# Patient Record
Sex: MALE | Race: BLACK OR AFRICAN AMERICAN | NOT HISPANIC OR LATINO | Employment: OTHER | URBAN - METROPOLITAN AREA
[De-identification: names, ages, dates, MRNs, and addresses within clinical notes are randomized per-mention and may not be internally consistent; named-entity substitution may affect disease eponyms.]

---

## 2023-02-11 PROBLEM — N40.1 BPH WITH OBSTRUCTION/LOWER URINARY TRACT SYMPTOMS: Status: ACTIVE | Noted: 2023-02-11

## 2023-02-11 PROBLEM — I10 BENIGN ESSENTIAL HYPERTENSION: Status: ACTIVE | Noted: 2023-02-11

## 2023-02-11 PROBLEM — R31.9 HEMATURIA: Status: ACTIVE | Noted: 2023-02-11

## 2023-02-11 PROBLEM — N40.0 ENLARGED PROSTATE WITHOUT LOWER URINARY TRACT SYMPTOMS (LUTS): Status: ACTIVE | Noted: 2023-02-11

## 2023-02-11 PROBLEM — M79.89 CALF SWELLING: Status: ACTIVE | Noted: 2023-02-11

## 2023-02-11 PROBLEM — N13.8 BPH WITH OBSTRUCTION/LOWER URINARY TRACT SYMPTOMS: Status: ACTIVE | Noted: 2023-02-11

## 2023-02-11 PROBLEM — I87.2 VENOUS INSUFFICIENCY: Status: ACTIVE | Noted: 2023-02-11

## 2023-02-11 PROBLEM — M54.9 BACK PAIN: Status: ACTIVE | Noted: 2023-02-11

## 2023-02-11 PROBLEM — R60.0 PEDAL EDEMA: Status: ACTIVE | Noted: 2023-02-11

## 2023-02-11 PROBLEM — R35.0 FREQUENT URINATION: Status: ACTIVE | Noted: 2023-02-11

## 2023-02-11 PROBLEM — N47.1 PHIMOSIS: Status: ACTIVE | Noted: 2023-02-11

## 2023-02-11 PROBLEM — M25.511 RIGHT SHOULDER PAIN: Status: ACTIVE | Noted: 2023-02-11

## 2023-02-11 PROBLEM — M12.819 ROTATOR CUFF ARTHROPATHY: Status: ACTIVE | Noted: 2023-02-11

## 2023-02-11 PROBLEM — M88.9 PAGET'S BONE DISEASE: Status: ACTIVE | Noted: 2023-02-11

## 2023-02-11 PROBLEM — M43.10 ACQUIRED SPONDYLOLISTHESIS: Status: ACTIVE | Noted: 2023-02-11

## 2023-02-11 PROBLEM — R26.81 GAIT INSTABILITY: Status: ACTIVE | Noted: 2023-02-11

## 2023-02-11 PROBLEM — R33.9 URINARY RETENTION: Status: ACTIVE | Noted: 2023-02-11

## 2023-02-11 PROBLEM — E78.5 HYPERLIPEMIA: Status: ACTIVE | Noted: 2023-02-11

## 2023-02-11 PROBLEM — G20.A1 PARKINSON'S DISEASE (MULTI): Status: ACTIVE | Noted: 2023-02-11

## 2023-02-11 PROBLEM — N39.0 URINARY TRACT INFECTION: Status: ACTIVE | Noted: 2023-02-11

## 2023-02-11 RX ORDER — BRIMONIDINE TARTRATE AND TIMOLOL MALEATE 2; 5 MG/ML; MG/ML
1 SOLUTION OPHTHALMIC 2 TIMES DAILY
COMMUNITY
Start: 2020-01-07 | End: 2023-09-01 | Stop reason: ALTCHOICE

## 2023-02-11 RX ORDER — BETAMETHASONE DIPROPIONATE 0.5 MG/G
1 CREAM TOPICAL 2 TIMES DAILY
COMMUNITY
Start: 2021-04-12 | End: 2023-09-01 | Stop reason: ALTCHOICE

## 2023-02-11 RX ORDER — SULFAMETHOXAZOLE AND TRIMETHOPRIM 800; 160 MG/1; MG/1
1 TABLET ORAL 2 TIMES DAILY
COMMUNITY
Start: 2022-05-05 | End: 2023-09-01 | Stop reason: ALTCHOICE

## 2023-02-11 RX ORDER — DORZOLAMIDE HYDROCHLORIDE AND TIMOLOL MALEATE 20; 5 MG/ML; MG/ML
SOLUTION/ DROPS OPHTHALMIC
COMMUNITY
Start: 2021-03-02

## 2023-02-11 RX ORDER — LATANOPROST 50 UG/ML
1 SOLUTION/ DROPS OPHTHALMIC NIGHTLY
COMMUNITY
Start: 2019-10-24

## 2023-02-11 RX ORDER — CLOBETASOL PROPIONATE 0.5 MG/G
1 CREAM TOPICAL 2 TIMES DAILY
COMMUNITY
Start: 2022-09-20 | End: 2023-09-01 | Stop reason: ALTCHOICE

## 2023-02-11 RX ORDER — SIMVASTATIN 40 MG/1
40 TABLET, FILM COATED ORAL NIGHTLY
COMMUNITY
Start: 2014-06-11 | End: 2023-06-27 | Stop reason: SDUPTHER

## 2023-02-11 RX ORDER — CARBIDOPA AND LEVODOPA 25; 100 MG/1; MG/1
TABLET ORAL
COMMUNITY
Start: 2022-09-20 | End: 2024-02-22 | Stop reason: SDUPTHER

## 2023-02-11 RX ORDER — LOSARTAN POTASSIUM 50 MG/1
50 TABLET ORAL DAILY
COMMUNITY
Start: 2022-02-18 | End: 2023-09-06 | Stop reason: SDUPTHER

## 2023-02-11 RX ORDER — TAMSULOSIN HYDROCHLORIDE 0.4 MG/1
1 CAPSULE ORAL NIGHTLY
COMMUNITY
Start: 2014-06-11 | End: 2023-09-01 | Stop reason: ALTCHOICE

## 2023-02-24 LAB
ALANINE AMINOTRANSFERASE (SGPT) (U/L) IN SER/PLAS: 8 U/L (ref 10–52)
ALBUMIN (G/DL) IN SER/PLAS: 4.2 G/DL (ref 3.4–5)
ALKALINE PHOSPHATASE (U/L) IN SER/PLAS: 61 U/L (ref 33–136)
ANION GAP IN SER/PLAS: 9 MMOL/L (ref 10–20)
ASPARTATE AMINOTRANSFERASE (SGOT) (U/L) IN SER/PLAS: 14 U/L (ref 9–39)
BILIRUBIN TOTAL (MG/DL) IN SER/PLAS: 1 MG/DL (ref 0–1.2)
CALCIUM (MG/DL) IN SER/PLAS: 9.4 MG/DL (ref 8.6–10.3)
CARBON DIOXIDE, TOTAL (MMOL/L) IN SER/PLAS: 32 MMOL/L (ref 21–32)
CHLORIDE (MMOL/L) IN SER/PLAS: 102 MMOL/L (ref 98–107)
CHOLESTEROL (MG/DL) IN SER/PLAS: 133 MG/DL (ref 0–199)
CHOLESTEROL IN HDL (MG/DL) IN SER/PLAS: 53.3 MG/DL
CHOLESTEROL/HDL RATIO: 2.5
CREATININE (MG/DL) IN SER/PLAS: 0.93 MG/DL (ref 0.5–1.3)
ERYTHROCYTE DISTRIBUTION WIDTH (RATIO) BY AUTOMATED COUNT: 12.9 % (ref 11.5–14.5)
ERYTHROCYTE MEAN CORPUSCULAR HEMOGLOBIN CONCENTRATION (G/DL) BY AUTOMATED: 33.3 G/DL (ref 32–36)
ERYTHROCYTE MEAN CORPUSCULAR VOLUME (FL) BY AUTOMATED COUNT: 94 FL (ref 80–100)
ERYTHROCYTES (10*6/UL) IN BLOOD BY AUTOMATED COUNT: 4.49 X10E12/L (ref 4.5–5.9)
GFR MALE: 83 ML/MIN/1.73M2
GLUCOSE (MG/DL) IN SER/PLAS: 89 MG/DL (ref 74–99)
HEMATOCRIT (%) IN BLOOD BY AUTOMATED COUNT: 42.1 % (ref 41–52)
HEMOGLOBIN (G/DL) IN BLOOD: 14 G/DL (ref 13.5–17.5)
LDL: 63 MG/DL (ref 0–99)
LEUKOCYTES (10*3/UL) IN BLOOD BY AUTOMATED COUNT: 6.2 X10E9/L (ref 4.4–11.3)
PLATELETS (10*3/UL) IN BLOOD AUTOMATED COUNT: 179 X10E9/L (ref 150–450)
POTASSIUM (MMOL/L) IN SER/PLAS: 3.8 MMOL/L (ref 3.5–5.3)
PROSTATE SPECIFIC AG (NG/ML) IN SER/PLAS: 0.69 NG/ML (ref 0–4)
PROTEIN TOTAL: 7.1 G/DL (ref 6.4–8.2)
SODIUM (MMOL/L) IN SER/PLAS: 139 MMOL/L (ref 136–145)
THYROTROPIN (MIU/L) IN SER/PLAS BY DETECTION LIMIT <= 0.05 MIU/L: 3.25 MIU/L (ref 0.44–3.98)
TRIGLYCERIDE (MG/DL) IN SER/PLAS: 83 MG/DL (ref 0–149)
UREA NITROGEN (MG/DL) IN SER/PLAS: 14 MG/DL (ref 6–23)
VLDL: 17 MG/DL (ref 0–40)

## 2023-03-06 ENCOUNTER — OFFICE VISIT (OUTPATIENT)
Dept: PRIMARY CARE | Facility: CLINIC | Age: 78
End: 2023-03-06
Payer: MEDICARE

## 2023-03-06 VITALS
WEIGHT: 172.4 LBS | RESPIRATION RATE: 16 BRPM | BODY MASS INDEX: 27 KG/M2 | TEMPERATURE: 98.1 F | HEART RATE: 72 BPM | SYSTOLIC BLOOD PRESSURE: 130 MMHG | DIASTOLIC BLOOD PRESSURE: 80 MMHG

## 2023-03-06 DIAGNOSIS — R60.0 PEDAL EDEMA: ICD-10-CM

## 2023-03-06 DIAGNOSIS — M79.672 PAIN IN BOTH FEET: ICD-10-CM

## 2023-03-06 DIAGNOSIS — M88.9 PAGET'S BONE DISEASE: ICD-10-CM

## 2023-03-06 DIAGNOSIS — I10 BENIGN ESSENTIAL HYPERTENSION: ICD-10-CM

## 2023-03-06 DIAGNOSIS — N13.8 BPH WITH OBSTRUCTION/LOWER URINARY TRACT SYMPTOMS: ICD-10-CM

## 2023-03-06 DIAGNOSIS — G20.A1 PARKINSON'S DISEASE (MULTI): Primary | ICD-10-CM

## 2023-03-06 DIAGNOSIS — G89.29 CHRONIC MIDLINE LOW BACK PAIN, UNSPECIFIED WHETHER SCIATICA PRESENT: ICD-10-CM

## 2023-03-06 DIAGNOSIS — E78.49 OTHER HYPERLIPIDEMIA: ICD-10-CM

## 2023-03-06 DIAGNOSIS — M79.671 PAIN IN BOTH FEET: ICD-10-CM

## 2023-03-06 DIAGNOSIS — M54.50 CHRONIC MIDLINE LOW BACK PAIN, UNSPECIFIED WHETHER SCIATICA PRESENT: ICD-10-CM

## 2023-03-06 DIAGNOSIS — N40.1 BPH WITH OBSTRUCTION/LOWER URINARY TRACT SYMPTOMS: ICD-10-CM

## 2023-03-06 PROCEDURE — 1160F RVW MEDS BY RX/DR IN RCRD: CPT | Performed by: INTERNAL MEDICINE

## 2023-03-06 PROCEDURE — 3075F SYST BP GE 130 - 139MM HG: CPT | Performed by: INTERNAL MEDICINE

## 2023-03-06 PROCEDURE — 3079F DIAST BP 80-89 MM HG: CPT | Performed by: INTERNAL MEDICINE

## 2023-03-06 PROCEDURE — 99214 OFFICE O/P EST MOD 30 MIN: CPT | Performed by: INTERNAL MEDICINE

## 2023-03-06 PROCEDURE — 1159F MED LIST DOCD IN RCRD: CPT | Performed by: INTERNAL MEDICINE

## 2023-03-06 RX ORDER — FUROSEMIDE 40 MG/1
40 TABLET ORAL DAILY
Qty: 30 TABLET | Refills: 11 | Status: SHIPPED | OUTPATIENT
Start: 2023-03-06 | End: 2023-12-04

## 2023-03-06 ASSESSMENT — ENCOUNTER SYMPTOMS
ALLERGIC/IMMUNOLOGIC NEGATIVE: 1
WEAKNESS: 1
PSYCHIATRIC NEGATIVE: 1
BACK PAIN: 1
HEMATOLOGIC/LYMPHATIC NEGATIVE: 1
GASTROINTESTINAL NEGATIVE: 1

## 2023-03-06 NOTE — PROGRESS NOTES
Subjective   Patient ID: Yaniv Bearden is a 77 y.o. male who presents for No chief complaint on file..    Patient with a with history of hypertension, hyperlipidemia, Lumbar Spondylosis, Parkinson's disease BPH with LUTS s/p HoLEP, Pedal edema    Reviewed blood work and echo  Swelling is the same , lasix has not helped  Echo was OK    C/o back pain         Review of Systems   Cardiovascular:  Positive for leg swelling.   Gastrointestinal: Negative.    Genitourinary: Negative.    Musculoskeletal:  Positive for back pain and gait problem.   Allergic/Immunologic: Negative.    Neurological:  Positive for weakness.   Hematological: Negative.    Psychiatric/Behavioral: Negative.     All other systems reviewed and are negative.      Objective   Temp 36.7 °C (98.1 °F) (Oral)   Wt 78.2 kg (172 lb 6.4 oz)   BMI 27.00 kg/m²     Physical Exam  Constitutional:       Appearance: Normal appearance.   HENT:      Head: Normocephalic and atraumatic.      Mouth/Throat:      Mouth: Mucous membranes are moist.   Eyes:      Pupils: Pupils are equal, round, and reactive to light.   Cardiovascular:      Rate and Rhythm: Normal rate and regular rhythm.      Pulses: Normal pulses.   Pulmonary:      Effort: Pulmonary effort is normal.      Breath sounds: Normal breath sounds.   Abdominal:      General: Abdomen is flat. Bowel sounds are normal.      Palpations: Abdomen is soft.   Musculoskeletal:         General: Tenderness present. Normal range of motion.      Cervical back: Tenderness present.   Skin:     General: Skin is warm and dry.   Neurological:      Mental Status: He is alert and oriented to person, place, and time.      Motor: Weakness present.      Gait: Gait abnormal.         Assessment/Plan     Patient with a with history of hypertension, hyperlipidemia, Lumbar Spondylosis, Parkinson's disease BPH with LUTS s/p HoLEP, Pedal edema    # HTN  Stable    # Pedal edema  Increase lasix    # Back pain  Xray L spine

## 2023-06-27 DIAGNOSIS — E78.5 HYPERLIPIDEMIA, UNSPECIFIED HYPERLIPIDEMIA TYPE: ICD-10-CM

## 2023-06-27 RX ORDER — SIMVASTATIN 40 MG/1
40 TABLET, FILM COATED ORAL NIGHTLY
Qty: 60 TABLET | Refills: 1 | Status: SHIPPED | OUTPATIENT
Start: 2023-06-27 | End: 2023-12-21 | Stop reason: SDUPTHER

## 2023-06-27 NOTE — TELEPHONE ENCOUNTER
Rx Refill Request Telephone Encounter    Name:  Yaniv Bearden  :  [id number]  Medication Name:  Simvastatin 40mg  Specific Pharmacy location:  [facility]

## 2023-09-01 ENCOUNTER — OFFICE VISIT (OUTPATIENT)
Dept: PRIMARY CARE | Facility: CLINIC | Age: 78
End: 2023-09-01
Payer: MEDICARE

## 2023-09-01 VITALS
BODY MASS INDEX: 28.1 KG/M2 | DIASTOLIC BLOOD PRESSURE: 70 MMHG | HEART RATE: 66 BPM | TEMPERATURE: 98.1 F | RESPIRATION RATE: 16 BRPM | SYSTOLIC BLOOD PRESSURE: 130 MMHG | WEIGHT: 179.4 LBS

## 2023-09-01 DIAGNOSIS — E78.49 OTHER HYPERLIPIDEMIA: ICD-10-CM

## 2023-09-01 DIAGNOSIS — R60.0 PEDAL EDEMA: ICD-10-CM

## 2023-09-01 DIAGNOSIS — I10 BENIGN ESSENTIAL HYPERTENSION: Primary | ICD-10-CM

## 2023-09-01 DIAGNOSIS — N52.9 ERECTILE DYSFUNCTION, UNSPECIFIED ERECTILE DYSFUNCTION TYPE: ICD-10-CM

## 2023-09-01 DIAGNOSIS — Z23 NEED FOR SHINGLES VACCINE: ICD-10-CM

## 2023-09-01 DIAGNOSIS — G20.A1 PARKINSON'S DISEASE (MULTI): ICD-10-CM

## 2023-09-01 DIAGNOSIS — N13.8 BPH WITH OBSTRUCTION/LOWER URINARY TRACT SYMPTOMS: ICD-10-CM

## 2023-09-01 DIAGNOSIS — N40.1 BPH WITH OBSTRUCTION/LOWER URINARY TRACT SYMPTOMS: ICD-10-CM

## 2023-09-01 PROCEDURE — 1036F TOBACCO NON-USER: CPT | Performed by: INTERNAL MEDICINE

## 2023-09-01 PROCEDURE — 3075F SYST BP GE 130 - 139MM HG: CPT | Performed by: INTERNAL MEDICINE

## 2023-09-01 PROCEDURE — 1160F RVW MEDS BY RX/DR IN RCRD: CPT | Performed by: INTERNAL MEDICINE

## 2023-09-01 PROCEDURE — 1126F AMNT PAIN NOTED NONE PRSNT: CPT | Performed by: INTERNAL MEDICINE

## 2023-09-01 PROCEDURE — 3078F DIAST BP <80 MM HG: CPT | Performed by: INTERNAL MEDICINE

## 2023-09-01 PROCEDURE — 1159F MED LIST DOCD IN RCRD: CPT | Performed by: INTERNAL MEDICINE

## 2023-09-01 PROCEDURE — 99497 ADVNCD CARE PLAN 30 MIN: CPT | Performed by: INTERNAL MEDICINE

## 2023-09-01 PROCEDURE — G0439 PPPS, SUBSEQ VISIT: HCPCS | Performed by: INTERNAL MEDICINE

## 2023-09-01 PROCEDURE — 99214 OFFICE O/P EST MOD 30 MIN: CPT | Performed by: INTERNAL MEDICINE

## 2023-09-01 RX ORDER — ZOSTER VACCINE RECOMBINANT, ADJUVANTED 50 MCG/0.5
0.5 KIT INTRAMUSCULAR ONCE
Qty: 0.5 ML | Refills: 0 | Status: SHIPPED | OUTPATIENT
Start: 2023-09-01 | End: 2023-09-01

## 2023-09-01 RX ORDER — SILDENAFIL 100 MG/1
100 TABLET, FILM COATED ORAL DAILY PRN
Qty: 12 TABLET | Refills: 3 | Status: SHIPPED | OUTPATIENT
Start: 2023-09-01 | End: 2023-11-10 | Stop reason: WASHOUT

## 2023-09-01 ASSESSMENT — ENCOUNTER SYMPTOMS
OCCASIONAL FEELINGS OF UNSTEADINESS: 0
DEPRESSION: 0
LOSS OF SENSATION IN FEET: 0

## 2023-09-01 NOTE — PROGRESS NOTES
"Yaniv Bearden is a 77 y.o. male here for a Medicare Wellness Exam.    No chief complaint on file.       Patient with a with history of hypertension, hyperlipidemia, Lumbar Spondylosis, Parkinson's disease, BPH with LUTS s/p HoLEP, Pedal edema, Padget's disease    Feels fine  No chest pain/  SOB/ dizziness  BM OK  Energy level ok  Appetite OK    Nocturia x 1         Medicare Wellness Exam    The patent is being seen for a follow up annual wellness visit  Past Medical, Surgical and family History: Reviewed and updated in chart  Interval History: Patient has not been hospitalized previously  Medications and Supplements: Review of all medications by a prescribing practitioner or clinical pharmacist (such as prescriptions, OTC, Herbal therapies and supplements) documented in the medical record.    Patient Self-Assessment of health: Fair  Tobacco Use: No  Alcohol Use: No  Illicit drug use: No  Patient using opioids: No    Current Diet: in general, a \"healthy\" diet    Adequate fluid intake: Yes  Caffeine intake: Yes  Exercise frequency: moderately active    Depression/Suicide screening: PHQ2/ PHQ9 (see screenings tab)    Hearing impairment: No  Uses hearing aids N/A  Cognitive impairment Observation: No   Patient or family reported cognitive impairment: No    Bathing: independent  Dressing: independent  Walking: independent  Taking Medications: independent  Feeding: independent  Personal Hygiene: independent  Managing Finances: independent  Shopping: with partial assistance  Housework/Basic Home Maintenance: dependant  Handling transportation: independent  Preparing meals: with partial assistance    Bowels: continent  Bladder: continent    Falls Risk: has notfallen in last 6 months.   Their fall has not resulted in an injury.   Fall risk Factors: Fall Risk Factors: Mobility Impairment and    severe   Care Plan Risk: Care Plan: High Risk: Regular physical activity such as walking, water aerobics or cristal chi to improve " strength, balance, coordination and flexibility. Wear appropriate, sensible shoe wear. Remove fall hazards at home such as loose rugs, obstacles, use non-slip surface in bath or shower. Keep living space well lit. Use assistive devices such as cane or walker if recommended and at home use handrails on stairs, grab bars for shower or tub, raised toilet seat and seat in the shower or tub.       Home Safety Risk Factors: Home Safety Risk Factors: None  Advanced Directives:  Living will: No POA: Yes    Patient's End of Life Decisions: Provider agree to follow.      No past medical history on file.     Review of Systems     Constitutional: no fever, no chills, not feeling poorly, not feeling tired and no recent weight gain, no recent weight loss.   ENT: no earache, no hearing loss, no nosebleeds, no nasal discharge, no sore throat and no hoarseness.   Cardiovascular: the heart rate was not slow, the heart rate was not fast, no chest pain, no palpitations, no intermittent leg claudication and no lower extremity edema.   Respiratory: no cough, wheezing or shortness of breath at rest or exertion  Gastrointestinal: no abdominal pain, no constipation, no melena, no nausea, no diarrhea, no vomiting and no blood in stools.   Musculoskeletal: no arthralgias, no myalgias, Positive back pain, no joint swelling,  joint stiffness, no limb pain and no limb swelling.   Integumentary: no skin rashes, no skin lesions, no itching, no skin wound and no dry skin.   Neurological: no headache, no confusion, no numbness, no dizziness, no tingling and no fainting., Tremors/ stiffness   All other systems have been reviewed and are negative for complaint.        9/20/2022    11:15 AM 9/20/2022     2:59 PM 11/15/2022    11:40 AM 2/3/2023    12:09 PM 2/3/2023    12:42 PM 3/6/2023    12:40 PM 9/1/2023     1:07 PM   Vitals   Systolic 135 155 144  130 130 130   Diastolic 82 76 81  80 80 70   Heart Rate 60 68 64  64 72 66   Temp 35.9 °C (96.6 °F)   "36.4 °C (97.6 °F)   36.7 °C (98.1 °F) 36.7 °C (98.1 °F)   Resp  18   16 16 16   Height (in) 1.702 m (5' 7\")  1.702 m (5' 7\") 1.702 m (5' 7\")      Weight (lb) 166  166.13 165  172.4 179.4   BMI 26 kg/m2  26.02 kg/m2 25.84 kg/m2  27 kg/m2 28.1 kg/m2   BSA (m2) 1.89 m2  1.89 m2 1.88 m2  1.92 m2 1.96 m2   Visit Report      Report Report       Physical Exam     Constitutional   General appearance: Alert and in no acute distress.     Pulmonary   Respiratory assessment: No respiratory distress, normal respiratory rhythm and effort.    Auscultation of Lungs: Clear bilateral breath sounds.   Cardiovascular   Auscultation of heart: Apical pulse normal, heart rate and rhythm normal, normal S1 and S2, no murmurs and no pericardial rub.    Exam for edema: + peripheral edema.   Abdomen   Abdominal Exam: No bruits, normal bowel sounds, soft, non-tender, no abdominal mass palpated.    Liver and Spleen exam: No hepato-splenomegaly.   Musculoskeletal   Examination of gait: abnormal gait  Inspection of digits and nails: No clubbing or cyanosis of the fingernails.    Inspection/palpation of joints, bones and muscles: No joint swelling. Normal movement of all extremities.   Skin   Skin inspection: Normal skin color and pigmentation, normal skin turgor and no visible rash.   Neurologic   Cranial nerves: Nerves 2-12 were intact, pin rolling movement, shuffling gait        No results found for requested labs within last 365 days.       Assessment/Plan            Patient with a with history of hypertension, hyperlipidemia, Lumbar Spondylosis, Parkinson's disease BPH with LUTS s/p HoLEP, Pedal edema , Padget's disease    # HTN/ Pedal edema  Stable  Continue current medications    # Parkinson's Disease  Getting more symptomatic  Recommend neurology appointment    # HLD  Stable  Continue current medications  Watch salt, avoid excessive caffeine  Avoid processed meats/ sugars/juices Instead eat fresh fruit  Add walnuts and almonds to your " diet  exercise 6 days a week for 30 minutes    # ED  Rx Viagra        Discussed getting Health Care Power of  and Living Will Documents and their importance. These are legal documents obtained through a . Total Time spent in this discussion was less than 30 minutes    Recommend  Covid  Flu  Shingrix

## 2023-09-06 DIAGNOSIS — I10 BENIGN ESSENTIAL HYPERTENSION: ICD-10-CM

## 2023-09-06 RX ORDER — LOSARTAN POTASSIUM 50 MG/1
50 TABLET ORAL DAILY
Qty: 90 TABLET | Refills: 0 | Status: SHIPPED | OUTPATIENT
Start: 2023-09-06 | End: 2023-12-21 | Stop reason: SDUPTHER

## 2023-10-07 ENCOUNTER — HOSPITAL ENCOUNTER (EMERGENCY)
Facility: HOSPITAL | Age: 78
Discharge: HOME | End: 2023-10-07
Attending: EMERGENCY MEDICINE
Payer: MEDICARE

## 2023-10-07 VITALS
OXYGEN SATURATION: 100 % | BODY MASS INDEX: 26.64 KG/M2 | WEIGHT: 169.75 LBS | HEIGHT: 67 IN | HEART RATE: 63 BPM | RESPIRATION RATE: 16 BRPM | TEMPERATURE: 98.1 F | SYSTOLIC BLOOD PRESSURE: 170 MMHG | DIASTOLIC BLOOD PRESSURE: 78 MMHG

## 2023-10-07 DIAGNOSIS — H10.503 BLEPHAROCONJUNCTIVITIS OF BOTH EYES, UNSPECIFIED BLEPHAROCONJUNCTIVITIS TYPE: Primary | ICD-10-CM

## 2023-10-07 PROCEDURE — 99283 EMERGENCY DEPT VISIT LOW MDM: CPT | Performed by: EMERGENCY MEDICINE

## 2023-10-07 PROCEDURE — 2500000001 HC RX 250 WO HCPCS SELF ADMINISTERED DRUGS (ALT 637 FOR MEDICARE OP): Performed by: PHYSICIAN ASSISTANT

## 2023-10-07 RX ORDER — ERYTHROMYCIN 5 MG/G
OINTMENT OPHTHALMIC EVERY 6 HOURS
Qty: 3.5 G | Refills: 0 | Status: SHIPPED | OUTPATIENT
Start: 2023-10-07 | End: 2023-10-09

## 2023-10-07 RX ORDER — TETRACAINE HYDROCHLORIDE 5 MG/ML
1 SOLUTION OPHTHALMIC ONCE
Status: COMPLETED | OUTPATIENT
Start: 2023-10-07 | End: 2023-10-07

## 2023-10-07 RX ORDER — TETRACAINE HYDROCHLORIDE 5 MG/ML
1 SOLUTION OPHTHALMIC ONCE
Status: DISCONTINUED | OUTPATIENT
Start: 2023-10-07 | End: 2023-10-07

## 2023-10-07 RX ADMIN — FLUORESCEIN SODIUM 1 STRIP: 1 STRIP OPHTHALMIC at 13:30

## 2023-10-07 RX ADMIN — TETRACAINE HYDROCHLORIDE 1 DROP: 5 SOLUTION OPHTHALMIC at 13:30

## 2023-10-07 ASSESSMENT — COLUMBIA-SUICIDE SEVERITY RATING SCALE - C-SSRS
6. HAVE YOU EVER DONE ANYTHING, STARTED TO DO ANYTHING, OR PREPARED TO DO ANYTHING TO END YOUR LIFE?: NO
2. HAVE YOU ACTUALLY HAD ANY THOUGHTS OF KILLING YOURSELF?: NO
1. IN THE PAST MONTH, HAVE YOU WISHED YOU WERE DEAD OR WISHED YOU COULD GO TO SLEEP AND NOT WAKE UP?: NO

## 2023-10-07 ASSESSMENT — PAIN DESCRIPTION - ORIENTATION: ORIENTATION: LEFT;RIGHT

## 2023-10-07 ASSESSMENT — VISUAL ACUITY
OU: 20/30
OD: 20/70
OS: 20/100

## 2023-10-07 ASSESSMENT — PAIN SCALES - GENERAL: PAINLEVEL_OUTOF10: 5 - MODERATE PAIN

## 2023-10-07 ASSESSMENT — PAIN DESCRIPTION - LOCATION: LOCATION: EYE

## 2023-10-07 ASSESSMENT — PAIN - FUNCTIONAL ASSESSMENT: PAIN_FUNCTIONAL_ASSESSMENT: 0-10

## 2023-10-07 NOTE — ED PROVIDER NOTES
"78-year-old male with glaucoma presents with bilateral eye drainage \"tearing\" with itching and burning.  He saw his ophthalmologist 2 days ago.  No new medications.  Reports his IOP was 16/17.  He started taking Opcon-A on his own which does give him some partial temporary relief of symptoms.  No change in vision.  No purulent drainage.  No foreign body or injury.  No headache.  No nausea vomiting.  No fever.  No URI symptoms.  He does have seasonal allergies.    PMHx: EMR/HPI reviewed for medical hx , medications, allergies    REVIEW OF SYSTEMS:  Constitutional: No recent illness, No fever, chills, sweats.   Eyes: Refer to history of present illness  ENT: No sore throat, sinus pain, rhinorrhea, nosebleeds, gross hearing loss, ear pain, tinnitus, vertigo  Neurological: No headache. No acute visual changes. No difficulty swallowing or speaking. No hemiparesis or paresthesias.  Integumentary: No rashes.   Review of systems is otherwise negative unless stated above or in history of present illness.    FOCUSED EXAM:  Eye: Bilateral  Visual acuity: 20/70 right eye 20/100 left eye 20/30 both eyes see nurses notes.  Periorbital: No rash erythema or increased warmth.  No tenderness.  Eyelids: Mild lid inflammation/edema left greater than right.  Sclera: Anicteric intact.  Conjunctiva: Bilateral injection mild.  Cornea: Clear intact.  Fluorescein stain negative no dendritic lesions abrasions foreign body  Pupils: Round reactive no photophobia.  Intact without pain or diplopia.  Ocular movement: Intact without pain or diplopia.  Fluorescein: No dye uptake.  Anatoly-Pen: IOP 15 right eye 19 left eye    General: Vitals noted. No distress. Afebrile  Constitutional: Well appearing, well nourished, awake, alert, oriented to person, place, time/situation and in no apparent distress.  Skin:  Intact, warm and dry skin.   HEENT: Normocephalic, atraumatic, No facial asymmetry. Hearing grossly intact. Normal phonation. Swallows and speaks " without difficulty,  Neck: Supple, full range of motion, no asymmetry or obvious swelling, no stridor.  Cardiovascular:  Blood pressure noted. Normal heart rate  Respiratory: No respiratory distress. Easy unlabored respirations. Speaks easily and clearly in full sentences  Musculoskeletal: Full range of motion of all extremities symmetrical strength in all major muscle groups. No obvious deformities.  Neurological: Normal mental status, alert and oriented x3, coherent and appropriate, no motor deficits. Ambulatory  Psychiatric: Appropriate mood and affect. Judgement and insight appropriate    MDM:  Evaluation consistent with eyelid inflammation conjunctivitis.  Has history of glaucoma with stable IOP.  Recently evaluated by his ophthalmologist 2 days ago.  The patient has also been seen and evaluated by the ER physician.  Recommends erythromycin ophthalmic ointment.  Continue Opcon-A allergic eyedrops.  Discharge outpatient follow-up with ophthalmology.  Stable for discharge outpatient management.    Disclaimer: This note was dictated by speech recognition. An attempt at proof reading was made to minimize errors. Errors in transcription may be present.  Please call if questions.        Patient seen with advanced practitioner.  Concur with history physical exam assessment and plan.  Present for subsidence of portions of the encounter.  Patient with bilateral conjunctival injection and drainage and eyelid irritation.  Has already followed up with ophthalmology.  Presumptive allergic phenomenon, but he does complain of some crustiness at times.  Exam does show evidence of blepharitis and potentially mild conjunctivitis.  Patient acuity stable.  Intraocular pressure is noted as above.  No immediate vision threatening issues occurring.  Recommend addition of ophthalmologic ointment in terms of erythromycin, continue allergic medications, and follow-up with ophthalmology.  Discharged in stable condition.     Juan R ROLDAN  MD Quentin MPH  10/07/23 163       Juan R Saavedra MD MPH  10/07/23 [id number]

## 2023-11-09 NOTE — PROGRESS NOTES
HPI  78-year-old patient here for follow-up after holmium laser enucleation of the prostate     Patient was in urinary retention with a prostate volume of 138 cmÂ³. He underwent an uneventful holep on May 28, 2022. He developed significant hematuria in the postoperative area and was taken back for clot evacuation and fulguration.  He passed a voiding trial on May 23, 2022. Pathology showed 47.6 g of benign prostatic tissue     Last seen by Dr. brower 6/10/22. good stream, no leakage, PVR 0ml     9/20/22 - comes in for follow up. Voiding well. Has some baseline urgency but no incontinence. Having issues retracting his foreskin.     11/15/22- Foreskin is manageable, has been using the cream. Voiding well. Urgency is minimal. Would not like anything done at this point.         11/10/23 - seen today for 1yr fuv with PVR. No urinary issues. PVR 1cc. Foreskin problem resolved. Notes longstanding ED, naive to meds. Has PD. No nitrates    Lab Results   Component Value Date    PSA 0.69 02/24/2023    PSA 6.02 (H) 02/17/2022       Current Medications:  Current Outpatient Medications   Medication Sig Dispense Refill    carbidopa-levodopa (Sinemet)  mg tablet Take by mouth. Take half a tablet 3 times daily at 10 AM, 2 PM, 6 PM for 1 week then go up to one full tablet TID after that.      dorzolamide-timoloL (Cosopt) 22.3-6.8 mg/mL ophthalmic solution Dorzolamide HCl-Timolol Mal 22.3-6.8 MG/ML Ophthalmic Solution   Quantity: 10  Refills: 0        Start : 2-Mar-2021   Active      furosemide (Lasix) 40 mg tablet Take 1 tablet (40 mg) by mouth once daily. 30 tablet 11    latanoprost (Xalatan) 0.005 % ophthalmic solution Administer 1 drop into both eyes at bedtime.      losartan (Cozaar) 50 mg tablet Take 1 tablet (50 mg) by mouth once daily. 90 tablet 0    sildenafil (Viagra) 100 mg tablet Take 1 tablet (100 mg) by mouth once daily as needed for erectile dysfunction. 12 tablet 3    simvastatin (Zocor) 40 mg tablet Take 1 tablet  (40 mg) by mouth once daily at bedtime. 60 tablet 1     No current facility-administered medications for this visit.        Active Problems:  Yaniv Bearden is a 78 y.o. male with the following Problems and Medications.  Patient Active Problem List   Diagnosis    Acquired spondylolisthesis    Back pain    Benign essential hypertension    BPH with obstruction/lower urinary tract symptoms    Calf swelling    Enlarged prostate without lower urinary tract symptoms (luts)    Frequent urination    Gait instability    Hematuria    Paget's bone disease    Venous insufficiency    Urinary tract infection    Urinary retention    Rotator cuff arthropathy    Right shoulder pain    Phimosis    Parkinson's disease    Hyperlipemia    Pedal edema     Current Outpatient Medications   Medication Sig Dispense Refill    carbidopa-levodopa (Sinemet)  mg tablet Take by mouth. Take half a tablet 3 times daily at 10 AM, 2 PM, 6 PM for 1 week then go up to one full tablet TID after that.      dorzolamide-timoloL (Cosopt) 22.3-6.8 mg/mL ophthalmic solution Dorzolamide HCl-Timolol Mal 22.3-6.8 MG/ML Ophthalmic Solution   Quantity: 10  Refills: 0        Start : 2-Mar-2021   Active      furosemide (Lasix) 40 mg tablet Take 1 tablet (40 mg) by mouth once daily. 30 tablet 11    latanoprost (Xalatan) 0.005 % ophthalmic solution Administer 1 drop into both eyes at bedtime.      losartan (Cozaar) 50 mg tablet Take 1 tablet (50 mg) by mouth once daily. 90 tablet 0    sildenafil (Viagra) 100 mg tablet Take 1 tablet (100 mg) by mouth once daily as needed for erectile dysfunction. 12 tablet 3    simvastatin (Zocor) 40 mg tablet Take 1 tablet (40 mg) by mouth once daily at bedtime. 60 tablet 1     No current facility-administered medications for this visit.       PMH:  No past medical history on file.    PSH:  Past Surgical History:   Procedure Laterality Date    PROSTATE SURGERY         FMH:  Family History   Problem Relation Name Age of Onset     No Known Problems Mother      No Known Problems Father         SHx:  Social History     Tobacco Use    Smoking status: Never    Smokeless tobacco: Never   Vaping Use    Vaping Use: Never used   Substance Use Topics    Alcohol use: Never    Drug use: Never       Allergies:  No Known Allergies      Assesment/Plan  Doing well from urinary perspective, no complaints.  Foreskin issue has resolved.  Likely multifactorial erectile dysfunction in the setting of advanced age, Parkinson's disease.  He has no contraindications to trying PDE 5 inhibitors.  We will start on sildenafil 100 mg daily as needed.  Discussed side effects of contraindication nitrates.  Continue good Rx card.  Follow-up in 3 months over telehealth for symptom check.  If no better, we discussed injections versus implant versus observation.      Scribe Attestation  By signing my name below, I, Evita Gonzalez , Lb   attest that this documentation has been prepared under the direction and in the presence of Ramy Castle MD.

## 2023-11-10 ENCOUNTER — OFFICE VISIT (OUTPATIENT)
Dept: UROLOGY | Facility: HOSPITAL | Age: 78
End: 2023-11-10
Payer: MEDICARE

## 2023-11-10 DIAGNOSIS — N40.1 BENIGN LOCALIZED PROSTATIC HYPERPLASIA WITH LOWER URINARY TRACT SYMPTOMS (LUTS): Primary | ICD-10-CM

## 2023-11-10 DIAGNOSIS — N52.9 ERECTILE DYSFUNCTION, UNSPECIFIED ERECTILE DYSFUNCTION TYPE: ICD-10-CM

## 2023-11-10 PROCEDURE — 99214 OFFICE O/P EST MOD 30 MIN: CPT | Performed by: UROLOGY

## 2023-11-10 PROCEDURE — 1159F MED LIST DOCD IN RCRD: CPT | Performed by: UROLOGY

## 2023-11-10 PROCEDURE — 1036F TOBACCO NON-USER: CPT | Performed by: UROLOGY

## 2023-11-10 PROCEDURE — 3078F DIAST BP <80 MM HG: CPT | Performed by: UROLOGY

## 2023-11-10 PROCEDURE — 1160F RVW MEDS BY RX/DR IN RCRD: CPT | Performed by: UROLOGY

## 2023-11-10 PROCEDURE — 1126F AMNT PAIN NOTED NONE PRSNT: CPT | Performed by: UROLOGY

## 2023-11-10 PROCEDURE — 3075F SYST BP GE 130 - 139MM HG: CPT | Performed by: UROLOGY

## 2023-11-10 RX ORDER — SILDENAFIL 100 MG/1
100 TABLET, FILM COATED ORAL DAILY PRN
Qty: 30 TABLET | Refills: 5 | Status: SHIPPED | OUTPATIENT
Start: 2023-11-10 | End: 2024-03-08 | Stop reason: ALTCHOICE

## 2023-11-30 ENCOUNTER — OFFICE VISIT (OUTPATIENT)
Dept: OPHTHALMOLOGY | Facility: CLINIC | Age: 78
End: 2023-11-30
Payer: MEDICARE

## 2023-11-30 ENCOUNTER — APPOINTMENT (OUTPATIENT)
Dept: OPHTHALMOLOGY | Facility: CLINIC | Age: 78
End: 2023-11-30
Payer: MEDICARE

## 2023-11-30 DIAGNOSIS — H40.1133 PRIMARY OPEN ANGLE GLAUCOMA (POAG) OF BOTH EYES, SEVERE STAGE: ICD-10-CM

## 2023-11-30 DIAGNOSIS — H02.112 CICATRICIAL ECTROPION OF LOWER EYELIDS OF BOTH EYES: ICD-10-CM

## 2023-11-30 DIAGNOSIS — H40.1130 PRIMARY OPEN ANGLE GLAUCOMA OF BOTH EYES, UNSPECIFIED GLAUCOMA STAGE: Primary | ICD-10-CM

## 2023-11-30 DIAGNOSIS — Z96.1 PSEUDOPHAKIA: ICD-10-CM

## 2023-11-30 DIAGNOSIS — H02.115 CICATRICIAL ECTROPION OF LOWER EYELIDS OF BOTH EYES: ICD-10-CM

## 2023-11-30 LAB
AVERAGE RNFL TODAY (OD): 59
AVERAGE RNFL TODAY (OS): 53
C/D RATIO TODAY (OD): 0.75
C/D RATIO TODAY (OS): 0.69

## 2023-11-30 PROCEDURE — 92133 CPTRZD OPH DX IMG PST SGM ON: CPT | Performed by: OPHTHALMOLOGY

## 2023-11-30 PROCEDURE — 76514 ECHO EXAM OF EYE THICKNESS: CPT | Performed by: OPHTHALMOLOGY

## 2023-11-30 PROCEDURE — 92004 COMPRE OPH EXAM NEW PT 1/>: CPT | Performed by: OPHTHALMOLOGY

## 2023-11-30 ASSESSMENT — EXTERNAL EXAM - LEFT EYE: OS_EXAM: NORMAL

## 2023-11-30 ASSESSMENT — REFRACTION_WEARINGRX
OS_SPHERE: -0.75
OD_AXIS: 004
OD_SPHERE: PLANO
OD_ADD: +2.75
OS_ADD: +2.75
OS_CYLINDER: -1.00
OD_CYLINDER: -0.50
OS_AXIS: 140

## 2023-11-30 ASSESSMENT — PACHYMETRY
OD_CT(UM): 572
OS_CT(UM): 587

## 2023-11-30 ASSESSMENT — CUP TO DISC RATIO
OD_RATIO: 0.85
OS_RATIO: 0.85

## 2023-11-30 ASSESSMENT — TONOMETRY
IOP_METHOD: GOLDMANN APPLANATION
OD_IOP_MMHG: 16

## 2023-11-30 ASSESSMENT — VISUAL ACUITY
OD_CC: 20/40-
OS_CC: 20/20
METHOD: SNELLEN - LINEAR

## 2023-11-30 ASSESSMENT — EXTERNAL EXAM - RIGHT EYE: OD_EXAM: NORMAL

## 2023-11-30 NOTE — PROGRESS NOTES
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

## 2023-12-04 ENCOUNTER — OFFICE VISIT (OUTPATIENT)
Dept: PRIMARY CARE | Facility: CLINIC | Age: 78
End: 2023-12-04
Payer: MEDICARE

## 2023-12-04 VITALS
DIASTOLIC BLOOD PRESSURE: 80 MMHG | HEART RATE: 68 BPM | WEIGHT: 177.8 LBS | TEMPERATURE: 98 F | SYSTOLIC BLOOD PRESSURE: 140 MMHG | RESPIRATION RATE: 16 BRPM | BODY MASS INDEX: 27.85 KG/M2

## 2023-12-04 DIAGNOSIS — R60.0 PEDAL EDEMA: ICD-10-CM

## 2023-12-04 DIAGNOSIS — I10 BENIGN ESSENTIAL HYPERTENSION: ICD-10-CM

## 2023-12-04 DIAGNOSIS — R26.81 GAIT INSTABILITY: ICD-10-CM

## 2023-12-04 DIAGNOSIS — G20.B1 PARKINSON'S DISEASE WITH DYSKINESIA, UNSPECIFIED WHETHER MANIFESTATIONS FLUCTUATE (MULTI): Primary | ICD-10-CM

## 2023-12-04 DIAGNOSIS — E78.49 OTHER HYPERLIPIDEMIA: ICD-10-CM

## 2023-12-04 PROBLEM — I73.9 PVD (PERIPHERAL VASCULAR DISEASE) (CMS-HCC): Status: ACTIVE | Noted: 2023-12-04

## 2023-12-04 PROBLEM — M19.079 ANKLE ARTHRITIS: Status: ACTIVE | Noted: 2023-12-04

## 2023-12-04 PROBLEM — K59.09 CHRONIC CONSTIPATION: Status: ACTIVE | Noted: 2023-12-04

## 2023-12-04 PROBLEM — M20.40 HT (HAMMER TOE): Status: ACTIVE | Noted: 2023-12-04

## 2023-12-04 PROBLEM — M19.079 ARTHRITIS OF FOOT: Status: ACTIVE | Noted: 2023-12-04

## 2023-12-04 PROCEDURE — 3079F DIAST BP 80-89 MM HG: CPT | Performed by: INTERNAL MEDICINE

## 2023-12-04 PROCEDURE — 99214 OFFICE O/P EST MOD 30 MIN: CPT | Performed by: INTERNAL MEDICINE

## 2023-12-04 PROCEDURE — 1159F MED LIST DOCD IN RCRD: CPT | Performed by: INTERNAL MEDICINE

## 2023-12-04 PROCEDURE — 1126F AMNT PAIN NOTED NONE PRSNT: CPT | Performed by: INTERNAL MEDICINE

## 2023-12-04 PROCEDURE — 1036F TOBACCO NON-USER: CPT | Performed by: INTERNAL MEDICINE

## 2023-12-04 PROCEDURE — 3077F SYST BP >= 140 MM HG: CPT | Performed by: INTERNAL MEDICINE

## 2023-12-04 PROCEDURE — 1160F RVW MEDS BY RX/DR IN RCRD: CPT | Performed by: INTERNAL MEDICINE

## 2023-12-04 RX ORDER — TORSEMIDE 20 MG/1
20 TABLET ORAL DAILY
Qty: 30 TABLET | Refills: 11 | Status: SHIPPED | OUTPATIENT
Start: 2023-12-04 | End: 2024-05-17

## 2023-12-04 NOTE — PROGRESS NOTES
Yaniv Bearden is a 78 y.o. male   Patient with a with history of hypertension, hyperlipidemia, Lumbar Spondylosis, Parkinson's disease, BPH with LUTS s/p HoLEP, Pedal edema, Padget's disease         Review of Systems     Constitutional: no fever, no chills, not feeling poorly, not feeling tired and no recent weight gain, no recent weight loss.   ENT: no earache, no hearing loss, no nosebleeds, no nasal discharge, no sore throat and no hoarseness.   Cardiovascular: the heart rate was not slow, the heart rate was not fast, no chest pain, no palpitations, no intermittent leg claudication   Respiratory: no cough, wheezing or shortness of breath at rest or exertion  Gastrointestinal: no abdominal pain, no constipation, no melena, no nausea, no diarrhea, no vomiting and no blood in stools.   Musculoskeletal: no arthralgias, no myalgias, no back pain, no joint swelling, no joint stiffness, no limb pain and no limb swelling.   Integumentary: no skin rashes, no skin lesions, no itching, no skin wound and no dry skin.   Neurological: no headache, no confusion, no numbness, no dizziness, no tingling and no fainting.   All other systems have been reviewed and are negative for complaint.       Vitals:    12/04/23 1148   BP: 140/80   Pulse: 68   Resp: 16   Temp: 36.7 °C (98 °F)        Physical Exam     Constitutional   General appearance: Alert and in no acute distress.     Pulmonary   Respiratory assessment: No respiratory distress, normal respiratory rhythm and effort.    Auscultation of Lungs: Clear bilateral breath sounds.   Cardiovascular   Auscultation of heart: Apical pulse normal, heart rate and rhythm normal, normal S1 and S2, no murmurs and no pericardial rub.    Exam for edema: ++ edema  Abdomen   Abdominal Exam: No bruits, normal bowel sounds, soft, non-tender, no abdominal mass palpated.    Liver and Spleen exam: No hepato-splenomegaly.   Musculoskeletal   Examination of gait: Normal.    Inspection of digits and  nails: No clubbing or cyanosis of the fingernails.    Inspection/palpation of joints, bones and muscles: No joint swelling. Normal movement of all extremities.   Skin   Skin inspection: Normal skin color and pigmentation, normal skin turgor and no visible rash.   Neurologic   Cranial nerves: Nerves 2-12 were intact, no focal neuro defects.     Assessment/Plan          Patient with a with history of hypertension, hyperlipidemia, Lumbar Spondylosis, Parkinson's disease, BPH with LUTS s/p HoLEP, Pedal edema, Padget's disease     # HTN/ Pedal edema  Not responding to lasix  Switch to Demadex  Compression stockings  Continue current medications     # Parkinson's Disease  Getting more symptomatic  Refer to Dr Ramos     # HLD  Stable  Continue current medications  Watch salt, avoid excessive caffeine  Avoid processed meats/ sugars/juices Instead eat fresh fruit  Add walnuts and almonds to your diet  exercise 6 days a week for 30 minutes

## 2023-12-21 DIAGNOSIS — I10 BENIGN ESSENTIAL HYPERTENSION: ICD-10-CM

## 2023-12-21 DIAGNOSIS — E78.5 HYPERLIPIDEMIA, UNSPECIFIED HYPERLIPIDEMIA TYPE: ICD-10-CM

## 2023-12-21 RX ORDER — SIMVASTATIN 40 MG/1
40 TABLET, FILM COATED ORAL NIGHTLY
Qty: 60 TABLET | Refills: 1 | Status: SHIPPED | OUTPATIENT
Start: 2023-12-21 | End: 2024-02-14 | Stop reason: SDUPTHER

## 2023-12-21 RX ORDER — LOSARTAN POTASSIUM 50 MG/1
50 TABLET ORAL DAILY
Qty: 90 TABLET | Refills: 0 | Status: SHIPPED | OUTPATIENT
Start: 2023-12-21 | End: 2024-02-14 | Stop reason: SDUPTHER

## 2023-12-21 NOTE — TELEPHONE ENCOUNTER
Rx Refill Request Telephone Encounter    Name:  Yaniv Bearden  :  [id number]  Specific Pharmacy location:  [facility]

## 2024-02-14 DIAGNOSIS — R60.0 PEDAL EDEMA: ICD-10-CM

## 2024-02-14 DIAGNOSIS — E78.5 HYPERLIPIDEMIA, UNSPECIFIED HYPERLIPIDEMIA TYPE: ICD-10-CM

## 2024-02-14 DIAGNOSIS — I10 BENIGN ESSENTIAL HYPERTENSION: ICD-10-CM

## 2024-02-14 RX ORDER — FUROSEMIDE 20 MG/1
20 TABLET ORAL DAILY
Qty: 90 TABLET | Refills: 0 | Status: SHIPPED | OUTPATIENT
Start: 2024-02-14 | End: 2024-03-08 | Stop reason: SDUPTHER

## 2024-02-14 RX ORDER — SIMVASTATIN 40 MG/1
40 TABLET, FILM COATED ORAL NIGHTLY
Qty: 60 TABLET | Refills: 1 | Status: SHIPPED | OUTPATIENT
Start: 2024-02-14 | End: 2024-06-13

## 2024-02-14 RX ORDER — FUROSEMIDE 20 MG/1
20 TABLET ORAL DAILY
COMMUNITY
End: 2024-02-14 | Stop reason: SDUPTHER

## 2024-02-14 RX ORDER — LOSARTAN POTASSIUM 50 MG/1
50 TABLET ORAL DAILY
Qty: 90 TABLET | Refills: 0 | Status: SHIPPED | OUTPATIENT
Start: 2024-02-14

## 2024-02-22 ENCOUNTER — OFFICE VISIT (OUTPATIENT)
Dept: NEUROLOGY | Facility: CLINIC | Age: 79
End: 2024-02-22
Payer: MEDICARE

## 2024-02-22 VITALS
HEART RATE: 59 BPM | BODY MASS INDEX: 26.78 KG/M2 | RESPIRATION RATE: 18 BRPM | WEIGHT: 171 LBS | SYSTOLIC BLOOD PRESSURE: 157 MMHG | DIASTOLIC BLOOD PRESSURE: 85 MMHG

## 2024-02-22 DIAGNOSIS — G20.B1 PARKINSON'S DISEASE WITH DYSKINESIA, UNSPECIFIED WHETHER MANIFESTATIONS FLUCTUATE (MULTI): ICD-10-CM

## 2024-02-22 DIAGNOSIS — G20.A1 PARKINSON'S DISEASE WITHOUT DYSKINESIA OR FLUCTUATING MANIFESTATIONS (MULTI): Primary | ICD-10-CM

## 2024-02-22 PROCEDURE — 99214 OFFICE O/P EST MOD 30 MIN: CPT | Performed by: PSYCHIATRY & NEUROLOGY

## 2024-02-22 PROCEDURE — 3079F DIAST BP 80-89 MM HG: CPT | Performed by: PSYCHIATRY & NEUROLOGY

## 2024-02-22 PROCEDURE — 1036F TOBACCO NON-USER: CPT | Performed by: PSYCHIATRY & NEUROLOGY

## 2024-02-22 PROCEDURE — 3077F SYST BP >= 140 MM HG: CPT | Performed by: PSYCHIATRY & NEUROLOGY

## 2024-02-22 PROCEDURE — 1126F AMNT PAIN NOTED NONE PRSNT: CPT | Performed by: PSYCHIATRY & NEUROLOGY

## 2024-02-22 PROCEDURE — 1159F MED LIST DOCD IN RCRD: CPT | Performed by: PSYCHIATRY & NEUROLOGY

## 2024-02-22 RX ORDER — BRIMONIDINE TARTRATE 2 MG/ML
1 SOLUTION/ DROPS OPHTHALMIC 2 TIMES DAILY
COMMUNITY
Start: 2018-02-08 | End: 2024-05-17 | Stop reason: ALTCHOICE

## 2024-02-22 RX ORDER — CARBIDOPA AND LEVODOPA 25; 100 MG/1; MG/1
1 TABLET ORAL 3 TIMES DAILY
Qty: 270 TABLET | Refills: 3 | Status: SHIPPED | OUTPATIENT
Start: 2024-02-22 | End: 2025-02-21

## 2024-02-22 ASSESSMENT — ENCOUNTER SYMPTOMS
LOSS OF SENSATION IN FEET: 0
OCCASIONAL FEELINGS OF UNSTEADINESS: 0
DEPRESSION: 0

## 2024-02-22 ASSESSMENT — PATIENT HEALTH QUESTIONNAIRE - PHQ9
1. LITTLE INTEREST OR PLEASURE IN DOING THINGS: NOT AT ALL
SUM OF ALL RESPONSES TO PHQ9 QUESTIONS 1 AND 2: 0
2. FEELING DOWN, DEPRESSED OR HOPELESS: NOT AT ALL

## 2024-02-22 NOTE — PATIENT INSTRUCTIONS
- I recommend developing exercise routine with fast paced exercise preferably cycling up to 80 cycles per minute. The exercise routine should be at least 30 minutes at a time and at least 3 days a week.      - I'm glad you experienced some improvement with carbidopa/levodopa 25/100 mg. please take one tablet three times daily at 12 PM, 4 PM, and 8 PM. We may increase the dose in the future.      - Follow up in 3 to 6 months with me.    Thank you for visiting the clinic today          Iain Ramos MD, PhD   of Neurology  [employer]  [facility]

## 2024-02-22 NOTE — PROGRESS NOTES
Chief Complaint  gait, tremor.      History of Present Illness  This is a 77 yo right handed AAM with hx of HTN, chronic back pain, and Paget's disease who is here for PD follow up.      Interval hx:  Thinks sinemet helped some especially with slowness. His partner is not sure it helps. He ran out of medication a week ago and feels a bit slower. No side effects.      PD symptom review:      KEYON DOWNING is here for an initial evaluation. He is a right-handed 78 year-old man   He is currently experiencing the following symptoms: loss of postural reflexes, postural instability and bradykinesia, but no tremor at rest, no pill-rolling hand tremor, no rigidity of movement, no frequent falls, no depression and no anxiety.    Previous medications included: .   This problem has not been previously treated with medications.   He reports balance problems, but no falls.   He reports speech problems, but no dysphagia . voice is weak for the past six months.   He reports no urinary incontinence, no urinary retention, constipation and no orthostatic symptoms.   He reports he has no symptoms of depression and no anxiety.   He reports difficulty falling asleep, but no difficulty staying asleep, no acting out of dreams and no restless legs.   He reports no concern about memory and no hallucinations.      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)   · Parkinson's disease (332.0) (G20)   · Phimosis (605) (N47.1)   · Right shoulder  pain (719.41) (M25.511)   · Rotator cuff arthropathy (716.81) (M12.819)   · Screening for colon cancer (V76.51) (Z12.11)   · Urinary retention (788.20) (R33.9)   · Urinary tract infection (599.0) (N39.0)   · Venous insufficiency (459.81) (I87.2)     Family History  Mother    · No pertinent family history     Social History  Problems    · Minimum alcohol consumption   · Never a smoker     Allergies  Medication    · No Known Drug Allergies   Recorded By: Luna Crawley; 6/11/2014 2:00:08 PM       Physical Exam  Constitutional: General appearance: no acute distress   Peripheral Vascular Exam: Pulses +2 and equal in all extremities. No swelling, varicosities, edema or tenderness to palpations   Mental status: The patient was in no distress, alert, interactive and cooperative. Affect is appropriate.   Orientation: oriented to person, oriented to place and oriented to time.   Memory: recent memory intact and remote memory intact.   Attention: normal attention span and normal concentrating ability.   Language: normal comprehension and no difficulty naming common objects.   Fund of knowledge: Patient displays adequate knowledge of current events, adequate fund of knowledge regarding past history and adequate fund of knowledge regarding vocabulary. marked hypophonia   Cranial nerve II: Visual fields full to confrontation.   Cranial nerves III, IV, and VI: Pupils round, equally reactive to light; no ptosis. EOMs intact. No nystagmus. vertical gaze intact.   Cranial Nerve V: Facial sensation intact bilaterally.   Cranial nerve VII: Normal and symmetric facial strength. marked hypomimia.   Cranial nerve VIII: Hearing is intact bilaterally to finger rub / whisper.   Cranial nerves IX and X: Palate elevates symmetrically.   Cranial nerve XI: Shoulder shrug and neck rotation strength are intact.   Cranial nerve XII: Tongue midline with normal strength.   Motor: Muscle bulk was normal in both upper and lower extremities. mild to  moderate lead pipe rigidity more on the left. Muscle strength was 5/5 throughout. moderate postural/action > rest tremor in both hands more on the left.   Deep Tendon Reflexes: left biceps 2+ , right biceps 2+, left triceps 2+, right triceps 2+, left brachioradialis 2+, right brachioradialis 2+, left patella 2+, right patella 2+, left ankle jerk 2+, right ankle jerk 2+   Sensory Exam:. no hemihyposthesia.   Coordination: There is no limb dystaxia and rapid alternating movements are intact. severe bradykinesia bilaterally more on the left with finger tapping, hand movement, pronation/supintation. Mild bradykinesia with leg agility and toe tapping more on the left.   Gait:. short steppage shuffling with en block turning but no freezing orfestination. Has moderate stooping. Normal balance to the pull test.      UPDRS Examination   Speech: 2 Facial Expression: 2   Rest Tremor: Head: 0 RUE: 1 LUE: 2 RLE: 0 LLE: 0   Action Tremor: RUE: 2 LUE: 3   Rigidity: Neck: 3 RUE: 1 LUE: 2 RLE: 0 LLE: 0   Finger Taps: RUE: 3 LUE: 3   Hand Movements: RUE: 2 LUE: 3   Rapid Alternating Movements: RUE: 2 LUE: 3   Leg Agility: RLE: 1 LLE: 2   Arising from chair: 1 Posture: 2 Gait: 1 Postural Stability: 0 Body Bradykinsia: 3   Total Scores   Total UPDRS III Off Medication: 44.   Etta and Yahr Stage: Etta and Yahr Stage 3.       Provider Impressions  This is a 77 yo right handed AAM with hx of HTN, chronic back pain, and Paget's disease who presents with longstanding hand action tremor and more recent slowness, shuffling, and unsteadiness. He denied PD prodromal symptoms. Exam positive for asymmetric L>R parkinsonism. I personally reviewed his brain CT from 2/2022, which showed age-related atrophy. 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 will start sinemet.        2/22/2024: Thinks sinemet helped some especially with slowness. His partner is not sure it helps. He ran out of medication a week ago and feels a bit slower. No side  effects. Will restart sinemet and plan to gradually increase dose over time.     PLAN:  - I recommend developing exercise routine with fast paced exercise preferably cycling up to 80 cycles per minute. The exercise routine should be at least 30 minutes at a time and at least 3 days a week.      - I'm glad you experienced some improvement with carbidopa/levodopa 25/100 mg. please take one tablet three times daily at 12 PM, 4 PM, and 8 PM. We may increase the dose in the future.      - Follow up in 3 to 6 months with me.    Thank you for visiting the clinic today          Iain Ramos MD, PhD   of Neurology  [facility]  [facility]

## 2024-02-23 ENCOUNTER — APPOINTMENT (OUTPATIENT)
Dept: UROLOGY | Facility: HOSPITAL | Age: 79
End: 2024-02-23
Payer: COMMERCIAL

## 2024-03-02 ENCOUNTER — APPOINTMENT (OUTPATIENT)
Dept: OPHTHALMOLOGY | Facility: CLINIC | Age: 79
End: 2024-03-02

## 2024-03-08 ENCOUNTER — LAB (OUTPATIENT)
Dept: LAB | Facility: LAB | Age: 79
End: 2024-03-08
Payer: MEDICARE

## 2024-03-08 ENCOUNTER — OFFICE VISIT (OUTPATIENT)
Dept: PRIMARY CARE | Facility: CLINIC | Age: 79
End: 2024-03-08
Payer: MEDICARE

## 2024-03-08 VITALS
DIASTOLIC BLOOD PRESSURE: 80 MMHG | TEMPERATURE: 98.5 F | HEART RATE: 66 BPM | RESPIRATION RATE: 16 BRPM | WEIGHT: 173 LBS | BODY MASS INDEX: 27.1 KG/M2 | SYSTOLIC BLOOD PRESSURE: 140 MMHG

## 2024-03-08 DIAGNOSIS — R35.1 NOCTURIA ASSOCIATED WITH BENIGN PROSTATIC HYPERPLASIA: ICD-10-CM

## 2024-03-08 DIAGNOSIS — I10 BENIGN ESSENTIAL HYPERTENSION: ICD-10-CM

## 2024-03-08 DIAGNOSIS — I73.9 PVD (PERIPHERAL VASCULAR DISEASE) (CMS-HCC): ICD-10-CM

## 2024-03-08 DIAGNOSIS — I87.2 VENOUS INSUFFICIENCY: ICD-10-CM

## 2024-03-08 DIAGNOSIS — N40.1 NOCTURIA ASSOCIATED WITH BENIGN PROSTATIC HYPERPLASIA: ICD-10-CM

## 2024-03-08 DIAGNOSIS — G20.B1 PARKINSON'S DISEASE WITH DYSKINESIA, UNSPECIFIED WHETHER MANIFESTATIONS FLUCTUATE (MULTI): ICD-10-CM

## 2024-03-08 DIAGNOSIS — R60.0 PEDAL EDEMA: ICD-10-CM

## 2024-03-08 DIAGNOSIS — I10 BENIGN ESSENTIAL HYPERTENSION: Primary | ICD-10-CM

## 2024-03-08 DIAGNOSIS — E78.49 OTHER HYPERLIPIDEMIA: ICD-10-CM

## 2024-03-08 LAB
ALBUMIN SERPL BCP-MCNC: 4.5 G/DL (ref 3.4–5)
ALP SERPL-CCNC: 66 U/L (ref 33–136)
ALT SERPL W P-5'-P-CCNC: 8 U/L (ref 10–52)
ANION GAP SERPL CALC-SCNC: 13 MMOL/L (ref 10–20)
AST SERPL W P-5'-P-CCNC: 16 U/L (ref 9–39)
BILIRUB SERPL-MCNC: 0.9 MG/DL (ref 0–1.2)
BUN SERPL-MCNC: 14 MG/DL (ref 6–23)
CALCIUM SERPL-MCNC: 9.6 MG/DL (ref 8.6–10.3)
CHLORIDE SERPL-SCNC: 102 MMOL/L (ref 98–107)
CHOLEST SERPL-MCNC: 144 MG/DL (ref 0–199)
CHOLESTEROL/HDL RATIO: 2.7
CO2 SERPL-SCNC: 29 MMOL/L (ref 21–32)
CREAT SERPL-MCNC: 1.08 MG/DL (ref 0.5–1.3)
EGFRCR SERPLBLD CKD-EPI 2021: 69 ML/MIN/1.73M*2
ERYTHROCYTE [DISTWIDTH] IN BLOOD BY AUTOMATED COUNT: 12.9 % (ref 11.5–14.5)
GLUCOSE SERPL-MCNC: 101 MG/DL (ref 74–99)
HCT VFR BLD AUTO: 41.4 % (ref 41–52)
HDLC SERPL-MCNC: 52.9 MG/DL
HGB BLD-MCNC: 14 G/DL (ref 13.5–17.5)
LDLC SERPL CALC-MCNC: 75 MG/DL
MCH RBC QN AUTO: 30.8 PG (ref 26–34)
MCHC RBC AUTO-ENTMCNC: 33.8 G/DL (ref 32–36)
MCV RBC AUTO: 91 FL (ref 80–100)
NON HDL CHOLESTEROL: 91 MG/DL (ref 0–149)
NRBC BLD-RTO: 0 /100 WBCS (ref 0–0)
PLATELET # BLD AUTO: 180 X10*3/UL (ref 150–450)
POTASSIUM SERPL-SCNC: 3.9 MMOL/L (ref 3.5–5.3)
PROT SERPL-MCNC: 7.4 G/DL (ref 6.4–8.2)
PSA SERPL-MCNC: 0.78 NG/ML
RBC # BLD AUTO: 4.55 X10*6/UL (ref 4.5–5.9)
SODIUM SERPL-SCNC: 140 MMOL/L (ref 136–145)
TRIGL SERPL-MCNC: 82 MG/DL (ref 0–149)
TSH SERPL-ACNC: 2.24 MIU/L (ref 0.44–3.98)
VLDL: 16 MG/DL (ref 0–40)
WBC # BLD AUTO: 6.4 X10*3/UL (ref 4.4–11.3)

## 2024-03-08 PROCEDURE — 1126F AMNT PAIN NOTED NONE PRSNT: CPT | Performed by: INTERNAL MEDICINE

## 2024-03-08 PROCEDURE — 85027 COMPLETE CBC AUTOMATED: CPT

## 2024-03-08 PROCEDURE — 3077F SYST BP >= 140 MM HG: CPT | Performed by: INTERNAL MEDICINE

## 2024-03-08 PROCEDURE — 80053 COMPREHEN METABOLIC PANEL: CPT

## 2024-03-08 PROCEDURE — 80061 LIPID PANEL: CPT

## 2024-03-08 PROCEDURE — 84153 ASSAY OF PSA TOTAL: CPT

## 2024-03-08 PROCEDURE — 84443 ASSAY THYROID STIM HORMONE: CPT

## 2024-03-08 PROCEDURE — 36415 COLL VENOUS BLD VENIPUNCTURE: CPT

## 2024-03-08 PROCEDURE — 1036F TOBACCO NON-USER: CPT | Performed by: INTERNAL MEDICINE

## 2024-03-08 PROCEDURE — 1159F MED LIST DOCD IN RCRD: CPT | Performed by: INTERNAL MEDICINE

## 2024-03-08 PROCEDURE — 99214 OFFICE O/P EST MOD 30 MIN: CPT | Performed by: INTERNAL MEDICINE

## 2024-03-08 PROCEDURE — 3079F DIAST BP 80-89 MM HG: CPT | Performed by: INTERNAL MEDICINE

## 2024-03-08 RX ORDER — FUROSEMIDE 40 MG/1
40 TABLET ORAL DAILY
Qty: 30 TABLET | Refills: 2 | Status: SHIPPED | OUTPATIENT
Start: 2024-03-08 | End: 2024-06-07

## 2024-03-08 NOTE — PROGRESS NOTES
Yaniv Bearden is a 78 y.o. male   Patient with a with history of hypertension, hyperlipidemia, Lumbar Spondylosis, Parkinson's disease, BPH with LUTS s/p HoLEP, Pedal edema, Padget's disease, Glaucoma    Generalized aches and pains  C/o swelling in feet  No SOB though    No chest pain/  SOB/ dizziness  BM OK  Energy level ok  Appetite OK           Review of Systems     Constitutional: no fever, no chills, not feeling poorly, not feeling tired and no recent weight gain, no recent weight loss.   ENT: no earache, no hearing loss, no nosebleeds, no nasal discharge, no sore throat and no hoarseness.   Cardiovascular: the heart rate was not slow, the heart rate was not fast, no chest pain, no palpitations, no intermittent leg claudication   Respiratory: no cough, wheezing or shortness of breath at rest or exertion  Gastrointestinal: no abdominal pain, no constipation, no melena, no nausea, no diarrhea, no vomiting and no blood in stools.   Musculoskeletal: no arthralgias, no myalgias, no back pain, no joint swelling, no joint stiffness, no limb pain and no limb swelling.   Integumentary: no skin rashes, no skin lesions, no itching, no skin wound and no dry skin.   Neurological: no headache, no confusion, no numbness, no dizziness, no tingling and no fainting.   All other systems have been reviewed and are negative for complaint.       There were no vitals filed for this visit.       Physical Exam     Constitutional   General appearance: Alert and in no acute distress.     Pulmonary   Respiratory assessment: No respiratory distress, normal respiratory rhythm and effort.    Auscultation of Lungs: Clear bilateral breath sounds.   Cardiovascular   Auscultation of heart: Apical pulse normal, heart rate and rhythm normal, normal S1 and S2, no murmurs and no pericardial rub.    Exam for edema: ++ edema  Abdomen   Abdominal Exam: No bruits, normal bowel sounds, soft, non-tender, no abdominal mass palpated.    Liver and Spleen  exam: No hepato-splenomegaly.   Musculoskeletal   Examination of gait: Normal.    Inspection of digits and nails: No clubbing or cyanosis of the fingernails.    Inspection/palpation of joints, bones and muscles: No joint swelling. Normal movement of all extremities.   Skin   Skin inspection: Normal skin color and pigmentation, normal skin turgor and no visible rash.   Neurologic   Cranial nerves: Nerves 2-12 were intact, no focal neuro defects.     Assessment/Plan          Patient with a with history of hypertension, hyperlipidemia, Lumbar Spondylosis, Parkinson's disease, BPH with LUTS s/p HoLEP, Pedal edema, Padget's disease, Glaucoma    # HTN/ Pedal edema  Did not tolerate Bumex (made him go to the bathroom a lot)  So was back on lasix  Increase lasix to 40 mg     # Parkinson's Disease  Getting more symptomatic  Management as per  Dr Ramos     # HLD  Stable  Continue current medications  Watch salt, avoid excessive caffeine  Avoid processed meats/ sugars/juices Instead eat fresh fruit  Add walnuts and almonds to your diet  exercise 6 days a week for 30 minutes    Blood work

## 2024-03-24 ENCOUNTER — HOSPITAL ENCOUNTER (EMERGENCY)
Facility: HOSPITAL | Age: 79
Discharge: HOME | End: 2024-03-24
Attending: EMERGENCY MEDICINE
Payer: MEDICARE

## 2024-03-24 VITALS
WEIGHT: 173 LBS | RESPIRATION RATE: 18 BRPM | HEIGHT: 67 IN | DIASTOLIC BLOOD PRESSURE: 7 MMHG | OXYGEN SATURATION: 100 % | SYSTOLIC BLOOD PRESSURE: 128 MMHG | TEMPERATURE: 98.9 F | HEART RATE: 57 BPM | BODY MASS INDEX: 27.15 KG/M2

## 2024-03-24 DIAGNOSIS — J01.00 ACUTE MAXILLARY SINUSITIS, RECURRENCE NOT SPECIFIED: Primary | ICD-10-CM

## 2024-03-24 LAB
FLUAV RNA RESP QL NAA+PROBE: NOT DETECTED
FLUBV RNA RESP QL NAA+PROBE: NOT DETECTED
SARS-COV-2 RNA RESP QL NAA+PROBE: NOT DETECTED

## 2024-03-24 PROCEDURE — 99283 EMERGENCY DEPT VISIT LOW MDM: CPT | Performed by: EMERGENCY MEDICINE

## 2024-03-24 PROCEDURE — 2500000001 HC RX 250 WO HCPCS SELF ADMINISTERED DRUGS (ALT 637 FOR MEDICARE OP): Performed by: EMERGENCY MEDICINE

## 2024-03-24 PROCEDURE — 87636 SARSCOV2 & INF A&B AMP PRB: CPT | Performed by: EMERGENCY MEDICINE

## 2024-03-24 RX ORDER — AMOXICILLIN 500 MG/1
500 CAPSULE ORAL ONCE
Status: COMPLETED | OUTPATIENT
Start: 2024-03-24 | End: 2024-03-24

## 2024-03-24 RX ORDER — AMOXICILLIN 875 MG/1
875 TABLET, FILM COATED ORAL 2 TIMES DAILY
Qty: 20 TABLET | Refills: 0 | Status: SHIPPED | OUTPATIENT
Start: 2024-03-24 | End: 2024-04-03

## 2024-03-24 RX ADMIN — AMOXICILLIN 500 MG: 500 CAPSULE ORAL at 19:40

## 2024-03-24 ASSESSMENT — PAIN SCALES - GENERAL: PAINLEVEL_OUTOF10: 0 - NO PAIN

## 2024-03-24 ASSESSMENT — COLUMBIA-SUICIDE SEVERITY RATING SCALE - C-SSRS
1. IN THE PAST MONTH, HAVE YOU WISHED YOU WERE DEAD OR WISHED YOU COULD GO TO SLEEP AND NOT WAKE UP?: NO
6. HAVE YOU EVER DONE ANYTHING, STARTED TO DO ANYTHING, OR PREPARED TO DO ANYTHING TO END YOUR LIFE?: NO
2. HAVE YOU ACTUALLY HAD ANY THOUGHTS OF KILLING YOURSELF?: NO

## 2024-03-24 ASSESSMENT — PAIN - FUNCTIONAL ASSESSMENT: PAIN_FUNCTIONAL_ASSESSMENT: 0-10

## 2024-03-24 NOTE — ED PROVIDER NOTES
HPI   Chief Complaint   Patient presents with    Cough       This is an 78-year-old male who presents to the emergency department complaining of sinus congestion and drainage for the past 1-1/2 weeks.  The patient reports that he was seen at urgent care center and had normal swabs.  His symptoms have persisted.  He reports postnasal drip.  He reports cough which is nonproductive.  He reports facial pain.  He denies fevers and chills.                          [place] Coma Scale Score: 15                     Patient History   Past Medical History:   Diagnosis Date    Arthritis     Hypertension     Parkinson disease      Past Surgical History:   Procedure Laterality Date    PROSTATE SURGERY       Family History   Problem Relation Name Age of Onset    No Known Problems Mother      No Known Problems Father       Social History     Tobacco Use    Smoking status: Never    Smokeless tobacco: Never   Vaping Use    Vaping Use: Never used   Substance Use Topics    Alcohol use: Never    Drug use: Never       Physical Exam   ED Triage Vitals [03/24/24 1804]   Temperature Heart Rate Respirations BP   37.2 °C (98.9 °F) 60 18 154/77      Pulse Ox Temp Source Heart Rate Source Patient Position   98 % Temporal Monitor Sitting      BP Location FiO2 (%)     Left arm --       Physical Exam  Vitals and nursing note reviewed.   HENT:      Head: Normocephalic and atraumatic.      Right Ear: Tympanic membrane normal.      Left Ear: Tympanic membrane normal.      Nose: Mucosal edema and congestion present.      Right Sinus: Maxillary sinus tenderness present.      Left Sinus: Maxillary sinus tenderness present.   Eyes:      Conjunctiva/sclera: Conjunctivae normal.   Cardiovascular:      Rate and Rhythm: Normal rate and regular rhythm.      Pulses: Normal pulses.      Heart sounds: Normal heart sounds.   Pulmonary:      Effort: Pulmonary effort is normal.      Breath sounds: Normal breath sounds.   Abdominal:      General: Bowel sounds are  normal.      Palpations: Abdomen is soft.   Musculoskeletal:         General: Normal range of motion.      Cervical back: Normal range of motion and neck supple.   Skin:     Findings: No rash.   Neurological:      General: No focal deficit present.      Mental Status: He is alert and oriented to person, place, and time.   Psychiatric:         Mood and Affect: Mood normal.         ED Course & MDM   Diagnoses as of 03/24/24 1918   Acute maxillary sinusitis, recurrence not specified       Medical Decision Making  Differential diagnosis considered: Sinusitis, bronchitis, pneumonia, COVID, influenza    This is an 78-year-old male who presents the emergency department with 1-1/2 weeks of sinus pain and congestion.  He was negative for flu and COVID when 8 weeks ago.  He was negative again in the emergency department.  Concern for bacterial infection.  He will be treated with amoxicillin.  He will follow-up as an outpatient.        Procedure  Procedures     Lobo Garcia MD  03/24/24 1919

## 2024-04-26 ENCOUNTER — PREP FOR PROCEDURE (OUTPATIENT)
Dept: UROLOGY | Facility: HOSPITAL | Age: 79
End: 2024-04-26

## 2024-04-26 ENCOUNTER — OFFICE VISIT (OUTPATIENT)
Dept: UROLOGY | Facility: HOSPITAL | Age: 79
End: 2024-04-26
Payer: MEDICARE

## 2024-04-26 DIAGNOSIS — Z87.438: ICD-10-CM

## 2024-04-26 DIAGNOSIS — K40.90 RIGHT INGUINAL HERNIA: ICD-10-CM

## 2024-04-26 DIAGNOSIS — N47.1 PHIMOSIS: Primary | ICD-10-CM

## 2024-04-26 DIAGNOSIS — N47.8 FORESKIN PROBLEM: ICD-10-CM

## 2024-04-26 PROCEDURE — 99214 OFFICE O/P EST MOD 30 MIN: CPT | Performed by: UROLOGY

## 2024-04-26 PROCEDURE — 1159F MED LIST DOCD IN RCRD: CPT | Performed by: UROLOGY

## 2024-04-26 RX ORDER — CEFAZOLIN SODIUM 2 G/100ML
2 INJECTION, SOLUTION INTRAVENOUS ONCE
Status: CANCELLED | OUTPATIENT
Start: 2024-04-26 | End: 2024-04-26

## 2024-04-26 NOTE — PROGRESS NOTES
HPI    78 y.o. male being seen with the following problem list:    Problem list:  1.BPH/retention s/p HoLEP 5/2022 with Dr. Johnson  2. Phimosis    Patient was in urinary retention with a prostate volume of 138 cmÂ³. He underwent an uneventful holep on May 28, 2022. He developed significant hematuria in the postoperative area and was taken back for clot evacuation and fulguration.  He passed a voiding trial on May 23, 2022. Pathology showed 47.6 g of benign prostatic tissue     Last seen by Dr. johnson 6/10/22. good stream, no leakage, PVR 0ml     9/20/22 - comes in for follow up. Voiding well. Has some baseline urgency but no incontinence. Having issues retracting his foreskin.     11/15/22- Foreskin is manageable, has been using the cream. Voiding well. Urgency is minimal. Would not like anything done at this point.          11/10/23 - seen today for 1yr fuv with PVR. No urinary issues. PVR 1cc. Foreskin problem resolved. Notes longstanding ED, naive to meds. Has PD. No nitrates         3/20/24 - seen via thv for 3mo medication response to Sildenafil 100mg po prn. He currently has the flu. He reports that the sildenafil is helping with his erections. He is still having trouble with his foreskin, would like to come in to discuss this further in June/July 04/26/24 - notes more issues with his foreskin. Can retract, but very redundant and hard to pull back down, gets in the way, would like a circumcision. Also notes a bothersome bulge in his R groin.        Lab Results   Component Value Date    PSA 0.69 02/24/2023    PSA 6.02 (H) 02/17/2022         Current Medications:  Current Outpatient Medications   Medication Sig Dispense Refill    brimonidine (AlphaGAN) 0.2 % ophthalmic solution Administer 1 drop into affected eye(s) twice a day.      carbidopa-levodopa (Sinemet)  mg tablet Take 1 tablet by mouth 3 times a day. Take one tablet 3 times daily at 12 PM, 4 PM, 8 PM. 270 tablet 3    dorzolamide-timoloL (Cosopt)  22.3-6.8 mg/mL ophthalmic solution Dorzolamide HCl-Timolol Mal 22.3-6.8 MG/ML Ophthalmic Solution   Quantity: 10  Refills: 0        Start : 2-Mar-2021   Active      furosemide (Lasix) 40 mg tablet Take 1 tablet (40 mg) by mouth once daily. 30 tablet 2    latanoprost (Xalatan) 0.005 % ophthalmic solution Administer 1 drop into both eyes at bedtime.      losartan (Cozaar) 50 mg tablet Take 1 tablet (50 mg) by mouth once daily. 90 tablet 0    simvastatin (Zocor) 40 mg tablet Take 1 tablet (40 mg) by mouth once daily at bedtime. 60 tablet 1    torsemide (Demadex) 20 mg tablet Take 1 tablet (20 mg) by mouth once daily. 30 tablet 11     No current facility-administered medications for this visit.        Active Problems:  Yaniv Bearden is a 78 y.o. male with the following Problems and Medications.  Patient Active Problem List   Diagnosis    Acquired spondylolisthesis    Back pain    Benign essential hypertension    BPH with obstruction/lower urinary tract symptoms    Calf swelling    Enlarged prostate without lower urinary tract symptoms (luts)    Frequent urination    Gait instability    Hematuria    Paget's bone disease    Venous insufficiency    Urinary tract infection    Urinary retention    Rotator cuff arthropathy    Right shoulder pain    Phimosis    Parkinson's disease with dyskinesia, unspecified whether manifestations fluctuate (Multi)    Hyperlipemia    Pedal edema    PVD (peripheral vascular disease) ([facility])    Chronic constipation    HT (hammer toe)    Arthritis of foot    Ankle arthritis     Current Outpatient Medications   Medication Sig Dispense Refill    brimonidine (AlphaGAN) 0.2 % ophthalmic solution Administer 1 drop into affected eye(s) twice a day.      carbidopa-levodopa (Sinemet)  mg tablet Take 1 tablet by mouth 3 times a day. Take one tablet 3 times daily at 12 PM, 4 PM, 8 PM. 270 tablet 3    dorzolamide-timoloL (Cosopt) 22.3-6.8 mg/mL ophthalmic solution Dorzolamide HCl-Timolol Mal  22.3-6.8 MG/ML Ophthalmic Solution   Quantity: 10  Refills: 0        Start : 2-Mar-2021   Active      furosemide (Lasix) 40 mg tablet Take 1 tablet (40 mg) by mouth once daily. 30 tablet 2    latanoprost (Xalatan) 0.005 % ophthalmic solution Administer 1 drop into both eyes at bedtime.      losartan (Cozaar) 50 mg tablet Take 1 tablet (50 mg) by mouth once daily. 90 tablet 0    simvastatin (Zocor) 40 mg tablet Take 1 tablet (40 mg) by mouth once daily at bedtime. 60 tablet 1    torsemide (Demadex) 20 mg tablet Take 1 tablet (20 mg) by mouth once daily. 30 tablet 11     No current facility-administered medications for this visit.       PMH:  Past Medical History:   Diagnosis Date    Arthritis     Hypertension     Parkinson disease (Multi)        PSH:  Past Surgical History:   Procedure Laterality Date    PROSTATE SURGERY         FMH:  Family History   Problem Relation Name Age of Onset    No Known Problems Mother      No Known Problems Father         SHx:  Social History     Tobacco Use    Smoking status: Never    Smokeless tobacco: Never   Vaping Use    Vaping status: Never Used   Substance Use Topics    Alcohol use: Never    Drug use: Never       Allergies:  No Known Allergies    Physical Exam:  R inguinal bulge, reducible, c/w direct inguinal hernia  Tight foreskin, can retract, but somewhat difficult to pull back down.    Assessment/Plan    R direct inguinal hernia, bothersome. Will refer to one of my general surgery colleagues to discuss options.    Bothersome phimosis, foreskin issues. He would like a circumcision. Discussed procedure, expectations. Discussed importance of postop care, risk of acquired buried penis. He will need to retract penile skin off incision fully 2x a day and apply vaseline to prevent the skin from healing on the incision.    If he is a candidate for hernia surgery, can try to coordinate to do the circumcision at the same time.       Scribe Attestation  By signing my name below, Evita PALMA  Lb Gonzalez, attest that this documentation  has been prepared under the direction and in the presence of Ramy Castle MD.

## 2024-05-17 ENCOUNTER — CLINICAL SUPPORT (OUTPATIENT)
Dept: PREADMISSION TESTING | Facility: HOSPITAL | Age: 79
End: 2024-05-17
Payer: MEDICARE

## 2024-05-17 DIAGNOSIS — N47.1 PHIMOSIS: ICD-10-CM

## 2024-05-17 RX ORDER — IBUPROFEN 200 MG
200 TABLET ORAL EVERY 6 HOURS PRN
COMMUNITY
End: 2024-06-07 | Stop reason: HOSPADM

## 2024-05-17 RX ORDER — BISMUTH SUBSALICYLATE 262 MG
1 TABLET,CHEWABLE ORAL DAILY
COMMUNITY

## 2024-05-24 ENCOUNTER — LAB (OUTPATIENT)
Dept: LAB | Facility: LAB | Age: 79
End: 2024-05-24
Payer: MEDICARE

## 2024-05-24 ENCOUNTER — PRE-ADMISSION TESTING (OUTPATIENT)
Dept: PREADMISSION TESTING | Facility: HOSPITAL | Age: 79
End: 2024-05-24
Payer: MEDICARE

## 2024-05-24 VITALS
WEIGHT: 172.18 LBS | SYSTOLIC BLOOD PRESSURE: 135 MMHG | DIASTOLIC BLOOD PRESSURE: 69 MMHG | RESPIRATION RATE: 18 BRPM | OXYGEN SATURATION: 100 % | TEMPERATURE: 97.3 F | HEIGHT: 66 IN | HEART RATE: 56 BPM | BODY MASS INDEX: 27.67 KG/M2

## 2024-05-24 DIAGNOSIS — N47.1 PHIMOSIS: ICD-10-CM

## 2024-05-24 DIAGNOSIS — Z01.818 PRE-OP TESTING: Primary | ICD-10-CM

## 2024-05-24 DIAGNOSIS — Z87.438: ICD-10-CM

## 2024-05-24 LAB
ANION GAP SERPL CALC-SCNC: 11 MMOL/L (ref 10–20)
APPEARANCE UR: CLEAR
BILIRUB UR STRIP.AUTO-MCNC: NEGATIVE MG/DL
BUN SERPL-MCNC: 14 MG/DL (ref 6–23)
CALCIUM SERPL-MCNC: 9.6 MG/DL (ref 8.6–10.3)
CHLORIDE SERPL-SCNC: 103 MMOL/L (ref 98–107)
CO2 SERPL-SCNC: 31 MMOL/L (ref 21–32)
COLOR UR: YELLOW
CREAT SERPL-MCNC: 1.07 MG/DL (ref 0.5–1.3)
EGFRCR SERPLBLD CKD-EPI 2021: 70 ML/MIN/1.73M*2
ERYTHROCYTE [DISTWIDTH] IN BLOOD BY AUTOMATED COUNT: 13.1 % (ref 11.5–14.5)
GLUCOSE SERPL-MCNC: 82 MG/DL (ref 74–99)
GLUCOSE UR STRIP.AUTO-MCNC: NORMAL MG/DL
HCT VFR BLD AUTO: 40.4 % (ref 41–52)
HGB BLD-MCNC: 13.4 G/DL (ref 13.5–17.5)
KETONES UR STRIP.AUTO-MCNC: NEGATIVE MG/DL
LEUKOCYTE ESTERASE UR QL STRIP.AUTO: NEGATIVE
MCH RBC QN AUTO: 30.3 PG (ref 26–34)
MCHC RBC AUTO-ENTMCNC: 33.2 G/DL (ref 32–36)
MCV RBC AUTO: 91 FL (ref 80–100)
NITRITE UR QL STRIP.AUTO: NEGATIVE
NRBC BLD-RTO: 0 /100 WBCS (ref 0–0)
PH UR STRIP.AUTO: 6.5 [PH]
PLATELET # BLD AUTO: 159 X10*3/UL (ref 150–450)
POTASSIUM SERPL-SCNC: 4.1 MMOL/L (ref 3.5–5.3)
PROT UR STRIP.AUTO-MCNC: NEGATIVE MG/DL
RBC # BLD AUTO: 4.42 X10*6/UL (ref 4.5–5.9)
RBC # UR STRIP.AUTO: NEGATIVE /UL
SODIUM SERPL-SCNC: 141 MMOL/L (ref 136–145)
SP GR UR STRIP.AUTO: 1.01
UROBILINOGEN UR STRIP.AUTO-MCNC: ABNORMAL MG/DL
WBC # BLD AUTO: 6 X10*3/UL (ref 4.4–11.3)

## 2024-05-24 PROCEDURE — 93005 ELECTROCARDIOGRAM TRACING: CPT | Performed by: NURSE PRACTITIONER

## 2024-05-24 PROCEDURE — 36415 COLL VENOUS BLD VENIPUNCTURE: CPT

## 2024-05-24 PROCEDURE — 85027 COMPLETE CBC AUTOMATED: CPT

## 2024-05-24 PROCEDURE — 80048 BASIC METABOLIC PNL TOTAL CA: CPT

## 2024-05-24 PROCEDURE — 81003 URINALYSIS AUTO W/O SCOPE: CPT

## 2024-05-24 ASSESSMENT — ENCOUNTER SYMPTOMS
PALPITATIONS: 0
ENDOCRINE NEGATIVE: 1
MYALGIAS: 1
RESPIRATORY NEGATIVE: 1
NECK NEGATIVE: 1
DYSPNEA AT REST: 0
ROS GI COMMENTS: RIGHT INGUINAL HERNIA
NEUROLOGICAL NEGATIVE: 1
CONSTITUTIONAL NEGATIVE: 1
DYSPNEA WITH EXERTION: 0
BRUISES/BLEEDS EASILY: 0

## 2024-05-24 NOTE — H&P (VIEW-ONLY)
[facility] Evaluation       Name: Yaniv Bearden (Yaniv Bearden)  /Age: 1945/78 y.o.         Date of Consult: 24    Referring Provider: Dr. Castle    Surgery, Date, and Length:     Circumcision, 24, 60 min       Yaniv Bearden is a 78 year-old male who presents to the [facility] for perioperative risk assessment prior to surgery.    Patient presents with a primary diagnosis of phimosis.  He is having issues retracting his foreskin. He has tried some cream in the past for this, but now it is not working.      This note was created in part upon personal review of patient's medical records.      Patient is scheduled to have a circumcision.      Pt denies any past history of anesthetic complications such as PONV, awareness, prolonged sedation, dental damage, aspiration, cardiac arrest, difficult intubation, difficult I.V. access or unexpected hospital admissions.  NO malignant hyperthermia and or pseudocholinesterase deficiency.  No history of blood transfusions     The patient is not a [employer] and will accept blood and blood products if medically indicated.       Past Medical History:   Diagnosis Date    Acquired spondylolisthesis     Arthritis     BPH with obstruction/lower urinary tract symptoms     Chronic constipation     Foreskin problem     Gait instability     H/O echocardiogram 2023    CONCLUSIONS: 1. Left ventricular systolic function is normal with a 55-60% estimated ejection fraction. 2. No left ventricular thrombus visualized. 3. RVSP within normal limits. 4. There is plaque visualized in the ascending aorta.    Hyperlipidemia     Hypertension     Overweight     Paget's bone disease     Parkinson disease (Multi)     Phimosis     PVD (peripheral vascular disease) (CMS-[facility])     Tremor     Venous insufficiency        Past Surgical History:   Procedure Laterality Date    PROSTATE SURGERY  2022       Family History   Problem Relation Name Age of Onset    No Known Problems  Mother      No Known Problems Father      Heart disease Sister       Social History     Socioeconomic History    Marital status:      Spouse name: Not on file    Number of children: Not on file    Years of education: Not on file    Highest education level: Not on file   Occupational History    Not on file   Tobacco Use    Smoking status: Never    Smokeless tobacco: Never   Vaping Use    Vaping status: Never Used   Substance and Sexual Activity    Alcohol use: Not Currently     Alcohol/week: 1.0 standard drink of alcohol     Types: 1 Standard drinks or equivalent per week    Drug use: Never    Sexual activity: Defer   Other Topics Concern    Not on file   Social History Narrative    Not on file     Social Determinants of Health     Financial Resource Strain: Not on file   Food Insecurity: Not on file   Transportation Needs: Not on file   Physical Activity: Not on file   Stress: Not on file   Social Connections: Not on file   Intimate Partner Violence: Not on file   Housing Stability: Not on file        No Known Allergies      Current Outpatient Medications:     carbidopa-levodopa (Sinemet)  mg tablet, Take 1 tablet by mouth 3 times a day. Take one tablet 3 times daily at 12 PM, 4 PM, 8 PM., Disp: 270 tablet, Rfl: 3    dorzolamide-timoloL (Cosopt) 22.3-6.8 mg/mL ophthalmic solution, Dorzolamide HCl-Timolol Mal 22.3-6.8 MG/ML Ophthalmic Solution  Quantity: 10  Refills: 0      Start : 2-Mar-2021  Active, Disp: , Rfl:     furosemide (Lasix) 40 mg tablet, Take 1 tablet (40 mg) by mouth once daily., Disp: 30 tablet, Rfl: 2    ibuprofen 200 mg tablet, Take 1 tablet (200 mg) by mouth every 6 hours if needed for mild pain (1 - 3)., Disp: , Rfl:     latanoprost (Xalatan) 0.005 % ophthalmic solution, Administer 1 drop into both eyes once daily at bedtime., Disp: , Rfl:     losartan (Cozaar) 50 mg tablet, Take 1 tablet (50 mg) by mouth once daily., Disp: 90 tablet, Rfl: 0    multivitamin tablet, Take 1 tablet by  mouth once daily., Disp: , Rfl:     simvastatin (Zocor) 40 mg tablet, Take 1 tablet (40 mg) by mouth once daily at bedtime., Disp: 60 tablet, Rfl: 1      PAT ROS:   Constitutional:   neg    Neuro/Psych:   neg    Eyes:    use of corrective lenses  Ears:    no hearing aides  Nose:   Mouth:    Missing teeth  Throat:   neg    Neck:   neg    Cardio:    Stationary bike, weight lifting, however patient has an right inguinal hernia and has not been able to exercise due to this. Functional 4 Mets. Patient denies SOB walking up 2 flights of stairs    no chest pain   no palpitations   no dyspnea   no BLACKBURN  Respiratory:   neg    Endocrine:   neg    GI:    Right inguinal hernia  :   neg    Musculoskeletal:    Generalized back pain     myalgias  Hematologic:    does not bruise/bleed easily   no history of blood transfusion   no blood clots  Skin:  neg      Physical Exam  Constitutional:       Appearance: He is normal weight.   HENT:      Head: Normocephalic and atraumatic.      Mouth/Throat:      Mouth: Mucous membranes are moist.      Pharynx: Oropharynx is clear.   Eyes:      Conjunctiva/sclera: Conjunctivae normal.   Cardiovascular:      Rate and Rhythm: Normal rate and regular rhythm.      Pulses: Normal pulses.      Heart sounds: Normal heart sounds.   Pulmonary:      Effort: Pulmonary effort is normal.      Breath sounds: Normal breath sounds.   Abdominal:      General: Bowel sounds are normal.      Palpations: Abdomen is soft.      Comments: Patient reports right sided inguinal hernia   Musculoskeletal:         General: Normal range of motion.      Cervical back: Normal range of motion.   Skin:     General: Skin is warm and dry.      Capillary Refill: Capillary refill takes less than 2 seconds.   Neurological:      General: No focal deficit present.      Mental Status: He is alert and oriented to person, place, and time.          PAT AIRWAY:   Airway:     Mallampati::  III    Neck ROM::  Full   No broken teeth, no  "dentures and missing teeth         Visit Vitals  /69   Pulse 56   Temp 36.3 °C (97.3 °F)   Resp 18   Ht 1.676 m (5' 6\")   Wt 78.1 kg (172 lb 2.9 oz)   SpO2 100%   BMI 27.79 kg/m²   Smoking Status Never   BSA 1.91 m²      Plan    Neuro:  Parkinson's Disease-Sinemet-to be taken the day of surgery.    Cardiovascular:  Patient denies any chest pain, tightness, heaviness, pressure, radiating pain, palpitations, irregular heartbeats, lightheadedness, cough, congestion, shortness of breath, BLACKBURN, PND, near syncope, weight loss or gain.     HLD:  Zocor- Patient to take dos    HTN:  Cozaar-Patient to stop one day prior to surgery  Lasix-Patient to take dos    EKG in [facility] on 05/26/24     Encounter Date: 05/24/24   ECG 12 Lead   Result Value    Ventricular Rate 57    Atrial Rate 57    AZ Interval 194    QRS Duration 92    QT Interval 392    QTC Calculation(Bazett) 381    P Axis 70    R Axis -13    T Axis 49    QRS Count 10    Q Onset 214    P Onset 117    P Offset 181    T Offset 410    QTC Fredericia 385    Narrative    Sinus bradycardia with occasional Premature ventricular complexes  Low voltage QRS  Borderline ECG  When compared with ECG of 14-MAR-2022 08:39,  Premature ventricular complexes are now Present      Echo: 02/24/23  CONCLUSIONS:  1. Left ventricular systolic function is normal with a 55-60% estimated ejection fraction.  2. No left ventricular thrombus visualized.  3. RVSP within normal limits.  4. There is plaque visualized in the ascending aorta.    RCRI: 0 Risk of Mace:3.9%    Pulm:  Denies any shortness of breath or activity intolerance.    Stop Bang= 3 (age,male, htn) intermediate      Heme:  Patient instructed to ambulate as soon as possible postoperatively to decrease thromboembolic risk.    Initiate mechanical DVT prophylaxis as soon as possible and initiate chemical prophylaxis when deemed safe from a bleeding standpoint post surgery.    Caprini= 6    Risk assessment complete.  Patient is scheduled " for a low-intermediate surgical risk procedure.         Labs/testing obtained in PAT on 05/24/24  CBC, BMP, UA  Lab Results   Component Value Date    WBC 6.0 05/24/2024    HGB 13.4 (L) 05/24/2024    HCT 40.4 (L) 05/24/2024    MCV 91 05/24/2024     05/24/2024      Lab Results   Component Value Date    GLUCOSE 82 05/24/2024    CALCIUM 9.6 05/24/2024     05/24/2024    K 4.1 05/24/2024    CO2 31 05/24/2024     05/24/2024    BUN 14 05/24/2024    CREATININE 1.07 05/24/2024      ntains abnormal data Urinalysis with Reflex Culture and Microscopic  Order: [id number]   Status: Final result       Visible to patient: Yes (not seen)       Dx: Phimosis; H/O of acquired phimosis in...    0 Result Notes        Specimen Collected: 05/24/24 16:51 Last Resulted: 05/25/24 03:01       Order Details        View Encounter        Lab and Collection Details        Routing        Result History     View All Conversations on this Encounter           Result Care Coordination      Patient Communication     Released  Not seen Back to Top         Extra Urine Gray Tube  Order: [id number] - Part of Panel Order [id number]   Status: Final result         Visible to patient: Yes (not seen)         Dx: Phimosis; H/O of acquired phimosis in...      0 Result Notes          Component 2 d ago   Extra Tube Hold for add-ons.   Comment: Auto resulted.   Resulting Agency [facility]              Specimen Collected: 05/24/24 16:51 Last Resulted: 05/25/24 03:01        Lab Flowsheet          Order Details          View Encounter          Lab and Collection Details          Routing          Result History       View All Conversations on this Encounter           Result Care Coordination      Patient Communication     Released  Not seen Back to Top          Contains abnormal data Urinalysis with Reflex Culture and Microscopic  Order: [id number] - Part of Panel Order [id number]   Status: Final result         Visible to patient: Yes (not seen)         Dx:  Phimosis; H/O of acquired phimosis in...      0 Result Notes               Component  Ref Range & Units 2 d ago  (5/24/24) 2 yr ago  (5/14/22) 2 yr ago  (4/29/22) 2 yr ago  (3/14/22) 2 yr ago  (2/13/22) 3 yr ago  (4/12/21)   Color, Urine  Light-Yellow, Yellow, Dark-Yellow Yellow YELLOW R YELLOW R YELLOW R YELLOW R YELLOW R   Appearance, Urine  Clear Clear CLEAR R CLEAR R HAZY R HAZY R CLEAR R   Specific Gravity, Urine  1.005 - 1.035 1.015 1.012 1.012 1.011 1.012 1.010   pH, Urine  5.0, 5.5, 6.0, 6.5, 7.0, 7.5, 8.0 6.5 6.0 R 6.0 R 7.0 R 5.0 R 6.0 R   Protein, Urine  NEGATIVE, 10 (TRACE), 20 (TRACE) mg/dL NEGATIVE 30 (1+) Abnormal  R NEGATIVE R NEGATIVE R 100(2+) Abnormal  R NEGATIVE R   Glucose, Urine  Normal mg/dL Normal NEGATIVE R NEGATIVE R NEGATIVE R NEGATIVE R NEGATIVE R   Blood, Urine  NEGATIVE NEGATIVE LARGE (3+) Abnormal  LARGE (3+) Abnormal  NEGATIVE SMALL(1+) Abnormal  NEGATIVE   Ketones, Urine  NEGATIVE mg/dL NEGATIVE NEGATIVE NEGATIVE 20(1+) Abnormal  20(1+) Abnormal  NEGATIVE   Bilirubin, Urine  NEGATIVE NEGATIVE NEGATIVE NEGATIVE NEGATIVE NEGATIVE NEGATIVE   Urobilinogen, Urine  Normal mg/dL 4 (2+) Abnormal  4.0 High  R, CM <2.0 R 2.0 High  R, CM <2.0 R 2.0 High  R, CM   Comment: Some pigments and medications may cause a false positive urobilinogen.   Nitrite, Urine  NEGATIVE NEGATIVE NEGATIVE NEGATIVE NEGATIVE NEGATIVE NEGATIVE   Leukocyte Esterase, Urine  NEGATIVE NEGATIVE NEGATIVE LARGE (3+) Abnormal  LARGE(3+) Abnormal  TRACE Abnormal  NEGATIVE   Resulting Agency [facility]              Specimen Collected: 05/24/24 16:51 Last Resulted: 05/24/24 17:27           Labs reviewed unremarkable.    Preoperative medication instructions were provided and reviewed with the patient.  Any additional testing or evaluation was explained to the patient.  Nothing by mouth instructions were discussed and patient's questions were answered prior to conclusion to this  encounter.  Patient verbalized understanding of preoperative instructions given in preadmission testing; discharge instructions available in EMR.    This note was dictated with speech recognition.  Minor errors may have been detected during use of speech recognition.

## 2024-05-24 NOTE — PREPROCEDURE INSTRUCTIONS
Medication List            Accurate as of May 24, 2024  2:19 PM. Always use your most recent med list.                carbidopa-levodopa  mg tablet  Commonly known as: Sinemet  Take 1 tablet by mouth 3 times a day. Take one tablet 3 times daily at 12 PM, 4 PM, 8 PM.  Notes to patient: Take as directed     dorzolamide-timoloL 22.3-6.8 mg/mL ophthalmic solution  Commonly known as: Cosopt  Notes to patient: May use day of surgery.     furosemide 40 mg tablet  Commonly known as: Lasix  Take 1 tablet (40 mg) by mouth once daily.  Medication Adjustments for Surgery: Take morning of surgery with sip of water, no other fluids     ibuprofen 200 mg tablet  Medication Adjustments for Surgery: Stop 7 days before surgery     latanoprost 0.005 % ophthalmic solution  Commonly known as: Xalatan  Notes to patient: May use the morning of surgery     losartan 50 mg tablet  Commonly known as: Cozaar  Take 1 tablet (50 mg) by mouth once daily.  Medication Adjustments for Surgery: Stop 1 day before surgery     multivitamin tablet  Medication Adjustments for Surgery: Stop 7 days before surgery     simvastatin 40 mg tablet  Commonly known as: Zocor  Take 1 tablet (40 mg) by mouth once daily at bedtime.  Notes to patient: May take the evening before surgery                        **Concerning above medication instructions, if medication is normally taken at night, continue normal schedule.**  **DO NOT TAKE NIGHT PRIOR AND MORNING OF SURGERY**    CONTACT SURGEON'S OFFICE IF YOU DEVELOP:  * Fever = 100.4 F   * New respiratory symptoms (e.g. cough, shortness of breath, respiratory distress, sore throat)  * Recent loss of taste or smell  *Flu like symptoms such as headache, fatigue or gastrointestinal symptoms  * You develop any open sores, shingles, burning or painful urination   AND/OR:  * You no longer wish to have the surgery.  * Any other personal circumstances change that may lead to the need to cancel or defer this  surgery.  *You were admitted to any hospital within one week of your planned procedure.    SMOKING:  *Quitting smoking can make a huge difference to your health and recovery from surgery.    *If you need help with quitting, call 1-183-QUIT-NOW.    THE DAY BEFORE SURGERY:   Nothing by mouth (no solids or liquids) after midnight.   If diabetic, please check fasting blood sugar upon waking up.    If fasting sugar is <80 mg/dl, please drink 100ml/3oz of apple juice no later than 2 hours prior to arrival time.      SURGICAL TIME  *You will be contacted between 2 p.m. and 6 p.m. the business day before your surgery with your arrival time.  *If you haven't received a call by 6pm, call [phone].  *Scheduled surgery times may change and you will be notified if this occurs-check your personal voicemail for any updates.    ON THE MORNING OF SURGERY:  *Wear comfortable, loose fitting clothing.   *Do not use moisturizers, creams, lotions or perfume.  *All jewelry and valuables should be left at home.  *Prosthetic devices such as contact lenses, hearing aids, dentures, eyelash extensions, hairpins and body piercing must be removed before surgery.    BRING WITH YOU:  *Photo ID and insurance card  *Current list of medicines and allergies  *Pacemaker/Defibrillator/Heart stent cards  *CPAP machine and mask  *Slings/splints/crutches  *Copy of your complete Advanced Directive/DHPOA-if applicable  *Neurostimulator implant remote    PARKING AND ARRIVAL:  *Check in at the Main Entrance desk and let them know you are here for surgery.  *You will be directed to the 2nd floor surgical waiting area.    AFTER OUTPATIENT SURGERY:  *A responsible adult MUST accompany you at the time of discharge and stay with you for 24 hours after your surgery.  *You may NOT drive yourself home after surgery.  *You may use a taxi or ride sharing service ([employer], Uber) to return home ONLY if you are accompanied by a friend or family member.  *Instructions for  resuming your medications will be provided by your surgeon.

## 2024-05-24 NOTE — CPM/PAT H&P
[facility] Evaluation       Name: Yaniv Bearden (Yaniv Bearden)  /Age: 1945/78 y.o.         Date of Consult: 24     Referring Provider: Dr. Castle    Surgery, Date, and Length: Circumcision, 24, 60 min    Yaniv Bearden is an 78 year-old male who presents to the [facility] for perioperative risk assessment prior to surgery.    Patient presents with a primary diagnosis of Phimosis.     This note was created in part upon personal review of patient's medical records.      Patient is scheduled to have a circumcision.       Pt denies any past history of anesthetic complications such as PONV, awareness, prolonged sedation, dental damage, aspiration, cardiac arrest, difficult intubation, difficult I.V. access or unexpected hospital admissions.  NO malignant hyperthermia and or pseudocholinesterase deficiency.  No history of blood transfusions     The patient is not a Christian and will accept blood and blood products if medically indicated.   Type and screen sent.     Past Medical History:   Diagnosis Date    Acquired spondylolisthesis     Arthritis     BPH with obstruction/lower urinary tract symptoms     Chronic constipation     Foreskin problem     Gait instability     H/O echocardiogram 2023    CONCLUSIONS: 1. Left ventricular systolic function is normal with a 55-60% estimated ejection fraction. 2. No left ventricular thrombus visualized. 3. RVSP within normal limits. 4. There is plaque visualized in the ascending aorta.    Hyperlipidemia     Hypertension     Overweight     Paget's bone disease     Parkinson disease (Multi)     Phimosis     PVD (peripheral vascular disease) (CMS-[facility])     Tremor     Venous insufficiency        Past Surgical History:   Procedure Laterality Date    PROSTATE SURGERY  2022         Family History   Problem Relation Name Age of Onset    No Known Problems Mother      No Known Problems Father      Heart disease Sister         No Known Allergies    Prior to  Admission medications    Medication Sig Start Date End Date Taking? Authorizing Provider   carbidopa-levodopa (Sinemet)  mg tablet Take 1 tablet by mouth 3 times a day. Take one tablet 3 times daily at 12 PM, 4 PM, 8 PM. 2/22/24 2/21/25  Iain Ramos MD   dorzolamide-timoloL (Cosopt) 22.3-6.8 mg/mL ophthalmic solution Dorzolamide HCl-Timolol Mal 22.3-6.8 MG/ML Ophthalmic Solution   Quantity: 10  Refills: 0        Start : 2-Mar-2021   Active 3/2/21   Historical Provider, MD   furosemide (Lasix) 40 mg tablet Take 1 tablet (40 mg) by mouth once daily. 3/8/24 6/6/24  Osvaldo Stover MD   ibuprofen 200 mg tablet Take 1 tablet (200 mg) by mouth every 6 hours if needed for mild pain (1 - 3).    Historical Provider, MD   latanoprost (Xalatan) 0.005 % ophthalmic solution Administer 1 drop into both eyes at bedtime. 10/24/19   Historical Provider, MD   losartan (Cozaar) 50 mg tablet Take 1 tablet (50 mg) by mouth once daily. 2/14/24   Osvaldo Stover MD   multivitamin tablet Take 1 tablet by mouth once daily.    Historical Provider, MD   simvastatin (Zocor) 40 mg tablet Take 1 tablet (40 mg) by mouth once daily at bedtime. 2/14/24 6/13/24  Osvaldo Stover MD        PAT ROS     PAT Physical Exam     Airway      Plan    Neuro:    Cardiovascular:    .dcv    functional capacity    EKG in PAT on        RCRI:  Risk of Mace:      Pulm:  .radhames    Stop Bang=     Renal/endo:  .ckd    GI/:    Heme:  Patient instructed to ambulate as soon as possible postoperatively to decrease thromboembolic risk.    Initiate mechanical DVT prophylaxis as soon as possible and initiate chemical prophylaxis when deemed safe from a bleeding standpoint post surgery.    Caprini=        Risk assessment complete.  Patient is scheduled for a low/intermediate/high surgical risk procedure.   is/is not considered medically optimized for the planned procedure.        Labs/testing obtained in PAT on     Follow up:      Communication:    Preoperative  medication instructions were provided and reviewed with the patient.  Any additional testing or evaluation was explained to the patient.  Nothing by mouth instructions were discussed and patient's questions were answered prior to conclusion to this encounter.  Patient verbalized understanding of preoperative instructions given in preadmission testing; discharge instructions available in EMR.    This note was dictated with speech recognition.  Minor errors may have been detected during use of speech recognition.

## 2024-05-24 NOTE — CPM/PAT H&P
[facility] Evaluation       Name: Yaniv Bearden (Yaniv Bearden)  /Age: 1945/78 y.o.         Date of Consult: 24    Referring Provider: Dr. Castle    Surgery, Date, and Length:     Circumcision, 24, 60 min       Yaniv Bearden is a 78 year-old male who presents to the [facility] for perioperative risk assessment prior to surgery.    Patient presents with a primary diagnosis of phimosis.  He is having issues retracting his foreskin. He has tried some cream in the past for this, but now it is not working.      This note was created in part upon personal review of patient's medical records.      Patient is scheduled to have a circumcision.      Pt denies any past history of anesthetic complications such as PONV, awareness, prolonged sedation, dental damage, aspiration, cardiac arrest, difficult intubation, difficult I.V. access or unexpected hospital admissions.  NO malignant hyperthermia and or pseudocholinesterase deficiency.  No history of blood transfusions     The patient is not a [facility] and will accept blood and blood products if medically indicated.       Past Medical History:   Diagnosis Date    Acquired spondylolisthesis     Arthritis     BPH with obstruction/lower urinary tract symptoms     Chronic constipation     Foreskin problem     Gait instability     H/O echocardiogram 2023    CONCLUSIONS: 1. Left ventricular systolic function is normal with a 55-60% estimated ejection fraction. 2. No left ventricular thrombus visualized. 3. RVSP within normal limits. 4. There is plaque visualized in the ascending aorta.    Hyperlipidemia     Hypertension     Overweight     Paget's bone disease     Parkinson disease (Multi)     Phimosis     PVD (peripheral vascular disease) (CMS-[facility])     Tremor     Venous insufficiency        Past Surgical History:   Procedure Laterality Date    PROSTATE SURGERY  2022       Family History   Problem Relation Name Age of Onset    No Known Problems  Mother      No Known Problems Father      Heart disease Sister       Social History     Socioeconomic History    Marital status:      Spouse name: Not on file    Number of children: Not on file    Years of education: Not on file    Highest education level: Not on file   Occupational History    Not on file   Tobacco Use    Smoking status: Never    Smokeless tobacco: Never   Vaping Use    Vaping status: Never Used   Substance and Sexual Activity    Alcohol use: Not Currently     Alcohol/week: 1.0 standard drink of alcohol     Types: 1 Standard drinks or equivalent per week    Drug use: Never    Sexual activity: Defer   Other Topics Concern    Not on file   Social History Narrative    Not on file     Social Determinants of Health     Financial Resource Strain: Not on file   Food Insecurity: Not on file   Transportation Needs: Not on file   Physical Activity: Not on file   Stress: Not on file   Social Connections: Not on file   Intimate Partner Violence: Not on file   Housing Stability: Not on file        No Known Allergies      Current Outpatient Medications:     carbidopa-levodopa (Sinemet)  mg tablet, Take 1 tablet by mouth 3 times a day. Take one tablet 3 times daily at 12 PM, 4 PM, 8 PM., Disp: 270 tablet, Rfl: 3    dorzolamide-timoloL (Cosopt) 22.3-6.8 mg/mL ophthalmic solution, Dorzolamide HCl-Timolol Mal 22.3-6.8 MG/ML Ophthalmic Solution  Quantity: 10  Refills: 0      Start : 2-Mar-2021  Active, Disp: , Rfl:     furosemide (Lasix) 40 mg tablet, Take 1 tablet (40 mg) by mouth once daily., Disp: 30 tablet, Rfl: 2    ibuprofen 200 mg tablet, Take 1 tablet (200 mg) by mouth every 6 hours if needed for mild pain (1 - 3)., Disp: , Rfl:     latanoprost (Xalatan) 0.005 % ophthalmic solution, Administer 1 drop into both eyes once daily at bedtime., Disp: , Rfl:     losartan (Cozaar) 50 mg tablet, Take 1 tablet (50 mg) by mouth once daily., Disp: 90 tablet, Rfl: 0    multivitamin tablet, Take 1 tablet by  mouth once daily., Disp: , Rfl:     simvastatin (Zocor) 40 mg tablet, Take 1 tablet (40 mg) by mouth once daily at bedtime., Disp: 60 tablet, Rfl: 1      PAT ROS:   Constitutional:   neg    Neuro/Psych:   neg    Eyes:    use of corrective lenses  Ears:    no hearing aides  Nose:   Mouth:    Missing teeth  Throat:   neg    Neck:   neg    Cardio:    Stationary bike, weight lifting, however patient has an right inguinal hernia and has not been able to exercise due to this. Functional 4 Mets. Patient denies SOB walking up 2 flights of stairs    no chest pain   no palpitations   no dyspnea   no BLACKBURN  Respiratory:   neg    Endocrine:   neg    GI:    Right inguinal hernia  :   neg    Musculoskeletal:    Generalized back pain     myalgias  Hematologic:    does not bruise/bleed easily   no history of blood transfusion   no blood clots  Skin:  neg      Physical Exam  Constitutional:       Appearance: He is normal weight.   HENT:      Head: Normocephalic and atraumatic.      Mouth/Throat:      Mouth: Mucous membranes are moist.      Pharynx: Oropharynx is clear.   Eyes:      Conjunctiva/sclera: Conjunctivae normal.   Cardiovascular:      Rate and Rhythm: Normal rate and regular rhythm.      Pulses: Normal pulses.      Heart sounds: Normal heart sounds.   Pulmonary:      Effort: Pulmonary effort is normal.      Breath sounds: Normal breath sounds.   Abdominal:      General: Bowel sounds are normal.      Palpations: Abdomen is soft.      Comments: Patient reports right sided inguinal hernia   Musculoskeletal:         General: Normal range of motion.      Cervical back: Normal range of motion.   Skin:     General: Skin is warm and dry.      Capillary Refill: Capillary refill takes less than 2 seconds.   Neurological:      General: No focal deficit present.      Mental Status: He is alert and oriented to person, place, and time.          PAT AIRWAY:   Airway:     Mallampati::  III    Neck ROM::  Full   No broken teeth, no  "dentures and missing teeth         Visit Vitals  /69   Pulse 56   Temp 36.3 °C (97.3 °F)   Resp 18   Ht 1.676 m (5' 6\")   Wt 78.1 kg (172 lb 2.9 oz)   SpO2 100%   BMI 27.79 kg/m²   Smoking Status Never   BSA 1.91 m²      Plan    Neuro:  Parkinson's Disease-Sinemet-to be taken the day of surgery.    Cardiovascular:  Patient denies any chest pain, tightness, heaviness, pressure, radiating pain, palpitations, irregular heartbeats, lightheadedness, cough, congestion, shortness of breath, BLACKBURN, PND, near syncope, weight loss or gain.     HLD:  Zocor- Patient to take dos    HTN:  Cozaar-Patient to stop one day prior to surgery  Lasix-Patient to take dos    E[facility] in [facility] on 05/26/24     Encounter Date: 05/24/24   ECG 12 Lead   Result Value    Ventricular Rate 57    Atrial Rate 57    NV Interval 194    QRS Duration 92    QT Interval 392    QTC Calculation(Bazett) 381    P Axis 70    R Axis -13    T Axis 49    QRS Count 10    Q Onset 214    P Onset 117    P Offset 181    T Offset 410    QTC Fredericia 385    Narrative    Sinus bradycardia with occasional Premature ventricular complexes  Low voltage QRS  Borderline ECG  When compared with ECG of 14-MAR-2022 08:39,  Premature ventricular complexes are now Present      Echo: 02/24/23  CONCLUSIONS:  1. Left ventricular systolic function is normal with a 55-60% estimated ejection fraction.  2. No left ventricular thrombus visualized.  3. RVSP within normal limits.  4. There is plaque visualized in the ascending aorta.    RCRI: 0 Risk of Mace:3.9%    Pulm:  Denies any shortness of breath or activity intolerance.    Stop Bang= 3 (age,male, htn) intermediate      Heme:  Patient instructed to ambulate as soon as possible postoperatively to decrease thromboembolic risk.    Initiate mechanical DVT prophylaxis as soon as possible and initiate chemical prophylaxis when deemed safe from a bleeding standpoint post surgery.    Caprini= 6    Risk assessment complete.  Patient is scheduled " for a low-intermediate surgical risk procedure.         Labs/testing obtained in PAT on 05/24/24  CBC, BMP, UA  Lab Results   Component Value Date    WBC 6.0 05/24/2024    HGB 13.4 (L) 05/24/2024    HCT 40.4 (L) 05/24/2024    MCV 91 05/24/2024     05/24/2024      Lab Results   Component Value Date    GLUCOSE 82 05/24/2024    CALCIUM 9.6 05/24/2024     05/24/2024    K 4.1 05/24/2024    CO2 31 05/24/2024     05/24/2024    BUN 14 05/24/2024    CREATININE 1.07 05/24/2024      ntains abnormal data Urinalysis with Reflex Culture and Microscopic  Order: [id number]   Status: Final result       Visible to patient: Yes (not seen)       Dx: Phimosis; H/O of acquired phimosis in...    0 Result Notes        Specimen Collected: 05/24/24 16:51 Last Resulted: 05/25/24 03:01       Order Details        View Encounter        Lab and Collection Details        Routing        Result History     View All Conversations on this Encounter           Result Care Coordination      Patient Communication     Released  Not seen Back to Top         Extra Urine Gray Tube  Order: [id number] - Part of Panel Order [id number]   Status: Final result         Visible to patient: Yes (not seen)         Dx: Phimosis; H/O of acquired phimosis in...      0 Result Notes          Component 2 d ago   Extra Tube Hold for add-ons.   Comment: Auto resulted.   Resulting Agency [facility]              Specimen Collected: 05/24/24 16:51 Last Resulted: 05/25/24 03:01        Lab Flowsheet          Order Details          View Encounter          Lab and Collection Details          Routing          Result History       View All Conversations on this Encounter           Result Care Coordination      Patient Communication     Released  Not seen Back to Top          Contains abnormal data Urinalysis with Reflex Culture and Microscopic  Order: [id number] - Part of Panel Order [id number]   Status: Final result         Visible to patient: Yes (not seen)         Dx:  Phimosis; H/O of acquired phimosis in...      0 Result Notes               Component  Ref Range & Units 2 d ago  (5/24/24) 2 yr ago  (5/14/22) 2 yr ago  (4/29/22) 2 yr ago  (3/14/22) 2 yr ago  (2/13/22) 3 yr ago  (4/12/21)   Color, Urine  Light-Yellow, Yellow, Dark-Yellow Yellow YELLOW R YELLOW R YELLOW R YELLOW R YELLOW R   Appearance, Urine  Clear Clear CLEAR R CLEAR R HAZY R HAZY R CLEAR R   Specific Gravity, Urine  1.005 - 1.035 1.015 1.012 1.012 1.011 1.012 1.010   pH, Urine  5.0, 5.5, 6.0, 6.5, 7.0, 7.5, 8.0 6.5 6.0 R 6.0 R 7.0 R 5.0 R 6.0 R   Protein, Urine  NEGATIVE, 10 (TRACE), 20 (TRACE) mg/dL NEGATIVE 30 (1+) Abnormal  R NEGATIVE R NEGATIVE R 100(2+) Abnormal  R NEGATIVE R   Glucose, Urine  Normal mg/dL Normal NEGATIVE R NEGATIVE R NEGATIVE R NEGATIVE R NEGATIVE R   Blood, Urine  NEGATIVE NEGATIVE LARGE (3+) Abnormal  LARGE (3+) Abnormal  NEGATIVE SMALL(1+) Abnormal  NEGATIVE   Ketones, Urine  NEGATIVE mg/dL NEGATIVE NEGATIVE NEGATIVE 20(1+) Abnormal  20(1+) Abnormal  NEGATIVE   Bilirubin, Urine  NEGATIVE NEGATIVE NEGATIVE NEGATIVE NEGATIVE NEGATIVE NEGATIVE   Urobilinogen, Urine  Normal mg/dL 4 (2+) Abnormal  4.0 High  R, CM <2.0 R 2.0 High  R, CM <2.0 R 2.0 High  R, CM   Comment: Some pigments and medications may cause a false positive urobilinogen.   Nitrite, Urine  NEGATIVE NEGATIVE NEGATIVE NEGATIVE NEGATIVE NEGATIVE NEGATIVE   Leukocyte Esterase, Urine  NEGATIVE NEGATIVE NEGATIVE LARGE (3+) Abnormal  LARGE(3+) Abnormal  TRACE Abnormal  NEGATIVE   Resulting Agency [facility]              Specimen Collected: 05/24/24 16:51 Last Resulted: 05/24/24 17:27           Labs reviewed unremarkable.    Preoperative medication instructions were provided and reviewed with the patient.  Any additional testing or evaluation was explained to the patient.  Nothing by mouth instructions were discussed and patient's questions were answered prior to conclusion to this  encounter.  Patient verbalized understanding of preoperative instructions given in preadmission testing; discharge instructions available in EMR.    This note was dictated with speech recognition.  Minor errors may have been detected during use of speech recognition.

## 2024-05-25 LAB — HOLD SPECIMEN: NORMAL

## 2024-05-28 LAB
ATRIAL RATE: 57 BPM
P AXIS: 70 DEGREES
P OFFSET: 181 MS
P ONSET: 117 MS
PR INTERVAL: 194 MS
Q ONSET: 214 MS
QRS COUNT: 10 BEATS
QRS DURATION: 92 MS
QT INTERVAL: 392 MS
QTC CALCULATION(BAZETT): 381 MS
QTC FREDERICIA: 385 MS
R AXIS: -13 DEGREES
T AXIS: 49 DEGREES
T OFFSET: 410 MS
VENTRICULAR RATE: 57 BPM

## 2024-05-30 ENCOUNTER — APPOINTMENT (OUTPATIENT)
Dept: OPHTHALMOLOGY | Facility: CLINIC | Age: 79
End: 2024-05-30
Payer: MEDICARE

## 2024-06-06 ENCOUNTER — ANESTHESIA EVENT (OUTPATIENT)
Dept: OPERATING ROOM | Facility: HOSPITAL | Age: 79
End: 2024-06-06
Payer: MEDICARE

## 2024-06-07 ENCOUNTER — ANESTHESIA (OUTPATIENT)
Dept: OPERATING ROOM | Facility: HOSPITAL | Age: 79
End: 2024-06-07
Payer: MEDICARE

## 2024-06-07 ENCOUNTER — HOSPITAL ENCOUNTER (OUTPATIENT)
Facility: HOSPITAL | Age: 79
Setting detail: OUTPATIENT SURGERY
Discharge: HOME | End: 2024-06-07
Attending: UROLOGY | Admitting: UROLOGY
Payer: MEDICARE

## 2024-06-07 VITALS
HEIGHT: 66 IN | SYSTOLIC BLOOD PRESSURE: 117 MMHG | OXYGEN SATURATION: 95 % | TEMPERATURE: 96.8 F | BODY MASS INDEX: 26.71 KG/M2 | WEIGHT: 166.23 LBS | RESPIRATION RATE: 16 BRPM | DIASTOLIC BLOOD PRESSURE: 74 MMHG | HEART RATE: 63 BPM

## 2024-06-07 DIAGNOSIS — N47.8 FORESKIN PROBLEM: ICD-10-CM

## 2024-06-07 DIAGNOSIS — N47.1 PHIMOSIS: Primary | ICD-10-CM

## 2024-06-07 DIAGNOSIS — G89.18 ACUTE POST-OPERATIVE PAIN: ICD-10-CM

## 2024-06-07 PROCEDURE — 3600000007 HC OR TIME - EACH INCREMENTAL 1 MINUTE - PROCEDURE LEVEL TWO: Performed by: UROLOGY

## 2024-06-07 PROCEDURE — 2720000007 HC OR 272 NO HCPCS: Performed by: UROLOGY

## 2024-06-07 PROCEDURE — 3700000002 HC GENERAL ANESTHESIA TIME - EACH INCREMENTAL 1 MINUTE: Performed by: UROLOGY

## 2024-06-07 PROCEDURE — 3700000001 HC GENERAL ANESTHESIA TIME - INITIAL BASE CHARGE: Performed by: UROLOGY

## 2024-06-07 PROCEDURE — 2500000005 HC RX 250 GENERAL PHARMACY W/O HCPCS: Performed by: ANESTHESIOLOGIST ASSISTANT

## 2024-06-07 PROCEDURE — 88304 TISSUE EXAM BY PATHOLOGIST: CPT | Mod: TC,AHULAB,PARLAB | Performed by: UROLOGY

## 2024-06-07 PROCEDURE — 2500000004 HC RX 250 GENERAL PHARMACY W/ HCPCS (ALT 636 FOR OP/ED): Performed by: ANESTHESIOLOGIST ASSISTANT

## 2024-06-07 PROCEDURE — 88304 TISSUE EXAM BY PATHOLOGIST: CPT | Performed by: PATHOLOGY

## 2024-06-07 PROCEDURE — 7100000002 HC RECOVERY ROOM TIME - EACH INCREMENTAL 1 MINUTE: Performed by: UROLOGY

## 2024-06-07 PROCEDURE — 7100000009 HC PHASE TWO TIME - INITIAL BASE CHARGE: Performed by: UROLOGY

## 2024-06-07 PROCEDURE — 2500000004 HC RX 250 GENERAL PHARMACY W/ HCPCS (ALT 636 FOR OP/ED): Performed by: ANESTHESIOLOGY

## 2024-06-07 PROCEDURE — 7100000010 HC PHASE TWO TIME - EACH INCREMENTAL 1 MINUTE: Performed by: UROLOGY

## 2024-06-07 PROCEDURE — 2500000005 HC RX 250 GENERAL PHARMACY W/O HCPCS: Performed by: ANESTHESIOLOGY

## 2024-06-07 PROCEDURE — 7100000001 HC RECOVERY ROOM TIME - INITIAL BASE CHARGE: Performed by: UROLOGY

## 2024-06-07 PROCEDURE — 2500000005 HC RX 250 GENERAL PHARMACY W/O HCPCS: Performed by: UROLOGY

## 2024-06-07 PROCEDURE — 54161 CIRCUM 28 DAYS OR OLDER: CPT | Performed by: UROLOGY

## 2024-06-07 PROCEDURE — 3600000002 HC OR TIME - INITIAL BASE CHARGE - PROCEDURE LEVEL TWO: Performed by: UROLOGY

## 2024-06-07 RX ORDER — ACETAMINOPHEN 325 MG/1
650 TABLET ORAL EVERY 6 HOURS PRN
Qty: 30 TABLET | Refills: 0 | Status: SHIPPED | OUTPATIENT
Start: 2024-06-07

## 2024-06-07 RX ORDER — OXYCODONE HYDROCHLORIDE 5 MG/1
5 TABLET ORAL EVERY 6 HOURS PRN
Qty: 5 TABLET | Refills: 0 | Status: SHIPPED | OUTPATIENT
Start: 2024-06-07

## 2024-06-07 RX ORDER — SODIUM CHLORIDE, SODIUM LACTATE, POTASSIUM CHLORIDE, CALCIUM CHLORIDE 600; 310; 30; 20 MG/100ML; MG/100ML; MG/100ML; MG/100ML
100 INJECTION, SOLUTION INTRAVENOUS CONTINUOUS
Status: DISCONTINUED | OUTPATIENT
Start: 2024-06-07 | End: 2024-06-07 | Stop reason: HOSPADM

## 2024-06-07 RX ORDER — CEFAZOLIN 1 G/1
INJECTION, POWDER, FOR SOLUTION INTRAVENOUS AS NEEDED
Status: DISCONTINUED | OUTPATIENT
Start: 2024-06-07 | End: 2024-06-07

## 2024-06-07 RX ORDER — IBUPROFEN 600 MG/1
600 TABLET ORAL EVERY 6 HOURS PRN
Qty: 30 TABLET | Refills: 0 | Status: SHIPPED | OUTPATIENT
Start: 2024-06-07

## 2024-06-07 RX ORDER — ONDANSETRON HYDROCHLORIDE 2 MG/ML
4 INJECTION, SOLUTION INTRAVENOUS ONCE AS NEEDED
Status: DISCONTINUED | OUTPATIENT
Start: 2024-06-07 | End: 2024-06-07 | Stop reason: HOSPADM

## 2024-06-07 RX ORDER — FENTANYL CITRATE 50 UG/ML
INJECTION, SOLUTION INTRAMUSCULAR; INTRAVENOUS AS NEEDED
Status: DISCONTINUED | OUTPATIENT
Start: 2024-06-07 | End: 2024-06-07

## 2024-06-07 RX ORDER — CEFAZOLIN SODIUM 2 G/100ML
2 INJECTION, SOLUTION INTRAVENOUS ONCE
Status: DISCONTINUED | OUTPATIENT
Start: 2024-06-07 | End: 2024-06-07 | Stop reason: HOSPADM

## 2024-06-07 RX ORDER — PROPOFOL 10 MG/ML
INJECTION, EMULSION INTRAVENOUS AS NEEDED
Status: DISCONTINUED | OUTPATIENT
Start: 2024-06-07 | End: 2024-06-07

## 2024-06-07 RX ORDER — DIPHENHYDRAMINE HYDROCHLORIDE 50 MG/ML
12.5 INJECTION INTRAMUSCULAR; INTRAVENOUS ONCE AS NEEDED
Status: DISCONTINUED | OUTPATIENT
Start: 2024-06-07 | End: 2024-06-07 | Stop reason: HOSPADM

## 2024-06-07 RX ORDER — LIDOCAINE HYDROCHLORIDE 10 MG/ML
INJECTION, SOLUTION EPIDURAL; INFILTRATION; INTRACAUDAL; PERINEURAL AS NEEDED
Status: DISCONTINUED | OUTPATIENT
Start: 2024-06-07 | End: 2024-06-07

## 2024-06-07 RX ORDER — ALBUTEROL SULFATE 0.83 MG/ML
2.5 SOLUTION RESPIRATORY (INHALATION) ONCE AS NEEDED
Status: DISCONTINUED | OUTPATIENT
Start: 2024-06-07 | End: 2024-06-07 | Stop reason: HOSPADM

## 2024-06-07 RX ORDER — BACITRACIN ZINC 500 UNIT/G
OINTMENT (GRAM) TOPICAL 2 TIMES DAILY
Qty: 28 G | Refills: 0 | Status: SHIPPED | OUTPATIENT
Start: 2024-06-07

## 2024-06-07 RX ORDER — ESMOLOL HYDROCHLORIDE 10 MG/ML
INJECTION INTRAVENOUS AS NEEDED
Status: DISCONTINUED | OUTPATIENT
Start: 2024-06-07 | End: 2024-06-07

## 2024-06-07 RX ORDER — ROCURONIUM BROMIDE 10 MG/ML
INJECTION, SOLUTION INTRAVENOUS AS NEEDED
Status: DISCONTINUED | OUTPATIENT
Start: 2024-06-07 | End: 2024-06-07

## 2024-06-07 RX ORDER — ACETAMINOPHEN 325 MG/1
975 TABLET ORAL ONCE
Status: COMPLETED | OUTPATIENT
Start: 2024-06-07 | End: 2024-06-07

## 2024-06-07 RX ORDER — OXYCODONE HYDROCHLORIDE 5 MG/1
5 TABLET ORAL EVERY 4 HOURS PRN
Status: DISCONTINUED | OUTPATIENT
Start: 2024-06-07 | End: 2024-06-07 | Stop reason: HOSPADM

## 2024-06-07 RX ORDER — HYDRALAZINE HYDROCHLORIDE 20 MG/ML
5 INJECTION INTRAMUSCULAR; INTRAVENOUS EVERY 30 MIN PRN
Status: DISCONTINUED | OUTPATIENT
Start: 2024-06-07 | End: 2024-06-07 | Stop reason: HOSPADM

## 2024-06-07 RX ORDER — ONDANSETRON HYDROCHLORIDE 2 MG/ML
INJECTION, SOLUTION INTRAVENOUS AS NEEDED
Status: DISCONTINUED | OUTPATIENT
Start: 2024-06-07 | End: 2024-06-07

## 2024-06-07 RX ADMIN — SODIUM CHLORIDE, POTASSIUM CHLORIDE, SODIUM LACTATE AND CALCIUM CHLORIDE 100 ML/HR: 600; 310; 30; 20 INJECTION, SOLUTION INTRAVENOUS at 10:18

## 2024-06-07 RX ADMIN — FENTANYL CITRATE 100 MCG: 50 INJECTION, SOLUTION INTRAMUSCULAR; INTRAVENOUS at 11:25

## 2024-06-07 RX ADMIN — Medication 6 L/MIN: at 12:14

## 2024-06-07 RX ADMIN — ESMOLOL HYDROCHLORIDE 30 MG: 10 INJECTION, SOLUTION INTRAVENOUS at 11:35

## 2024-06-07 RX ADMIN — LIDOCAINE HYDROCHLORIDE 50 ML: 10 INJECTION, SOLUTION EPIDURAL; INFILTRATION; INTRACAUDAL; PERINEURAL at 11:25

## 2024-06-07 RX ADMIN — CEFAZOLIN 2 G: 1 INJECTION, POWDER, FOR SOLUTION INTRAMUSCULAR; INTRAVENOUS at 11:30

## 2024-06-07 RX ADMIN — ACETAMINOPHEN 975 MG: 325 TABLET ORAL at 10:18

## 2024-06-07 RX ADMIN — PROPOFOL 130 MG: 10 INJECTION, EMULSION INTRAVENOUS at 11:25

## 2024-06-07 RX ADMIN — ONDANSETRON 4 MG: 2 INJECTION INTRAMUSCULAR; INTRAVENOUS at 11:49

## 2024-06-07 RX ADMIN — PROPOFOL 40 MG: 10 INJECTION, EMULSION INTRAVENOUS at 11:27

## 2024-06-07 RX ADMIN — ROCURONIUM 50 MG: 100 INJECTION, SOLUTION INTRAVENOUS at 11:27

## 2024-06-07 SDOH — HEALTH STABILITY: MENTAL HEALTH: CURRENT SMOKER: 0

## 2024-06-07 ASSESSMENT — PAIN - FUNCTIONAL ASSESSMENT
PAIN_FUNCTIONAL_ASSESSMENT: 0-10
PAIN_FUNCTIONAL_ASSESSMENT: UNABLE TO SELF-REPORT
PAIN_FUNCTIONAL_ASSESSMENT: UNABLE TO SELF-REPORT
PAIN_FUNCTIONAL_ASSESSMENT: 0-10

## 2024-06-07 ASSESSMENT — PAIN SCALES - GENERAL
PAINLEVEL_OUTOF10: 0 - NO PAIN

## 2024-06-07 ASSESSMENT — COLUMBIA-SUICIDE SEVERITY RATING SCALE - C-SSRS
2. HAVE YOU ACTUALLY HAD ANY THOUGHTS OF KILLING YOURSELF?: NO
1. IN THE PAST MONTH, HAVE YOU WISHED YOU WERE DEAD OR WISHED YOU COULD GO TO SLEEP AND NOT WAKE UP?: NO
6. HAVE YOU EVER DONE ANYTHING, STARTED TO DO ANYTHING, OR PREPARED TO DO ANYTHING TO END YOUR LIFE?: NO
6. HAVE YOU EVER DONE ANYTHING, STARTED TO DO ANYTHING, OR PREPARED TO DO ANYTHING TO END YOUR LIFE?: NO
1. IN THE PAST MONTH, HAVE YOU WISHED YOU WERE DEAD OR WISHED YOU COULD GO TO SLEEP AND NOT WAKE UP?: NO
2. HAVE YOU ACTUALLY HAD ANY THOUGHTS OF KILLING YOURSELF?: NO

## 2024-06-07 NOTE — PERIOPERATIVE NURSING NOTE
1214 Pt arrived to pacu bay 43 at this time, report received from OR and anesthesia staff. Phase 1 care started. Assuming care of pt at this time. Bedside report received from outgoing RN. Message sent to family via text at this time.   Anesthesia at bedside treating /107     1215 no change in assessment     1225 o2 and nasal trumpet removed     1230 SBP improving, otherwise no change in assessment     1235 given ginger ale     1245 no change in assessment, Message sent to family via text at this time.     1255 Pt meets discharge criteria from phase 1 at this time

## 2024-06-07 NOTE — OP NOTE
Circumcision Operative Note     Date: 2024  OR Location: [facility] OR    Name: Yaniv Bearden, : 1945, Age: 78 y.o., MRN: [id number], Sex: male    Diagnosis  Pre-op Diagnosis     * Phimosis [N47.1]     * Foreskin problem [N47.8] Post-op Diagnosis     * Phimosis [N47.1]     * Foreskin problem [N47.8]     Procedures  Circumcision  [address] - [place] CIRCUMCISION AGE >28 DAYS      Surgeons      * Ramy Castle - Primary    Resident/Fellow/Other Assistant:  Surgeons and Role:     * Earl Prieto MD - Resident - Assisting    Procedure Summary  Anesthesia: Monitor Anesthesia Care  ASA: III  Anesthesia Staff: Anesthesiologist: Gasper Madrigal MD  C-AA: VIOLET Hilario  Estimated Blood Loss: nil mL  Intra-op Medications: Administrations occurring from 1200 to 1300 on 24:  * No intraprocedure medications in log *           Anesthesia Record               Intraprocedure I/O Totals       None           Specimen:   ID Type Source Tests Collected by Time   1 : foreskin Tissue FORESKIN, OTHER THAN  SURGICAL PATHOLOGY EXAM Ramy Castle MD 2024 1146        Staff:   Circulator: Lisa Velazquez Person: [place]  Circulator: John         Drains and/or Catheters: * None in log *    Tourniquet Times:         Implants:     Findings: uncomplicated sleeve circumcision    Indications: Yaniv Bearden is an 78 y.o. male who is having surgery for Phimosis [N47.1]  Foreskin problem [N47.8].     The patient was seen in the preoperative area. The risks, benefits, complications, treatment options, non-operative alternatives, expected recovery and outcomes were discussed with the patient. The possibilities of reaction to medication, pulmonary aspiration, injury to surrounding structures, bleeding, recurrent infection, the need for additional procedures, failure to diagnose a condition, and creating a complication requiring transfusion or operation were discussed with the patient. The patient concurred with the  proposed plan, giving informed consent.  The site of surgery was properly noted/marked if necessary per policy. The patient has been actively warmed in preoperative area. Preoperative antibiotics have been ordered and given within 1 hours of incision. Venous thrombosis prophylaxis have been ordered including bilateral sequential compression devices    Procedure Details:   The patient was brought to the OR and after good general anesthesia was achieved, the patient was prepped and draped in supine position. All pressure points were padded.  Surgical pause was performed.  The patient was identified using 2 identifiers.  The correct surgical procedure was confirmed.  All members of surgical and anesthesia team were in agreement.  The patient received prophylactic antibiotic and the procedure started.    Began by marking the outer penile skin at the coronal ridge. We then reduced the foreskin and marked the inner skin approximately 1cm below the coronal ridge. Both markings were then incised with a 15 blade down to dartos, and the sleeve of redundant skin was peeled off the dartos layer. The penile skin was then reapproximated with 3-0 and 4-0 chromic interrupted sutures. A dorsal nerve and a penile block was performed. The wound was dressed with vaseline gauze, kerlex, and coban.    The patient tolerated the procedure well and was shifted to recovery in good general condition    Complications:  None; patient tolerated the procedure well.    Disposition: PACU - hemodynamically stable.  Condition: stable         Additional Details:     Attending Attestation:     Raym Castle  Phone Number: [phone]

## 2024-06-07 NOTE — ANESTHESIA PREPROCEDURE EVALUATION
Patient: Yaniv Bearden    Procedure Information       Date/Time: 06/07/24 1200    Procedure: Circumcision    Location: [facility] OR 02 / Virtual [facility] OR    Surgeons: Ramy Castle MD            Relevant Problems   Anesthesia (within normal limits)      Cardiac   (+) Benign essential hypertension   (+) Hyperlipemia   (+) PVD (peripheral vascular disease) (CMS-HCC)      Neuro  Parkinson's      /Renal   (+) BPH with obstruction/lower urinary tract symptoms   (+) Urinary tract infection      ID   (+) Urinary tract infection       Clinical information reviewed:   Tobacco  Allergies  Meds   Med Hx  Surg Hx   Fam Hx  Soc Hx        NPO Detail:  NPO/Void Status  Carbohydrate Drink Given Prior to Surgery? : N  Date of Last Liquid: 06/06/24  Time of Last Liquid: 2000  Date of Last Solid: 06/06/24  Time of Last Solid: 2000  Last Intake Type: Clear fluids  Time of Last Void: 0800         Physical Exam    Airway  Mallampati: III  TM distance: >3 FB  Neck ROM: full     Cardiovascular    Dental    Pulmonary    Abdominal            Anesthesia Plan    History of general anesthesia?: yes  History of complications of general anesthesia?: no    ASA 3     general     The patient is not a current smoker.    intravenous induction   Anesthetic plan and risks discussed with patient.    Plan discussed with CAA.

## 2024-06-07 NOTE — ANESTHESIA POSTPROCEDURE EVALUATION
Patient: Yaniv Bearden    Procedure Summary       Date: 06/07/24 Room / Location: [facility] OR 02 / [facility] OR    Anesthesia Start: 1110 Anesthesia Stop: 1220    Procedure: Circumcision Diagnosis:       Phimosis      Foreskin problem      (Phimosis [N47.1])      (Foreskin problem [N47.8])    Surgeons: Ramy Castle MD Responsible Provider: Gasper Madrigal MD    Anesthesia Type: general ASA Status: 3            Anesthesia Type: general    Vitals Value Taken Time   /74 06/07/24 1255   Temp 36 °C (96.8 °F) 06/07/24 1255   Pulse 76 06/07/24 1255   Resp 16 06/07/24 1255   SpO2 96 % 06/07/24 1255       Anesthesia Post Evaluation    Patient location during evaluation: PACU  Patient participation: complete - patient participated  Level of consciousness: awake  Pain management: adequate  Multimodal analgesia pain management approach  Airway patency: patent  Cardiovascular status: acceptable  Respiratory status: acceptable  Hydration status: acceptable  Postoperative Nausea and Vomiting: none  Comments: Will continue to assess PONV status.        No notable events documented.

## 2024-06-07 NOTE — ANESTHESIA PROCEDURE NOTES
Airway  Date/Time: 6/7/2024 11:28 AM  Urgency: elective    Airway not difficult    Staffing  Performed: VIOLET   Authorized by: Gasper Madrigal MD    Performed by: VIOLET Hilario  Patient location during procedure: OR    Indications and Patient Condition  Indications for airway management: anesthesia  Spontaneous Ventilation: absent  Sedation level: deep  Preoxygenated: yes  Patient position: sniffing  Mask difficulty assessment: 1 - vent by mask    Final Airway Details  Final airway type: endotracheal airway      Successful airway: ETT  Cuffed: yes   Successful intubation technique: video laryngoscopy  Endotracheal tube insertion site: oral  Blade: Jacque  Blade size: #4  ETT size (mm): 7.5  Cormack-Lehane Classification: grade I - full view of glottis  Placement verified by: capnometry   Measured from: lips  ETT to lips (cm): 22  Number of attempts at approach: 1

## 2024-06-10 ASSESSMENT — PAIN SCALES - GENERAL: PAINLEVEL_OUTOF10: 0 - NO PAIN

## 2024-06-14 ENCOUNTER — APPOINTMENT (OUTPATIENT)
Dept: PRIMARY CARE | Facility: CLINIC | Age: 79
End: 2024-06-14
Payer: MEDICARE

## 2024-06-14 VITALS
RESPIRATION RATE: 16 BRPM | DIASTOLIC BLOOD PRESSURE: 70 MMHG | HEART RATE: 72 BPM | TEMPERATURE: 98.3 F | BODY MASS INDEX: 27.34 KG/M2 | SYSTOLIC BLOOD PRESSURE: 120 MMHG | WEIGHT: 169.4 LBS

## 2024-06-14 DIAGNOSIS — I10 BENIGN ESSENTIAL HYPERTENSION: Primary | ICD-10-CM

## 2024-06-14 DIAGNOSIS — R60.0 PEDAL EDEMA: ICD-10-CM

## 2024-06-14 DIAGNOSIS — K40.90 UNILATERAL INGUINAL HERNIA WITHOUT OBSTRUCTION OR GANGRENE, RECURRENCE NOT SPECIFIED: ICD-10-CM

## 2024-06-14 DIAGNOSIS — G20.B1 PARKINSON'S DISEASE WITH DYSKINESIA, UNSPECIFIED WHETHER MANIFESTATIONS FLUCTUATE (MULTI): ICD-10-CM

## 2024-06-14 PROCEDURE — 99214 OFFICE O/P EST MOD 30 MIN: CPT | Performed by: INTERNAL MEDICINE

## 2024-06-14 PROCEDURE — 3074F SYST BP LT 130 MM HG: CPT | Performed by: INTERNAL MEDICINE

## 2024-06-14 PROCEDURE — 3078F DIAST BP <80 MM HG: CPT | Performed by: INTERNAL MEDICINE

## 2024-06-14 PROCEDURE — 1036F TOBACCO NON-USER: CPT | Performed by: INTERNAL MEDICINE

## 2024-06-14 PROCEDURE — 1159F MED LIST DOCD IN RCRD: CPT | Performed by: INTERNAL MEDICINE

## 2024-06-14 RX ORDER — FUROSEMIDE 40 MG/1
40 TABLET ORAL DAILY
Qty: 30 TABLET | Refills: 2 | Status: SHIPPED | OUTPATIENT
Start: 2024-06-14 | End: 2024-09-12

## 2024-06-14 NOTE — PROGRESS NOTES
Yaniv Bearden is a 78 y.o. male   Patient with a with history of hypertension, hyperlipidemia, Lumbar Spondylosis, Parkinson's disease, BPH with LUTS s/p HoLEP, Pedal edema, Padget's disease, Glaucoma    Had circumcision done for phimosis  Noted to have a hernia  It is getting bigger    No chest pain/  SOB/ dizziness  BM OK  Energy level ok  Appetite OK             Review of Systems     Constitutional: no fever, no chills, not feeling poorly, not feeling tired and no recent weight gain, no recent weight loss.   ENT: no earache, no hearing loss, no nosebleeds, no nasal discharge, no sore throat and no hoarseness.   Cardiovascular: the heart rate was not slow, the heart rate was not fast, no chest pain, no palpitations, no intermittent leg claudication   Respiratory: no cough, wheezing or shortness of breath at rest or exertion  Gastrointestinal: no abdominal pain, no constipation, no melena, no nausea, no diarrhea, no vomiting and no blood in stools.   Musculoskeletal: no arthralgias, no myalgias, no back pain, no joint swelling, no joint stiffness, no limb pain and no limb swelling.   Integumentary: no skin rashes, no skin lesions, no itching, no skin wound and no dry skin.   Neurological: no headache, no confusion, no numbness, no dizziness, no tingling and no fainting.   All other systems have been reviewed and are negative for complaint.       There were no vitals filed for this visit.       Physical Exam     Constitutional   General appearance: Alert and in no acute distress.     Pulmonary   Respiratory assessment: No respiratory distress, normal respiratory rhythm and effort.    Auscultation of Lungs: Clear bilateral breath sounds.   Cardiovascular   Auscultation of heart: Apical pulse normal, heart rate and rhythm normal, normal S1 and S2, no murmurs and no pericardial rub.    Exam for edema: ++ edema  Abdomen   Abdominal Exam: No bruits, normal bowel sounds, soft, non-tender, no abdominal mass palpated.     Liver and Spleen exam: No hepato-splenomegaly.   Right Inguinal hernia, partially reducible  Musculoskeletal   Examination of gait: Normal.    Inspection of digits and nails: No clubbing or cyanosis of the fingernails.    Inspection/palpation of joints, bones and muscles: No joint swelling. Normal movement of all extremities.   Skin   Skin inspection: Normal skin color and pigmentation, normal skin turgor and no visible rash.   Neurologic   Cranial nerves: Nerves 2-12 were intact, no focal neuro defects.     Assessment/Plan          Patient with a with history of hypertension, hyperlipidemia, Lumbar Spondylosis, Parkinson's disease, BPH with LUTS s/p HoLEP, Pedal edema, Padget's disease, Glaucoma    # HTN/ Pedal edema  Did not tolerate Bumex (made him go to the bathroom a lot)  So was back on lasix  Increase lasix to 40 mg     # Parkinson's Disease  Getting more symptomatic  Counseled on compliance with the timing  Management as per  Dr Ramos     # HLD  Stable  Continue current medications  Watch salt, avoid excessive caffeine  Avoid processed meats/ sugars/juices Instead eat fresh fruit  Add walnuts and almonds to your diet  exercise 6 days a week for 30 minutes    #Benign prostatic hypertrophy with urine retention  Phimosis s/p circumcision  Being managed by urology    # Inguinal hernia  Refer to surgery

## 2024-06-25 LAB
LABORATORY COMMENT REPORT: NORMAL
PATH REPORT.FINAL DX SPEC: NORMAL
PATH REPORT.GROSS SPEC: NORMAL
PATH REPORT.RELEVANT HX SPEC: NORMAL
PATH REPORT.TOTAL CANCER: NORMAL

## 2024-07-11 ENCOUNTER — OFFICE VISIT (OUTPATIENT)
Dept: UROLOGY | Facility: HOSPITAL | Age: 79
End: 2024-07-11
Payer: MEDICARE

## 2024-07-11 ENCOUNTER — OFFICE VISIT (OUTPATIENT)
Dept: SURGERY | Facility: CLINIC | Age: 79
End: 2024-07-11
Payer: MEDICARE

## 2024-07-11 VITALS
BODY MASS INDEX: 27.32 KG/M2 | WEIGHT: 170 LBS | DIASTOLIC BLOOD PRESSURE: 81 MMHG | HEIGHT: 66 IN | TEMPERATURE: 97 F | HEART RATE: 73 BPM | SYSTOLIC BLOOD PRESSURE: 155 MMHG

## 2024-07-11 DIAGNOSIS — Z87.438: Primary | ICD-10-CM

## 2024-07-11 DIAGNOSIS — K40.90 UNILATERAL INGUINAL HERNIA WITHOUT OBSTRUCTION OR GANGRENE, RECURRENCE NOT SPECIFIED: ICD-10-CM

## 2024-07-11 PROCEDURE — 99203 OFFICE O/P NEW LOW 30 MIN: CPT | Performed by: SURGERY

## 2024-07-11 PROCEDURE — 99213 OFFICE O/P EST LOW 20 MIN: CPT | Performed by: SURGERY

## 2024-07-11 PROCEDURE — 99211 OFF/OP EST MAY X REQ PHY/QHP: CPT | Performed by: UROLOGY

## 2024-07-11 PROCEDURE — 3077F SYST BP >= 140 MM HG: CPT | Performed by: SURGERY

## 2024-07-11 PROCEDURE — 1159F MED LIST DOCD IN RCRD: CPT | Performed by: SURGERY

## 2024-07-11 PROCEDURE — 1036F TOBACCO NON-USER: CPT | Performed by: SURGERY

## 2024-07-11 PROCEDURE — 3079F DIAST BP 80-89 MM HG: CPT | Performed by: SURGERY

## 2024-07-11 PROCEDURE — 1126F AMNT PAIN NOTED NONE PRSNT: CPT | Performed by: SURGERY

## 2024-07-11 ASSESSMENT — PAIN SCALES - GENERAL: PAINLEVEL: 0-NO PAIN

## 2024-07-11 ASSESSMENT — ENCOUNTER SYMPTOMS: DEPRESSION: 0

## 2024-07-11 NOTE — PROGRESS NOTES
HPI    78 y.o. male being seen with the following problem list:    Problem list:  1.BPH/retention s/p HoLEP 5/2022 with Dr. Johnson  2. Phimosis, s/p circumcision 6/7/24    Patient was in urinary retention with a prostate volume of 138 cmÂ³. He underwent an uneventful holep on May 28, 2022. He developed significant hematuria in the postoperative area and was taken back for clot evacuation and fulguration.  He passed a voiding trial on May 23, 2022. Pathology showed 47.6 g of benign prostatic tissue     Last seen by Dr. johnson 6/10/22. good stream, no leakage, PVR 0ml     9/20/22 - comes in for follow up. Voiding well. Has some baseline urgency but no incontinence. Having issues retracting his foreskin.     11/15/22- Foreskin is manageable, has been using the cream. Voiding well. Urgency is minimal. Would not like anything done at this point.          11/10/23 - seen today for 1yr fuv with PVR. No urinary issues. PVR 1cc. Foreskin problem resolved. Notes longstanding ED, naive to meds. Has PD. No nitrates         3/20/24 - seen via thv for 3mo medication response to Sildenafil 100mg po prn. He currently has the flu. He reports that the sildenafil is helping with his erections. He is still having trouble with his foreskin, would like to come in to discuss this further in June/July 4/26/24 - notes more issues with his foreskin. Can retract, but very redundant and hard to pull back down, gets in the way, would like a circumcision. Also notes a bothersome bulge in his R groin.    6/7/24 - circumcision    7/11/24 - seen today for POV.  Has done well since circumcision.  Last week was walking down some stairs and he had a twinge of pain on the left side of his penis.    Lab Results   Component Value Date    PSA 0.69 02/24/2023    PSA 6.02 (H) 02/17/2022         Current Medications:  Current Outpatient Medications   Medication Sig Dispense Refill    acetaminophen (Tylenol) 325 mg tablet Take 2 tablets (650 mg) by mouth every  6 hours if needed for mild pain (1 - 3). 30 tablet 0    bacitracin 500 unit/gram ointment Apply topically 2 times a day. 28 g 0    carbidopa-levodopa (Sinemet)  mg tablet Take 1 tablet by mouth 3 times a day. Take one tablet 3 times daily at 12 PM, 4 PM, 8 PM. 270 tablet 3    dorzolamide-timoloL (Cosopt) 22.3-6.8 mg/mL ophthalmic solution Dorzolamide HCl-Timolol Mal 22.3-6.8 MG/ML Ophthalmic Solution   Quantity: 10  Refills: 0        Start : 2-Mar-2021   Active      furosemide (Lasix) 40 mg tablet Take 1 tablet (40 mg) by mouth once daily. 30 tablet 2    ibuprofen 600 mg tablet Take 1 tablet (600 mg) by mouth every 6 hours if needed for mild pain (1 - 3). 30 tablet 0    latanoprost (Xalatan) 0.005 % ophthalmic solution Administer 1 drop into both eyes once daily at bedtime.      losartan (Cozaar) 50 mg tablet Take 1 tablet (50 mg) by mouth once daily. 90 tablet 0    multivitamin tablet Take 1 tablet by mouth once daily.      oxyCODONE (Roxicodone) 5 mg immediate release tablet Take 1 tablet (5 mg) by mouth every 6 hours if needed for severe pain (7 - 10). 5 tablet 0    simvastatin (Zocor) 40 mg tablet Take 1 tablet (40 mg) by mouth once daily at bedtime. 60 tablet 1     No current facility-administered medications for this visit.        Active Problems:  Yaniv Bearden is a 78 y.o. male with the following Problems and Medications.  Patient Active Problem List   Diagnosis    Acquired spondylolisthesis    Back pain    Benign essential hypertension    BPH with obstruction/lower urinary tract symptoms    Calf swelling    Enlarged prostate without lower urinary tract symptoms (luts)    Frequent urination    Gait instability    Hematuria    Paget's bone disease    Venous insufficiency    Urinary tract infection    Urinary retention    Rotator cuff arthropathy    Right shoulder pain    Phimosis    Parkinson's disease with dyskinesia, unspecified whether manifestations fluctuate (Multi)    Hyperlipemia    Pedal edema     PVD (peripheral vascular disease) (CMS-[facility])    Chronic constipation    HT (hammer toe)    Arthritis of foot    Ankle arthritis    Foreskin problem    Unilateral inguinal hernia without obstruction or gangrene     Current Outpatient Medications   Medication Sig Dispense Refill    acetaminophen (Tylenol) 325 mg tablet Take 2 tablets (650 mg) by mouth every 6 hours if needed for mild pain (1 - 3). 30 tablet 0    bacitracin 500 unit/gram ointment Apply topically 2 times a day. 28 g 0    carbidopa-levodopa (Sinemet)  mg tablet Take 1 tablet by mouth 3 times a day. Take one tablet 3 times daily at 12 PM, 4 PM, 8 PM. 270 tablet 3    dorzolamide-timoloL (Cosopt) 22.3-6.8 mg/mL ophthalmic solution Dorzolamide HCl-Timolol Mal 22.3-6.8 MG/ML Ophthalmic Solution   Quantity: 10  Refills: 0        Start : 2-Mar-2021   Active      furosemide (Lasix) 40 mg tablet Take 1 tablet (40 mg) by mouth once daily. 30 tablet 2    ibuprofen 600 mg tablet Take 1 tablet (600 mg) by mouth every 6 hours if needed for mild pain (1 - 3). 30 tablet 0    latanoprost (Xalatan) 0.005 % ophthalmic solution Administer 1 drop into both eyes once daily at bedtime.      losartan (Cozaar) 50 mg tablet Take 1 tablet (50 mg) by mouth once daily. 90 tablet 0    multivitamin tablet Take 1 tablet by mouth once daily.      oxyCODONE (Roxicodone) 5 mg immediate release tablet Take 1 tablet (5 mg) by mouth every 6 hours if needed for severe pain (7 - 10). 5 tablet 0    simvastatin (Zocor) 40 mg tablet Take 1 tablet (40 mg) by mouth once daily at bedtime. 60 tablet 1     No current facility-administered medications for this visit.       PMH:  Past Medical History:   Diagnosis Date    Acquired spondylolisthesis     Arthritis     BPH with obstruction/lower urinary tract symptoms     Chronic constipation     Foreskin problem     Gait instability     H/O echocardiogram 02/24/2023    CONCLUSIONS: 1. Left ventricular systolic function is normal with a 55-60%  estimated ejection fraction. 2. No left ventricular thrombus visualized. 3. RVSP within normal limits. 4. There is plaque visualized in the ascending aorta.    Hyperlipidemia     Hypertension     Overweight     Paget's bone disease     Parkinson disease (Multi)     Phimosis     PVD (peripheral vascular disease) (CMS-HCC)     Tremor     Venous insufficiency        PSH:  Past Surgical History:   Procedure Laterality Date    PROSTATE SURGERY  05/13/2022       FMH:  Family History   Problem Relation Name Age of Onset    No Known Problems Mother      No Known Problems Father      Heart disease Sister         SHx:  Social History     Tobacco Use    Smoking status: Never    Smokeless tobacco: Never   Vaping Use    Vaping status: Never Used   Substance Use Topics    Alcohol use: Not Currently     Alcohol/week: 1.0 standard drink of alcohol     Types: 1 Standard drinks or equivalent per week    Drug use: Never       Allergies:  No Known Allergies    Physical Exam:  Exam shows a very well-healed circumcision.  No abnormalities at all.  Excellent cosmetic result.    Assessment/Plan  Very appropriately reassuring physical exam.  He like to come back in next week to make sure that nothing is changed and his pain is persistent.  This is reasonable and I will be happy to see him.  Otherwise I will see him in 1 year with PVR, sooner as needed.      Scribe Attestation  By signing my name below, I, Lb Koo, attest that this documentation  has been prepared under the direction and in the presence of Ramy Castle MD.

## 2024-07-11 NOTE — PROGRESS NOTES
"Subjective   Patient ID: Yaniv Bearden is a 78 y.o. male who presents for Hernia.  HPI  Pt is a pleasant 78 year old gentleman with Parkinson's who presents for evaluation and treatment of a right inguinal hernia.  His wife noted a lump in his right groin several months ago.  He states that he often has to \"push it in \"but otherwise denies any other symptoms.  Very rarely he gets some discomfort     Review of Systems     On review of systems patient denies any weight loss, fever, change in bowel habits, melena, hematochezia, coronary artery disease, diabetes,  , TIA/CVA, bleeding, jaundice, pancreatitis, hepatitis.       Social History     Socioeconomic History    Marital status:      Spouse name: Not on file    Number of children: Not on file    Years of education: Not on file    Highest education level: Not on file   Occupational History    Not on file   Tobacco Use    Smoking status: Never    Smokeless tobacco: Never   Vaping Use    Vaping status: Never Used   Substance and Sexual Activity    Alcohol use: Not Currently     Alcohol/week: 1.0 standard drink of alcohol     Types: 1 Standard drinks or equivalent per week    Drug use: Never    Sexual activity: Defer   Other Topics Concern    Not on file   Social History Narrative    Not on file     Social Determinants of Health     Financial Resource Strain: Not on file   Food Insecurity: Not on file   Transportation Needs: Not on file   Physical Activity: Not on file   Stress: Not on file   Social Connections: Not on file   Intimate Partner Violence: Not on file   Housing Stability: Not on file        Past Medical History:   Diagnosis Date    Acquired spondylolisthesis     Arthritis     BPH with obstruction/lower urinary tract symptoms     Chronic constipation     Foreskin problem     Gait instability     H/O echocardiogram 02/24/2023    CONCLUSIONS: 1. Left ventricular systolic function is normal with a 55-60% estimated ejection fraction. 2. No left " ventricular thrombus visualized. 3. RVSP within normal limits. 4. There is plaque visualized in the ascending aorta.    Hyperlipidemia     Hypertension     Overweight     Paget's bone disease     Parkinson disease (Multi)     Phimosis     PVD (peripheral vascular disease) (CMS-[facility])     Tremor     Venous insufficiency     hypertension, hyperlipidemia, Lumbar Spondylosis, Parkinson's disease, BPH with LUTS s/p HoLEP, Pedal edema, Padget's disease, Glaucoma     Past Surgical History:   Procedure Laterality Date    PROSTATE SURGERY  05/13/2022            Objective     Physical Exam    Frail elderly man.  In no distress  Alert and oriented x 3  Lungs are clear to auscultation bilaterally.  Cardiac exam is regular rhythm and rate.  Abdomen is soft nontender nondistended.  Neurologic exam is without focal deficit.   Obvious, small to moderate sized right inguinal hernia.  Reduces easily.  No hernias noted on the left side    Assessment/Plan        Impression: Right inguinal hernia.  For the most part he is asymptomatic.  I did offer l robotic right inguinal hernia repair with mesh.  This was discussed in detail with Mr. Bearden and his significant other.  At this time he is reluctant to consider surgery.    Will establish strategy of watchful waiting which is not unreasonable given his lack of symptoms.  Of asked him to follow-up with me in 4 months, or sooner should he develop symptoms.  He and his significant other agree with this plan

## 2024-07-11 NOTE — LETTER
"July 11, 2024     Osvaldo Stover MD  [address]   [facility] [address]  [facility] [address]    Patient: Yaniv Bearden   YOB: 1945   Date of Visit: 7/11/2024       Dear Dr. Osvaldo Stover MD:    Thank you for referring Yaniv Bearden to me for evaluation. Below are my notes for this consultation.  If you have questions, please do not hesitate to call me. I look forward to following your patient along with you.       Sincerely,     Jesus Whitten MD      CC: Ramy Castle MD  ______________________________________________________________________________________    Subjective   Patient ID: Yaniv Bearden is a 78 y.o. male who presents for Hernia.  HPI  Pt is a pleasant 78 year old gentleman with Parkinson's who presents for evaluation and treatment of a right inguinal hernia.  His wife noted a lump in his right groin several months ago.  He states that he often has to \"push it in \"but otherwise denies any other symptoms.  Very rarely he gets some discomfort     Review of Systems     On review of systems patient denies any weight loss, fever, change in bowel habits, melena, hematochezia, coronary artery disease, diabetes,  , TIA/CVA, bleeding, jaundice, pancreatitis, hepatitis.       Social History     Socioeconomic History   • Marital status:      Spouse name: Not on file   • Number of children: Not on file   • Years of education: Not on file   • Highest education level: Not on file   Occupational History   • Not on file   Tobacco Use   • Smoking status: Never   • Smokeless tobacco: Never   Vaping Use   • Vaping status: Never Used   Substance and Sexual Activity   • Alcohol use: Not Currently     Alcohol/week: 1.0 standard drink of alcohol     Types: 1 Standard drinks or equivalent per week   • Drug use: Never   • Sexual activity: Defer   Other Topics Concern   • Not on file   Social History Narrative   • Not on file     Social Determinants of Health     Financial Resource Strain: Not on file   Food " Insecurity: Not on file   Transportation Needs: Not on file   Physical Activity: Not on file   Stress: Not on file   Social Connections: Not on file   Intimate Partner Violence: Not on file   Housing Stability: Not on file        Past Medical History:   Diagnosis Date   • Acquired spondylolisthesis    • Arthritis    • BPH with obstruction/lower urinary tract symptoms    • Chronic constipation    • Foreskin problem    • Gait instability    • H/O echocardiogram 02/24/2023    CONCLUSIONS: 1. Left ventricular systolic function is normal with a 55-60% estimated ejection fraction. 2. No left ventricular thrombus visualized. 3. RVSP within normal limits. 4. There is plaque visualized in the ascending aorta.   • Hyperlipidemia    • Hypertension    • Overweight    • Paget's bone disease    • Parkinson disease (Multi)    • Phimosis    • PVD (peripheral vascular disease) (CMS-[facility])    • Tremor    • Venous insufficiency     hypertension, hyperlipidemia, Lumbar Spondylosis, Parkinson's disease, BPH with LUTS s/p HoLEP, Pedal edema, Padget's disease, Glaucoma     Past Surgical History:   Procedure Laterality Date   • PROSTATE SURGERY  05/13/2022            Objective     Physical Exam    Frail elderly man.  In no distress  Alert and oriented x 3  Lungs are clear to auscultation bilaterally.  Cardiac exam is regular rhythm and rate.  Abdomen is soft nontender nondistended.  Neurologic exam is without focal deficit.   Obvious, small to moderate sized right inguinal hernia.  Reduces easily.  No hernias noted on the left side    Assessment/Plan        Impression: Right inguinal hernia.  For the most part he is asymptomatic.  I did offer l robotic right inguinal hernia repair with mesh.  This was discussed in detail with Mr. Bearden and his significant other.  At this time he is reluctant to consider surgery.    Will establish strategy of watchful waiting which is not unreasonable given his lack of symptoms.  Of asked him to follow-up  with me in 4 months, or sooner should he develop symptoms.  He and his significant other agree with this plan

## 2024-07-19 ENCOUNTER — OFFICE VISIT (OUTPATIENT)
Dept: UROLOGY | Facility: HOSPITAL | Age: 79
End: 2024-07-19
Payer: MEDICARE

## 2024-07-19 DIAGNOSIS — I10 BENIGN ESSENTIAL HYPERTENSION: ICD-10-CM

## 2024-07-19 DIAGNOSIS — N47.1 PHIMOSIS: Primary | ICD-10-CM

## 2024-07-19 DIAGNOSIS — E78.5 HYPERLIPIDEMIA, UNSPECIFIED HYPERLIPIDEMIA TYPE: ICD-10-CM

## 2024-07-19 PROCEDURE — 99211 OFF/OP EST MAY X REQ PHY/QHP: CPT | Performed by: UROLOGY

## 2024-07-19 RX ORDER — SIMVASTATIN 40 MG/1
40 TABLET, FILM COATED ORAL NIGHTLY
Qty: 60 TABLET | Refills: 1 | Status: SHIPPED | OUTPATIENT
Start: 2024-07-19 | End: 2024-11-16

## 2024-07-19 RX ORDER — LOSARTAN POTASSIUM 50 MG/1
50 TABLET ORAL DAILY
Qty: 90 TABLET | Refills: 0 | Status: SHIPPED | OUTPATIENT
Start: 2024-07-19

## 2024-07-19 NOTE — PROGRESS NOTES
HPI    78 y.o. male being seen with the following problem list:    Problem list:  1.BPH/retention s/p HoLEP 5/2022 with Dr. Johnson  2. Phimosis, s/p circumcision 6/7/24    Patient was in urinary retention with a prostate volume of 138 cmÂ³. He underwent an uneventful holep on May 28, 2022. He developed significant hematuria in the postoperative area and was taken back for clot evacuation and fulguration.  He passed a voiding trial on May 23, 2022. Pathology showed 47.6 g of benign prostatic tissue     Last seen by Dr. johnson 6/10/22. good stream, no leakage, PVR 0ml     9/20/22 - comes in for follow up. Voiding well. Has some baseline urgency but no incontinence. Having issues retracting his foreskin.     11/15/22- Foreskin is manageable, has been using the cream. Voiding well. Urgency is minimal. Would not like anything done at this point.          11/10/23 - seen today for 1yr fuv with PVR. No urinary issues. PVR 1cc. Foreskin problem resolved. Notes longstanding ED, naive to meds. Has PD. No nitrates         3/20/24 - seen via thv for 3mo medication response to Sildenafil 100mg po prn. He currently has the flu. He reports that the sildenafil is helping with his erections. He is still having trouble with his foreskin, would like to come in to discuss this further in June/July 4/26/24 - notes more issues with his foreskin. Can retract, but very redundant and hard to pull back down, gets in the way, would like a circumcision. Also notes a bothersome bulge in his R groin.    6/7/24 - circumcision    7/11/24 - seen today for POV.  Has done well since circumcision.  Last week was walking down some stairs and he had a twinge of pain on the left side of his penis.    7/19/24 - seen today for fuv, exam. Overall doing well    Lab Results   Component Value Date    PSA 0.69 02/24/2023    PSA 6.02 (H) 02/17/2022         Current Medications:  Current Outpatient Medications   Medication Sig Dispense Refill    acetaminophen  (Tylenol) 325 mg tablet Take 2 tablets (650 mg) by mouth every 6 hours if needed for mild pain (1 - 3). 30 tablet 0    bacitracin 500 unit/gram ointment Apply topically 2 times a day. (Patient not taking: Reported on 7/11/2024) 28 g 0    carbidopa-levodopa (Sinemet)  mg tablet Take 1 tablet by mouth 3 times a day. Take one tablet 3 times daily at 12 PM, 4 PM, 8 PM. 270 tablet 3    dorzolamide-timoloL (Cosopt) 22.3-6.8 mg/mL ophthalmic solution Dorzolamide HCl-Timolol Mal 22.3-6.8 MG/ML Ophthalmic Solution   Quantity: 10  Refills: 0        Start : 2-Mar-2021   Active      furosemide (Lasix) 40 mg tablet Take 1 tablet (40 mg) by mouth once daily. 30 tablet 2    ibuprofen 600 mg tablet Take 1 tablet (600 mg) by mouth every 6 hours if needed for mild pain (1 - 3). 30 tablet 0    latanoprost (Xalatan) 0.005 % ophthalmic solution Administer 1 drop into both eyes once daily at bedtime.      losartan (Cozaar) 50 mg tablet Take 1 tablet (50 mg) by mouth once daily. 90 tablet 0    multivitamin tablet Take 1 tablet by mouth once daily.      oxyCODONE (Roxicodone) 5 mg immediate release tablet Take 1 tablet (5 mg) by mouth every 6 hours if needed for severe pain (7 - 10). (Patient not taking: Reported on 7/11/2024) 5 tablet 0    simvastatin (Zocor) 40 mg tablet Take 1 tablet (40 mg) by mouth once daily at bedtime. 60 tablet 1     No current facility-administered medications for this visit.        Active Problems:  Yaniv Bearden is a 78 y.o. male with the following Problems and Medications.  Patient Active Problem List   Diagnosis    Acquired spondylolisthesis    Back pain    Benign essential hypertension    BPH with obstruction/lower urinary tract symptoms    Calf swelling    Enlarged prostate without lower urinary tract symptoms (luts)    Frequent urination    Gait instability    Hematuria    Paget's bone disease    Venous insufficiency    Urinary tract infection    Urinary retention    Rotator cuff arthropathy     Right shoulder pain    Phimosis    Parkinson's disease with dyskinesia, unspecified whether manifestations fluctuate (Multi)    Hyperlipemia    Pedal edema    PVD (peripheral vascular disease) (CMS-HCC)    Chronic constipation    HT (hammer toe)    Arthritis of foot    Ankle arthritis    Foreskin problem    Unilateral inguinal hernia without obstruction or gangrene    H/O of acquired phimosis in male     Current Outpatient Medications   Medication Sig Dispense Refill    acetaminophen (Tylenol) 325 mg tablet Take 2 tablets (650 mg) by mouth every 6 hours if needed for mild pain (1 - 3). 30 tablet 0    bacitracin 500 unit/gram ointment Apply topically 2 times a day. (Patient not taking: Reported on 7/11/2024) 28 g 0    carbidopa-levodopa (Sinemet)  mg tablet Take 1 tablet by mouth 3 times a day. Take one tablet 3 times daily at 12 PM, 4 PM, 8 PM. 270 tablet 3    dorzolamide-timoloL (Cosopt) 22.3-6.8 mg/mL ophthalmic solution Dorzolamide HCl-Timolol Mal 22.3-6.8 MG/ML Ophthalmic Solution   Quantity: 10  Refills: 0        Start : 2-Mar-2021   Active      furosemide (Lasix) 40 mg tablet Take 1 tablet (40 mg) by mouth once daily. 30 tablet 2    ibuprofen 600 mg tablet Take 1 tablet (600 mg) by mouth every 6 hours if needed for mild pain (1 - 3). 30 tablet 0    latanoprost (Xalatan) 0.005 % ophthalmic solution Administer 1 drop into both eyes once daily at bedtime.      losartan (Cozaar) 50 mg tablet Take 1 tablet (50 mg) by mouth once daily. 90 tablet 0    multivitamin tablet Take 1 tablet by mouth once daily.      oxyCODONE (Roxicodone) 5 mg immediate release tablet Take 1 tablet (5 mg) by mouth every 6 hours if needed for severe pain (7 - 10). (Patient not taking: Reported on 7/11/2024) 5 tablet 0    simvastatin (Zocor) 40 mg tablet Take 1 tablet (40 mg) by mouth once daily at bedtime. 60 tablet 1     No current facility-administered medications for this visit.       PMH:  Past Medical History:   Diagnosis Date     Acquired spondylolisthesis     Arthritis     BPH with obstruction/lower urinary tract symptoms     Chronic constipation     Foreskin problem     Gait instability     H/O echocardiogram 02/24/2023    CONCLUSIONS: 1. Left ventricular systolic function is normal with a 55-60% estimated ejection fraction. 2. No left ventricular thrombus visualized. 3. RVSP within normal limits. 4. There is plaque visualized in the ascending aorta.    Hyperlipidemia     Hypertension     Overweight     Paget's bone disease     Parkinson disease (Multi)     Phimosis     PVD (peripheral vascular disease) (CMS-HCC)     Tremor     Venous insufficiency        PSH:  Past Surgical History:   Procedure Laterality Date    PROSTATE SURGERY  05/13/2022       FMH:  Family History   Problem Relation Name Age of Onset    No Known Problems Mother      No Known Problems Father      Heart disease Sister         SHx:  Social History     Tobacco Use    Smoking status: Never    Smokeless tobacco: Never   Vaping Use    Vaping status: Never Used   Substance Use Topics    Alcohol use: Not Currently     Alcohol/week: 1.0 standard drink of alcohol     Types: 1 Standard drinks or equivalent per week    Drug use: Never       Allergies:  No Known Allergies    Physical exam:  Nicely healed circ, no concerns    Assessment/Plan  Well healing circumcision. Discussed that soreness is most likely due to ongoing healing. Will follow up in 3 months or sooner prn.      Scribe Attestation  By signing my name below, I, Lb Koo, attest that this documentation  has been prepared under the direction and in the presence of Ramy Castle MD.

## 2024-07-19 NOTE — TELEPHONE ENCOUNTER
Rx Refill Request Telephone Encounter    Name:  Yaniv Bearden  :  [id number]  Specific Pharmacy location:  [facility]

## 2024-08-14 ENCOUNTER — APPOINTMENT (OUTPATIENT)
Dept: OPHTHALMOLOGY | Facility: CLINIC | Age: 79
End: 2024-08-14
Payer: MEDICARE

## 2024-08-14 DIAGNOSIS — H40.1130 PRIMARY OPEN ANGLE GLAUCOMA OF BOTH EYES, UNSPECIFIED GLAUCOMA STAGE: ICD-10-CM

## 2024-08-14 DIAGNOSIS — H40.1133 PRIMARY OPEN ANGLE GLAUCOMA (POAG) OF BOTH EYES, SEVERE STAGE: Primary | ICD-10-CM

## 2024-08-14 PROCEDURE — 1036F TOBACCO NON-USER: CPT | Performed by: OPHTHALMOLOGY

## 2024-08-14 PROCEDURE — 99204 OFFICE O/P NEW MOD 45 MIN: CPT | Performed by: OPHTHALMOLOGY

## 2024-08-14 PROCEDURE — 1159F MED LIST DOCD IN RCRD: CPT | Performed by: OPHTHALMOLOGY

## 2024-08-14 PROCEDURE — 92083 EXTENDED VISUAL FIELD XM: CPT | Performed by: OPHTHALMOLOGY

## 2024-08-14 PROCEDURE — 92133 CPTRZD OPH DX IMG PST SGM ON: CPT | Performed by: OPHTHALMOLOGY

## 2024-08-14 PROCEDURE — 92020 GONIOSCOPY: CPT | Performed by: OPHTHALMOLOGY

## 2024-08-14 ASSESSMENT — VISUAL ACUITY
OD_SC: 20/40
OS_SC: 20/20
CORRECTION_TYPE: GLASSES
OD_SC+: +1
OS_CC: 20/20
OD_CC: 20/25
OD_CC+: -1+1
METHOD: SNELLEN - LINEAR

## 2024-08-14 ASSESSMENT — CONF VISUAL FIELD
OS_INFERIOR_NASAL_RESTRICTION: 0
OD_SUPERIOR_NASAL_RESTRICTION: 0
OS_NORMAL: 1
OS_INFERIOR_TEMPORAL_RESTRICTION: 0
OD_SUPERIOR_TEMPORAL_RESTRICTION: 3
OD_INFERIOR_NASAL_RESTRICTION: 1
OS_SUPERIOR_NASAL_RESTRICTION: 0
OD_INFERIOR_TEMPORAL_RESTRICTION: 3
METHOD: COUNTING FINGERS
OS_SUPERIOR_TEMPORAL_RESTRICTION: 0

## 2024-08-14 ASSESSMENT — PACHYMETRY
OD_CT(UM): 572
OS_CT(UM): 587

## 2024-08-14 ASSESSMENT — TONOMETRY
IOP_METHOD: GOLDMANN APPLANATION
OS_IOP_MMHG: 16
OD_IOP_MMHG: 14
IOP_METHOD: TONOPEN
OS_IOP_MMHG: 14
OD_IOP_MMHG: 16

## 2024-08-14 ASSESSMENT — GONIOSCOPY
OD_INFERIOR: SS
OS_TEMPORAL: SS
OD_SUPERIOR: SS
OS_SUPERIOR: SS
OS_NASAL: SS
OD_TEMPORAL: SS
OS_INFERIOR: SS
OD_NASAL: SS

## 2024-08-14 ASSESSMENT — CUP TO DISC RATIO
OD_RATIO: 0.85
OS_RATIO: 0.85

## 2024-08-14 ASSESSMENT — ENCOUNTER SYMPTOMS
RESPIRATORY NEGATIVE: 0
EYES NEGATIVE: 1
MUSCULOSKELETAL NEGATIVE: 0
NEUROLOGICAL NEGATIVE: 0
ALLERGIC/IMMUNOLOGIC NEGATIVE: 0
CONSTITUTIONAL NEGATIVE: 0
PSYCHIATRIC NEGATIVE: 0
GASTROINTESTINAL NEGATIVE: 0
ENDOCRINE NEGATIVE: 0
HEMATOLOGIC/LYMPHATIC NEGATIVE: 0
CARDIOVASCULAR NEGATIVE: 0

## 2024-08-14 ASSESSMENT — EXTERNAL EXAM - LEFT EYE: OS_EXAM: NORMAL

## 2024-08-14 ASSESSMENT — EXTERNAL EXAM - RIGHT EYE: OD_EXAM: NORMAL

## 2024-08-14 NOTE — PROGRESS NOTES
8/14/2024 CC: 78 y.o. presents for glaucoma evaluation.     Past ocular history: Glaucoma, SLT OU 3 yrs ago, Pseudophakia OU 9 yrs ago  Family history: no family history of glaucoma   Past medical history: Parkinson's, HTN  Social history: denies tobacco    Eye medications:  Both eyes: Cosopt bid, latanoprost qhs OU, for 4 yrs    Allergy:  Brimonidine- itching    Testing:    HVF 24-2 8/14/2024: OD- residual temporal island, MD -27.8. OS- FL, IAD> SAD, MD -19.9 dB    OCT 11/30/2023: OD- thin S/I, avg 59 (artifacts). OS- thin S/I, avg 53 um.  OCT 8/14/2024:  OD- thin S/I, avg 66. OS- thin S/I, avg 67 um. 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 POAG, severe OD>OS, IOP acceptable    Eye medications:   Both eyes: Continue Cosopt bid, latanoprost qhs OU    Return in 6 months,update testing, possible OD YAG cap.

## 2024-09-03 ENCOUNTER — APPOINTMENT (OUTPATIENT)
Dept: OPHTHALMOLOGY | Facility: CLINIC | Age: 79
End: 2024-09-03
Payer: MEDICARE

## 2024-09-07 ENCOUNTER — APPOINTMENT (OUTPATIENT)
Dept: RADIOLOGY | Facility: HOSPITAL | Age: 79
End: 2024-09-07
Payer: MEDICARE

## 2024-09-07 ENCOUNTER — HOSPITAL ENCOUNTER (EMERGENCY)
Facility: HOSPITAL | Age: 79
Discharge: HOME | End: 2024-09-07
Attending: EMERGENCY MEDICINE
Payer: MEDICARE

## 2024-09-07 VITALS
WEIGHT: 169.97 LBS | HEIGHT: 66 IN | HEART RATE: 79 BPM | SYSTOLIC BLOOD PRESSURE: 110 MMHG | DIASTOLIC BLOOD PRESSURE: 66 MMHG | TEMPERATURE: 99.9 F | RESPIRATION RATE: 17 BRPM | OXYGEN SATURATION: 98 % | BODY MASS INDEX: 27.32 KG/M2

## 2024-09-07 DIAGNOSIS — U07.1 COVID: Primary | ICD-10-CM

## 2024-09-07 LAB
ALBUMIN SERPL BCP-MCNC: 3.9 G/DL (ref 3.4–5)
ALP SERPL-CCNC: 57 U/L (ref 33–136)
ALT SERPL W P-5'-P-CCNC: 8 U/L (ref 10–52)
ANION GAP SERPL CALC-SCNC: 11 MMOL/L (ref 10–20)
APPEARANCE UR: CLEAR
AST SERPL W P-5'-P-CCNC: 16 U/L (ref 9–39)
BASOPHILS # BLD MANUAL: 0 X10*3/UL (ref 0–0.1)
BASOPHILS NFR BLD MANUAL: 0 %
BILIRUB SERPL-MCNC: 1.4 MG/DL (ref 0–1.2)
BILIRUB UR STRIP.AUTO-MCNC: NEGATIVE MG/DL
BUN SERPL-MCNC: 11 MG/DL (ref 6–23)
CALCIUM SERPL-MCNC: 8.9 MG/DL (ref 8.6–10.3)
CHLORIDE SERPL-SCNC: 102 MMOL/L (ref 98–107)
CO2 SERPL-SCNC: 31 MMOL/L (ref 21–32)
COLOR UR: YELLOW
CREAT SERPL-MCNC: 1.09 MG/DL (ref 0.5–1.3)
EGFRCR SERPLBLD CKD-EPI 2021: 69 ML/MIN/1.73M*2
EOSINOPHIL # BLD MANUAL: 0 X10*3/UL (ref 0–0.4)
EOSINOPHIL NFR BLD MANUAL: 0 %
ERYTHROCYTE [DISTWIDTH] IN BLOOD BY AUTOMATED COUNT: 13.2 % (ref 11.5–14.5)
FLUAV RNA RESP QL NAA+PROBE: NOT DETECTED
FLUBV RNA RESP QL NAA+PROBE: NOT DETECTED
GLUCOSE SERPL-MCNC: 113 MG/DL (ref 74–99)
GLUCOSE UR STRIP.AUTO-MCNC: NORMAL MG/DL
HCT VFR BLD AUTO: 36.4 % (ref 41–52)
HGB BLD-MCNC: 12.4 G/DL (ref 13.5–17.5)
HYALINE CASTS #/AREA URNS AUTO: ABNORMAL /LPF
IMM GRANULOCYTES # BLD AUTO: 0.05 X10*3/UL (ref 0–0.5)
IMM GRANULOCYTES NFR BLD AUTO: 0.8 % (ref 0–0.9)
KETONES UR STRIP.AUTO-MCNC: NEGATIVE MG/DL
LEUKOCYTE ESTERASE UR QL STRIP.AUTO: NEGATIVE
LYMPHOCYTES # BLD MANUAL: 0.75 X10*3/UL (ref 0.8–3)
LYMPHOCYTES NFR BLD MANUAL: 11.5 %
MCH RBC QN AUTO: 31.4 PG (ref 26–34)
MCHC RBC AUTO-ENTMCNC: 34.1 G/DL (ref 32–36)
MCV RBC AUTO: 92 FL (ref 80–100)
MONOCYTES # BLD MANUAL: 0.94 X10*3/UL (ref 0.05–0.8)
MONOCYTES NFR BLD MANUAL: 14.5 %
MUCOUS THREADS #/AREA URNS AUTO: ABNORMAL /LPF
NEUTROPHILS # BLD MANUAL: 4.78 X10*3/UL (ref 1.6–5.5)
NEUTS BAND # BLD MANUAL: 0.03 X10*3/UL (ref 0–0.5)
NEUTS BAND NFR BLD MANUAL: 0.5 %
NEUTS SEG # BLD MANUAL: 4.75 X10*3/UL (ref 1.6–5)
NEUTS SEG NFR BLD MANUAL: 73 %
NITRITE UR QL STRIP.AUTO: NEGATIVE
NRBC BLD-RTO: 0 /100 WBCS (ref 0–0)
PH UR STRIP.AUTO: 6 [PH]
PLATELET # BLD AUTO: 129 X10*3/UL (ref 150–450)
POTASSIUM SERPL-SCNC: 3.6 MMOL/L (ref 3.5–5.3)
PROT SERPL-MCNC: 6.8 G/DL (ref 6.4–8.2)
PROT UR STRIP.AUTO-MCNC: ABNORMAL MG/DL
RBC # BLD AUTO: 3.95 X10*6/UL (ref 4.5–5.9)
RBC # UR STRIP.AUTO: NEGATIVE /UL
RBC #/AREA URNS AUTO: ABNORMAL /HPF
RBC MORPH BLD: ABNORMAL
RSV RNA RESP QL NAA+PROBE: NOT DETECTED
SARS-COV-2 RNA RESP QL NAA+PROBE: DETECTED
SODIUM SERPL-SCNC: 140 MMOL/L (ref 136–145)
SP GR UR STRIP.AUTO: 1.01
TOTAL CELLS COUNTED BLD: 100
UROBILINOGEN UR STRIP.AUTO-MCNC: ABNORMAL MG/DL
VARIANT LYMPHS # BLD MANUAL: 0.03 X10*3/UL (ref 0–0.3)
VARIANT LYMPHS NFR BLD: 0.5 %
WBC # BLD AUTO: 6.5 X10*3/UL (ref 4.4–11.3)
WBC #/AREA URNS AUTO: ABNORMAL /HPF

## 2024-09-07 PROCEDURE — 99283 EMERGENCY DEPT VISIT LOW MDM: CPT

## 2024-09-07 PROCEDURE — 87637 SARSCOV2&INF A&B&RSV AMP PRB: CPT | Performed by: PHYSICIAN ASSISTANT

## 2024-09-07 PROCEDURE — 71046 X-RAY EXAM CHEST 2 VIEWS: CPT

## 2024-09-07 PROCEDURE — 85027 COMPLETE CBC AUTOMATED: CPT | Performed by: EMERGENCY MEDICINE

## 2024-09-07 PROCEDURE — 36415 COLL VENOUS BLD VENIPUNCTURE: CPT | Performed by: EMERGENCY MEDICINE

## 2024-09-07 PROCEDURE — 71046 X-RAY EXAM CHEST 2 VIEWS: CPT | Performed by: RADIOLOGY

## 2024-09-07 PROCEDURE — 81001 URINALYSIS AUTO W/SCOPE: CPT | Performed by: EMERGENCY MEDICINE

## 2024-09-07 PROCEDURE — 84075 ASSAY ALKALINE PHOSPHATASE: CPT | Performed by: EMERGENCY MEDICINE

## 2024-09-07 PROCEDURE — 85007 BL SMEAR W/DIFF WBC COUNT: CPT | Performed by: EMERGENCY MEDICINE

## 2024-09-07 RX ORDER — ACETAMINOPHEN 325 MG/1
975 TABLET ORAL ONCE
Status: COMPLETED | OUTPATIENT
Start: 2024-09-07 | End: 2024-09-07

## 2024-09-07 RX ADMIN — ACETAMINOPHEN 975 MG: 325 TABLET ORAL at 14:33

## 2024-09-07 ASSESSMENT — COLUMBIA-SUICIDE SEVERITY RATING SCALE - C-SSRS
6. HAVE YOU EVER DONE ANYTHING, STARTED TO DO ANYTHING, OR PREPARED TO DO ANYTHING TO END YOUR LIFE?: NO
1. IN THE PAST MONTH, HAVE YOU WISHED YOU WERE DEAD OR WISHED YOU COULD GO TO SLEEP AND NOT WAKE UP?: NO
2. HAVE YOU ACTUALLY HAD ANY THOUGHTS OF KILLING YOURSELF?: NO

## 2024-09-07 ASSESSMENT — PAIN SCALES - GENERAL
PAINLEVEL_OUTOF10: 7
PAINLEVEL_OUTOF10: 7

## 2024-09-07 ASSESSMENT — PAIN - FUNCTIONAL ASSESSMENT: PAIN_FUNCTIONAL_ASSESSMENT: 0-10

## 2024-09-07 ASSESSMENT — PAIN DESCRIPTION - LOCATION: LOCATION: BACK

## 2024-09-07 NOTE — ED PROVIDER NOTES
Limitations to History: None  Additional History Obtained from: caregiver    HPI:    Patient is presenting to the emergency department due to feeling ill for 2 days.  Patient states that he has had a cold with a sore throat, fevers, runny nose and a cough for the last 2 days.  He states he has chronic back pain in his upper back which has been exacerbated by his coughing.  He has been taking Motrin at home without significant relief in his symptoms.  Denies any nausea, vomiting or diarrhea.  Denies any changes in his urination.  States he has chronic lower extremity edema left greater than right is unchanged.  Denies any chest discomfort.  Denies any sick contacts or recent travel.  His review of systems is otherwise negative.    ------------------------------------------------------------------------------------------------------------------------------------------  Physical Exam:    Vitals:    09/07/24 1715   BP: 110/66   Pulse: 79   Resp: 17   Temp: 37.7 °C (99.9 °F)   SpO2: 98%          VS: As documented in the triage note and EMR flowsheet from this visit were reviewed.  General: Well appearing. No acute distress.   Eyes: Pupils round and reactive. No scleral icterus. No conjunctival injection  HENT: Atraumatic. Normocephalic. Moist mucous membranes. Trachea midline, no tonsillar swelling or exudates  CV: RRR, No MRG.  1+ lower extremity edema bilaterally resp: Clear to auscultation bilaterally. Non-labored.    GI: Soft, nontender to palpation. Nondistended. No guarding, rigidity or rebound  Skin: Warm, dry, intact. No systemic rashes or lesions appreciated.  Extremities: No deformities or pain out of proportion; pulses intact   Neuro: Alert. No focal motor or sensory deficits observed. Speech fluent. Answers questions appropriately.  Slow ambulation in his baseline per caregiver at the bedside  Psych: Appropriate.  Shayla.    ------------------------------------------------------------------------------------------------------------------------------------------    Medical Decision Making  Patient is presenting to the emergency department due to a fever and cold symptoms.  On exam the patient is awake and alert, otherwise well-appearing.  Hemodynamically noted to have a fever.  Otherwise well-perfused without signs of decreased perfusion.  Will plan infectious evaluation with chest x-ray, urinalysis and respiratory cultures.  Will provide Tylenol for an antipyretic and reevaluate.  Caregiver at the bedside denying any other complaints or concerns.  Review of systems was otherwise negative.      External Records Reviewed: I reviewed recent and relevant outside records including: HIE/Community Record  Escalation of Care: Appropriate for Discharge per ED course/MDM  Social Determinants Affecting Care:Multiple chronic illnesses  Prescription Drug Consideration: per orders  Diagnostic testing considered: per orders  Discussion of Management with Other Providers: I discussed the patient/results with: caregiver    Objective Data  I have independently interpreted the following labs, imaging studies and MDM added to ED Course  Labs Reviewed   SARS-COV-2 PCR - Abnormal       Result Value    Coronavirus 2019, PCR Detected (*)     Narrative:     This assay has received FDA Emergency Use Authorization (EUA) and is only authorized for the duration of time that circumstances exist to justify the authorization of the emergency use of in vitro diagnostic tests for the detection of SARS-CoV-2 virus and/or diagnosis of COVID-19 infection under section 564(b)(1) of the Act, 21 U.S.C. 360bbb-3(b)(1). This assay is an in vitro diagnostic nucleic acid amplification test for the qualitative detection of SARS-CoV-2 from nasopharyngeal specimens and has been validated for use at [facility]. Negative results do not preclude  COVID-19 infections and should not be used as the sole basis for diagnosis, treatment, or other management decisions.     CBC WITH AUTO DIFFERENTIAL - Abnormal    WBC 6.5      nRBC 0.0      RBC 3.95 (*)     Hemoglobin 12.4 (*)     Hematocrit 36.4 (*)     MCV 92      MCH 31.4      MCHC 34.1      RDW 13.2      Platelets 129 (*)     Immature Granulocytes %, Automated 0.8      Immature Granulocytes Absolute, Automated 0.05      Narrative:     The previously reported component Neutrophils % is no longer being reported.  The previously reported component Lymphocytes % is no longer being reported.  The previously reported component Monocytes % is no longer being reported.  The previously   reported component Eosinophils % is no longer being reported.  The previously reported component Basophils % is no longer being reported.  The previously reported component Absolute Neutrophils is no longer being reported.  The previously reported   component Absolute Lymphocytes is no longer being reported.  The previously reported component Absolute Monocytes is no longer being reported.  The previously reported component Absolute Eosinophils is no longer being reported.  The previously reported   component Absolute Basophils is no longer being reported.   COMPREHENSIVE METABOLIC PANEL - Abnormal    Glucose 113 (*)     Sodium 140      Potassium 3.6      Chloride 102      Bicarbonate 31      Anion Gap 11      Urea Nitrogen 11      Creatinine 1.09      eGFR 69      Calcium 8.9      Albumin 3.9      Alkaline Phosphatase 57      Total Protein 6.8      AST 16      Bilirubin, Total 1.4 (*)     ALT 8 (*)    URINALYSIS WITH REFLEX CULTURE AND MICROSCOPIC - Abnormal    Color, Urine Yellow      Appearance, Urine Clear      Specific Gravity, Urine 1.013      pH, Urine 6.0      Protein, Urine 10 (TRACE)      Glucose, Urine Normal      Blood, Urine NEGATIVE      Ketones, Urine NEGATIVE      Bilirubin, Urine NEGATIVE      Urobilinogen, Urine 2 (1+)  (*)     Nitrite, Urine NEGATIVE      Leukocyte Esterase, Urine NEGATIVE     URINALYSIS MICROSCOPIC WITH REFLEX CULTURE - Abnormal    WBC, Urine NONE      RBC, Urine 1-2      Mucus, Urine FEW      Hyaline Casts, Urine 1+ (*)    MANUAL DIFFERENTIAL - Abnormal    Neutrophils %, Manual 73.0      Bands %, Manual 0.5      Lymphocytes %, Manual 11.5      Monocytes %, Manual 14.5      Eosinophils %, Manual 0.0      Basophils %, Manual 0.0      Atypical Lymphocytes %, Manual 0.5      Seg Neutrophils Absolute, Manual 4.75      Bands Absolute, Manual 0.03      Lymphocytes Absolute, Manual 0.75 (*)     Monocytes Absolute, Manual 0.94 (*)     Eosinophils Absolute, Manual 0.00      Basophils Absolute, Manual 0.00      Atypical Lymphs Absolute, Manual 0.03      Total Cells Counted 100      Neutrophils Absolute, Manual 4.78      RBC Morphology No significant RBC morphology present     INFLUENZA A AND B PCR - Normal    Flu A Result Not Detected      Flu B Result Not Detected      Narrative:     This assay is an in vitro diagnostic multiplex nucleic acid amplification test for the detection and discrimination of Influenza A & B from nasopharyngeal specimens, and has been validated for use at [facility]. Negative results do not preclude Influenza A/B infections, and should not be used as the sole basis for diagnosis, treatment, or other management decisions. If Influenza A/B and RSV PCR results are negative, testing for Parainfluenza virus, Adenovirus and Metapneumovirus is routinely performed for [facility] pediatric oncology and intensive care inpatients, and is available on other patients by placing an add-on request.   RSV PCR - Normal    RSV PCR Not Detected      Narrative:     This assay is an FDA-cleared, in vitro diagnostic nucleic acid amplification test for the detection of RSV from nasopharyngeal specimens, and has been validated for use at [facility]. Negative results do not  preclude RSV infections, and should not be used as the sole basis for diagnosis, treatment, or other management decisions. If Influenza A/B and RSV PCR results are negative, testing for Parainfluenza virus, Adenovirus and Metapneumovirus is routinely performed for pediatric oncology and intensive care inpatients at [facility], and is available on other patients by placing an add-on request.       URINALYSIS WITH REFLEX CULTURE AND MICROSCOPIC    Narrative:     The following orders were created for panel order Urinalysis with Reflex Culture and Microscopic.  Procedure                               Abnormality         Status                     ---------                               -----------         ------                     Urinalysis with Reflex C...[[id number]]  Abnormal            Final result               Extra Urine Gray Tube[[id number]]                            Final result                 Please view results for these tests on the individual orders.   EXTRA URINE GRAY TUBE    Extra Tube Hold for add-ons.         XR chest 2 views   Final Result   No sign of acute cardiopulmonary disease.             MACRO:   None        Signed by: Ramy Gamez 9/7/2024 2:24 PM   Dictation workstation:   [id number]          ED Course  ED Course as of 09/09/24 1334   Sat Sep 07, 2024   1653 , Chest x-ray negative for acute findings Urinalysis negative, CBC remarkable for mild anemia no significant change from previous COVID swab positive consistent with the patient's symptoms.  X-ray negative for acute findings.  Will reevaluate for disposition. [LP]   1140 Patient updated with findings, remains hemodynamically otherwise well-appearing, no signs of increased work of breathing.  Stable for discharge home with outpatient management.  Due to drug interactions we will hold on Paxil bid.  Patient not qualifying for steroids at this time will discharge home with supportive management, given return precautions and quarantine  recommendations. [LP]      ED Course User Index  [LP] Madelyn Burnett DO         Diagnoses as of 09/09/24 1334   COVID       Procedure  Procedures    Disposition: discharged    Madelyn Burnett DO  Emergency Medicine  Medical Toxicology     Madelyn Burnett DO  09/09/24 1331

## 2024-09-07 NOTE — ED TRIAGE NOTES
Pt to ED for fever, cough and runny nose. Pt's family member states he seems a little weak. Pt states he is also having back pain. Pt is awake, and ambulatory with stable vitals and ABCs in tact.

## 2024-09-08 LAB — HOLD SPECIMEN: NORMAL

## 2024-09-14 ENCOUNTER — HOSPITAL ENCOUNTER (EMERGENCY)
Facility: HOSPITAL | Age: 79
Discharge: HOME | End: 2024-09-14
Payer: MEDICARE

## 2024-09-14 VITALS
BODY MASS INDEX: 26.53 KG/M2 | SYSTOLIC BLOOD PRESSURE: 158 MMHG | HEART RATE: 54 BPM | TEMPERATURE: 97.6 F | HEIGHT: 67 IN | WEIGHT: 169 LBS | DIASTOLIC BLOOD PRESSURE: 83 MMHG | RESPIRATION RATE: 16 BRPM | OXYGEN SATURATION: 100 %

## 2024-09-14 DIAGNOSIS — L98.9 SKIN LESION OF RIGHT UPPER EXTREMITY: Primary | ICD-10-CM

## 2024-09-14 PROCEDURE — 99283 EMERGENCY DEPT VISIT LOW MDM: CPT

## 2024-09-14 RX ORDER — MUPIROCIN 20 MG/G
OINTMENT TOPICAL
Qty: 22 G | Refills: 0 | Status: SHIPPED | OUTPATIENT
Start: 2024-09-14 | End: 2024-09-24

## 2024-09-14 RX ORDER — DOXYCYCLINE 100 MG/1
100 CAPSULE ORAL 2 TIMES DAILY
Qty: 20 CAPSULE | Refills: 0 | Status: SHIPPED | OUTPATIENT
Start: 2024-09-14 | End: 2024-09-24

## 2024-09-14 ASSESSMENT — COLUMBIA-SUICIDE SEVERITY RATING SCALE - C-SSRS
2. HAVE YOU ACTUALLY HAD ANY THOUGHTS OF KILLING YOURSELF?: NO
1. IN THE PAST MONTH, HAVE YOU WISHED YOU WERE DEAD OR WISHED YOU COULD GO TO SLEEP AND NOT WAKE UP?: NO
6. HAVE YOU EVER DONE ANYTHING, STARTED TO DO ANYTHING, OR PREPARED TO DO ANYTHING TO END YOUR LIFE?: NO

## 2024-09-14 ASSESSMENT — PAIN DESCRIPTION - ORIENTATION: ORIENTATION: RIGHT;UPPER

## 2024-09-14 ASSESSMENT — PAIN DESCRIPTION - DESCRIPTORS: DESCRIPTORS: SORE

## 2024-09-14 ASSESSMENT — PAIN SCALES - GENERAL: PAINLEVEL_OUTOF10: 3

## 2024-09-14 ASSESSMENT — PAIN - FUNCTIONAL ASSESSMENT: PAIN_FUNCTIONAL_ASSESSMENT: 0-10

## 2024-09-14 ASSESSMENT — PAIN DESCRIPTION - LOCATION: LOCATION: ARM

## 2024-09-14 ASSESSMENT — PAIN DESCRIPTION - ONSET: ONSET: GRADUAL

## 2024-09-14 ASSESSMENT — PAIN DESCRIPTION - FREQUENCY: FREQUENCY: CONSTANT/CONTINUOUS

## 2024-09-14 ASSESSMENT — PAIN DESCRIPTION - PROGRESSION: CLINICAL_PROGRESSION: NOT CHANGED

## 2024-09-14 NOTE — ED TRIAGE NOTES
PT from home A&OX4 complaining of sore on upper right arm. PT recently seen for Covid on 09/07/24. PT states having mild pain, wants it checked out. States having some puss, no itching or redness.

## 2024-09-14 NOTE — DISCHARGE INSTRUCTIONS
Follow-up with your primary care physician to reevaluate these lesions.  Course of antibiotics.  If lesions do not resolve you may need to see a dermatologist referral provided.

## 2024-09-14 NOTE — ED PROVIDER NOTES
HPI   Chief Complaint   Patient presents with    Sore     Right arm       79-year-old male presents with skin sores right upper arm.  Has had drainage from the area.  No fever.  No pain.  No trauma or injury.  Recently had COVID.  Patient reports COVID symptom improvement.  No respiratory difficulty or increased work of breathing.  No fever.      History provided by:  Patient   used: No            Patient History   Past Medical History:   Diagnosis Date    Acquired spondylolisthesis     Arthritis     BPH with obstruction/lower urinary tract symptoms     Chronic constipation     Foreskin problem     Gait instability     H/O echocardiogram 02/24/2023    CONCLUSIONS: 1. Left ventricular systolic function is normal with a 55-60% estimated ejection fraction. 2. No left ventricular thrombus visualized. 3. RVSP within normal limits. 4. There is plaque visualized in the ascending aorta.    Hyperlipidemia     Hypertension     Overweight     Paget's bone disease     Parkinson disease (Multi)     Phimosis     PVD (peripheral vascular disease) (CMS-HCC)     Tremor     Venous insufficiency      Past Surgical History:   Procedure Laterality Date    PROSTATE SURGERY  05/13/2022     Family History   Problem Relation Name Age of Onset    No Known Problems Mother      No Known Problems Father      Heart disease Sister       Social History     Tobacco Use    Smoking status: Never    Smokeless tobacco: Never   Vaping Use    Vaping status: Never Used   Substance Use Topics    Alcohol use: Not Currently     Alcohol/week: 1.0 standard drink of alcohol     Types: 1 Standard drinks or equivalent per week    Drug use: Never       Physical Exam   ED Triage Vitals [09/14/24 1630]   Temperature Heart Rate Respirations BP   36.4 °C (97.6 °F) 61 18 149/73      Pulse Ox Temp src Heart Rate Source Patient Position   97 % -- -- Sitting      BP Location FiO2 (%)     Left arm --       Physical Exam  Vitals and nursing note  reviewed.   Constitutional:       General: He is not in acute distress.     Appearance: Normal appearance. He is normal weight. He is not ill-appearing, toxic-appearing or diaphoretic.   HENT:      Head: Normocephalic and atraumatic.   Eyes:      Extraocular Movements: Extraocular movements intact.      Conjunctiva/sclera: Conjunctivae normal.      Pupils: Pupils are equal, round, and reactive to light.   Cardiovascular:      Rate and Rhythm: Normal rate.   Pulmonary:      Effort: Pulmonary effort is normal.   Musculoskeletal:         General: Normal range of motion.      Cervical back: Normal range of motion.   Skin:     Capillary Refill: Capillary refill takes less than 2 seconds.      Findings: Lesion present. No erythema.      Comments: Posterior right upper arm with 3 papular-vesicular lesions.  1 lesion is open without active purulent drainage.  No surrounding erythema induration or crepitus.  No soft tissue tenderness.  No areas of fluctuance for I&D.   Neurological:      General: No focal deficit present.      Mental Status: He is alert and oriented to person, place, and time.   Psychiatric:         Mood and Affect: Mood normal.         Behavior: Behavior normal.         Thought Content: Thought content normal.         Judgment: Judgment normal.           ED Course & MDM   Diagnoses as of 09/14/24 7387   Skin lesion of right upper extremity                 No data recorded     [place] Coma Scale Score: 15 (09/14/24 1633 : Cameron Parker, EMT)                           Medical Decision Making  Evaluation consistent with localized cutaneous lesion without fluctuance.  1 area already open patient reports purulent drainage.  No surrounding tenderness erythema induration or crepitus.  Localized skin infection.  Prescribed mupirocin ointment and doxycycline.  Stable for discharge outpatient management follow-up.  Follow-up PCP.  Patient provided with dermatology referral if the lesions  persist.    Risk  Prescription drug management.        Procedure  Procedures     Dino Gu PA-C  09/14/24 5464

## 2024-09-17 DIAGNOSIS — R60.0 PEDAL EDEMA: ICD-10-CM

## 2024-09-17 DIAGNOSIS — I10 BENIGN ESSENTIAL HYPERTENSION: ICD-10-CM

## 2024-09-17 RX ORDER — FUROSEMIDE 40 MG/1
40 TABLET ORAL DAILY
Qty: 90 TABLET | Refills: 0 | Status: SHIPPED | OUTPATIENT
Start: 2024-09-17 | End: 2024-12-16

## 2024-09-19 ENCOUNTER — APPOINTMENT (OUTPATIENT)
Dept: PRIMARY CARE | Facility: CLINIC | Age: 79
End: 2024-09-19
Payer: MEDICARE

## 2024-10-10 ENCOUNTER — APPOINTMENT (OUTPATIENT)
Dept: PRIMARY CARE | Facility: CLINIC | Age: 79
End: 2024-10-10
Payer: MEDICARE

## 2024-10-10 VITALS
SYSTOLIC BLOOD PRESSURE: 130 MMHG | HEIGHT: 67 IN | WEIGHT: 167 LBS | HEART RATE: 64 BPM | DIASTOLIC BLOOD PRESSURE: 70 MMHG | BODY MASS INDEX: 26.21 KG/M2 | RESPIRATION RATE: 16 BRPM

## 2024-10-10 DIAGNOSIS — Z23 ENCOUNTER FOR IMMUNIZATION: Primary | ICD-10-CM

## 2024-10-10 DIAGNOSIS — G20.B1 PARKINSON'S DISEASE WITH DYSKINESIA, UNSPECIFIED WHETHER MANIFESTATIONS FLUCTUATE: ICD-10-CM

## 2024-10-10 DIAGNOSIS — I10 BENIGN ESSENTIAL HYPERTENSION: ICD-10-CM

## 2024-10-10 DIAGNOSIS — E78.49 OTHER HYPERLIPIDEMIA: ICD-10-CM

## 2024-10-10 PROCEDURE — 1160F RVW MEDS BY RX/DR IN RCRD: CPT | Performed by: INTERNAL MEDICINE

## 2024-10-10 PROCEDURE — 90662 IIV NO PRSV INCREASED AG IM: CPT | Performed by: INTERNAL MEDICINE

## 2024-10-10 PROCEDURE — 3078F DIAST BP <80 MM HG: CPT | Performed by: INTERNAL MEDICINE

## 2024-10-10 PROCEDURE — 1159F MED LIST DOCD IN RCRD: CPT | Performed by: INTERNAL MEDICINE

## 2024-10-10 PROCEDURE — 99214 OFFICE O/P EST MOD 30 MIN: CPT | Performed by: INTERNAL MEDICINE

## 2024-10-10 PROCEDURE — G0008 ADMIN INFLUENZA VIRUS VAC: HCPCS | Performed by: INTERNAL MEDICINE

## 2024-10-10 PROCEDURE — 1036F TOBACCO NON-USER: CPT | Performed by: INTERNAL MEDICINE

## 2024-10-10 PROCEDURE — 3075F SYST BP GE 130 - 139MM HG: CPT | Performed by: INTERNAL MEDICINE

## 2024-10-10 NOTE — PROGRESS NOTES
Yaniv Bearden is a 79 y.o. male   Patient with a with history of hypertension, hyperlipidemia, Lumbar Spondylosis, Parkinson's disease, BPH with LUTS s/p HoLEP, Pedal edema, Padget's disease, Glaucoma    Patient had COVID last month  Recovered well  Did have low platelets during COVID with slightly elevated bilirubin  Has a blister develop soon after  Did see dermatology, and will follow up on that      Feels fine  No chest pain/  SOB/ dizziness  BM OK  Energy level ok  Appetite OK             Review of Systems     Constitutional: no fever, no chills, not feeling poorly, not feeling tired and no recent weight gain, no recent weight loss.   ENT: no earache, no hearing loss, no nosebleeds, no nasal discharge, no sore throat and no hoarseness.   Cardiovascular: the heart rate was not slow, the heart rate was not fast, no chest pain, no palpitations, no intermittent leg claudication and no lower extremity edema.   Respiratory: no cough, wheezing or shortness of breath at rest or exertion  Gastrointestinal: no abdominal pain, no constipation, no melena, no nausea, no diarrhea, no vomiting and no blood in stools.   Musculoskeletal: no arthralgias, no myalgias, no back pain, no joint swelling, no joint stiffness, no limb pain and no limb swelling.   Integumentary: no skin rashes, no skin lesions, no itching, no skin wound and no dry skin.   Neurological: no headache, no confusion, no numbness, no dizziness, no tingling and no fainting.   All other systems have been reviewed and are negative for complaint.     Current Outpatient Medications   Medication Instructions    acetaminophen (TYLENOL) 650 mg, oral, Every 6 hours PRN    bacitracin 500 unit/gram ointment Topical, 2 times daily    carbidopa-levodopa (Sinemet)  mg tablet 1 tablet, oral, 3 times daily, Take one tablet 3 times daily at 12 PM, 4 PM, 8 PM.    dorzolamide-timoloL (Cosopt) 22.3-6.8 mg/mL ophthalmic solution Dorzolamide HCl-Timolol Mal 22.3-6.8 MG/ML  Ophthalmic Solution   Quantity: 10  Refills: 0        Start : 2-Mar-2021   Active    furosemide (LASIX) 40 mg, oral, Daily    ibuprofen 600 mg, oral, Every 6 hours PRN    latanoprost (Xalatan) 0.005 % ophthalmic solution 1 drop, Both Eyes, Nightly    losartan (COZAAR) 50 mg, oral, Daily    multivitamin tablet 1 tablet, oral, Daily    oxyCODONE (ROXICODONE) 5 mg, oral, Every 6 hours PRN    simvastatin (ZOCOR) 40 mg, oral, Nightly         Vitals:    10/10/24 1552   BP: 130/70   Pulse: 64   Resp: 16        Physical Exam     Constitutional   General appearance: Alert and in no acute distress.     Pulmonary   Respiratory assessment: No respiratory distress, normal respiratory rhythm and effort.    Auscultation of Lungs: Clear bilateral breath sounds.   Cardiovascular   Auscultation of heart: Apical pulse normal, heart rate and rhythm normal, normal S1 and S2, no murmurs and no pericardial rub.    Exam for edema: No peripheral edema.   Abdomen   Abdominal Exam: No bruits, normal bowel sounds, soft, non-tender, no abdominal mass palpated.    Liver and Spleen exam: No hepato-splenomegaly.   Musculoskeletal   Examination of gait: stiff gait  Inspection of digits and nails: No clubbing or cyanosis of the fingernails.    Inspection/palpation of joints, bones and muscles: No joint swelling.    Skin   Skin inspection: Normal skin color and pigmentation, normal skin turgor and no visible rash.   Neurologic   Cranial nerves: Nerves 2-12 were intact, no focal neuro defects.    Assessment/Plan          Patient with a with history of hypertension, hyperlipidemia, Lumbar Spondylosis, Parkinson's disease, BPH with LUTS s/p HoLEP, Pedal edema, Padget's disease, Glaucoma    # Skin lesion  Small blisters  Seeing Dermatology  No biopsy done (yet)     # HTN/ Pedal edema  Stable  Continue current medications       # Parkinson's Disease  Continue meds  Management as per  Dr Ramos     # HLD  Stable  Continue current medications  Watch salt,  avoid excessive caffeine  Avoid processed meats/ sugars/juices Instead eat fresh fruit  Add walnuts and almonds to your diet  exercise 6 days a week for 30 minutes     #Benign prostatic hypertrophy with urine retention  Phimosis s/p circumcision  Being managed by urology     # Inguinal hernia  Saw surgery and was offered robotic repair  But wants to hold off

## 2024-10-22 NOTE — PROGRESS NOTES
HPI    79 y.o. male being seen with the following problem list:    Problem list:  BPH/retention s/p HoLEP 5/2022 with Dr. Johnson  Phimosis, s/p circumcision 6/7/24     Patient was in urinary retention with a prostate volume of 138 cmÂ³. He underwent an uneventful holep on May 28, 2022. He developed significant hematuria in the postoperative area and was taken back for clot evacuation and fulguration.  He passed a voiding trial on May 23, 2022. Pathology showed 47.6 g of benign prostatic tissue     Last seen by Dr. johnson 6/10/22. good stream, no leakage, PVR 0ml     9/20/22 - comes in for follow up. Voiding well. Has some baseline urgency but no incontinence. Having issues retracting his foreskin.     11/15/22- Foreskin is manageable, has been using the cream. Voiding well. Urgency is minimal. Would not like anything done at this point.      11/10/23 - seen today for 1yr fuv with PVR. No urinary issues. PVR 1cc. Foreskin problem resolved. Notes longstanding ED, naive to meds. Has PD. No nitrates     3/20/24 - seen via thv for 3mo medication response to Sildenafil 100mg po prn. He currently has the flu. He reports that the sildenafil is helping with his erections. He is still having trouble with his foreskin, would like to come in to discuss this further in June/July 4/26/24 - notes more issues with his foreskin. Can retract, but very redundant and hard to pull back down, gets in the way, would like a circumcision. Also notes a bothersome bulge in his R groin.     6/7/24 - circumcision     7/11/24 - seen today for POV.  Has done well since circumcision.  Last week was walking down some stairs and he had a twinge of pain on the left side of his penis.     7/19/24 - seen today for fuv, exam. Overall doing well    10/23/24 - Voiding well, no urinary issues or concerns. No soreness now.       Lab Results   Component Value Date    PSA 0.69 02/24/2023    PSA 6.02 (H) 02/17/2022              Current Medications:  Current  Outpatient Medications   Medication Sig Dispense Refill    acetaminophen (Tylenol) 325 mg tablet Take 2 tablets (650 mg) by mouth every 6 hours if needed for mild pain (1 - 3). 30 tablet 0    bacitracin 500 unit/gram ointment Apply topically 2 times a day. (Patient not taking: Reported on 7/11/2024) 28 g 0    carbidopa-levodopa (Sinemet)  mg tablet Take 1 tablet by mouth 3 times a day. Take one tablet 3 times daily at 12 PM, 4 PM, 8 PM. 270 tablet 3    dorzolamide-timoloL (Cosopt) 22.3-6.8 mg/mL ophthalmic solution Dorzolamide HCl-Timolol Mal 22.3-6.8 MG/ML Ophthalmic Solution   Quantity: 10  Refills: 0        Start : 2-Mar-2021   Active      furosemide (Lasix) 40 mg tablet Take 1 tablet (40 mg) by mouth once daily. 90 tablet 0    ibuprofen 600 mg tablet Take 1 tablet (600 mg) by mouth every 6 hours if needed for mild pain (1 - 3). 30 tablet 0    latanoprost (Xalatan) 0.005 % ophthalmic solution Administer 1 drop into both eyes once daily at bedtime.      losartan (Cozaar) 50 mg tablet Take 1 tablet (50 mg) by mouth once daily. 90 tablet 0    multivitamin tablet Take 1 tablet by mouth once daily.      oxyCODONE (Roxicodone) 5 mg immediate release tablet Take 1 tablet (5 mg) by mouth every 6 hours if needed for severe pain (7 - 10). (Patient not taking: Reported on 7/11/2024) 5 tablet 0    simvastatin (Zocor) 40 mg tablet Take 1 tablet (40 mg) by mouth once daily at bedtime. 60 tablet 1     No current facility-administered medications for this visit.        Active Problems:  Yaniv Bearden is a 79 y.o. male with the following Problems and Medications.  Patient Active Problem List   Diagnosis    Acquired spondylolisthesis    Back pain    Benign essential hypertension    BPH with obstruction/lower urinary tract symptoms    Calf swelling    Enlarged prostate without lower urinary tract symptoms (luts)    Frequent urination    Gait instability    Hematuria    Paget's bone disease    Venous insufficiency    Urinary  tract infection    Urinary retention    Rotator cuff arthropathy    Right shoulder pain    Phimosis    Parkinson's disease with dyskinesia, unspecified whether manifestations fluctuate    Hyperlipemia    Pedal edema    PVD (peripheral vascular disease) (CMS-[facility])    Chronic constipation    HT (hammer toe)    Arthritis of foot    Ankle arthritis    Foreskin problem    Unilateral inguinal hernia without obstruction or gangrene    H/O of acquired phimosis in male     Current Outpatient Medications   Medication Sig Dispense Refill    acetaminophen (Tylenol) 325 mg tablet Take 2 tablets (650 mg) by mouth every 6 hours if needed for mild pain (1 - 3). 30 tablet 0    bacitracin 500 unit/gram ointment Apply topically 2 times a day. (Patient not taking: Reported on 7/11/2024) 28 g 0    carbidopa-levodopa (Sinemet)  mg tablet Take 1 tablet by mouth 3 times a day. Take one tablet 3 times daily at 12 PM, 4 PM, 8 PM. 270 tablet 3    dorzolamide-timoloL (Cosopt) 22.3-6.8 mg/mL ophthalmic solution Dorzolamide HCl-Timolol Mal 22.3-6.8 MG/ML Ophthalmic Solution   Quantity: 10  Refills: 0        Start : 2-Mar-2021   Active      furosemide (Lasix) 40 mg tablet Take 1 tablet (40 mg) by mouth once daily. 90 tablet 0    ibuprofen 600 mg tablet Take 1 tablet (600 mg) by mouth every 6 hours if needed for mild pain (1 - 3). 30 tablet 0    latanoprost (Xalatan) 0.005 % ophthalmic solution Administer 1 drop into both eyes once daily at bedtime.      losartan (Cozaar) 50 mg tablet Take 1 tablet (50 mg) by mouth once daily. 90 tablet 0    multivitamin tablet Take 1 tablet by mouth once daily.      oxyCODONE (Roxicodone) 5 mg immediate release tablet Take 1 tablet (5 mg) by mouth every 6 hours if needed for severe pain (7 - 10). (Patient not taking: Reported on 7/11/2024) 5 tablet 0    simvastatin (Zocor) 40 mg tablet Take 1 tablet (40 mg) by mouth once daily at bedtime. 60 tablet 1     No current facility-administered medications for this  visit.       PMH:  Past Medical History:   Diagnosis Date    Acquired spondylolisthesis     Arthritis     BPH with obstruction/lower urinary tract symptoms     Chronic constipation     Foreskin problem     Gait instability     H/O echocardiogram 02/24/2023    CONCLUSIONS: 1. Left ventricular systolic function is normal with a 55-60% estimated ejection fraction. 2. No left ventricular thrombus visualized. 3. RVSP within normal limits. 4. There is plaque visualized in the ascending aorta.    Hyperlipidemia     Hypertension     Overweight     Paget's bone disease     Parkinson disease (Multi)     Phimosis     PVD (peripheral vascular disease) (CMS-[facility])     Tremor     Venous insufficiency        PSH:  Past Surgical History:   Procedure Laterality Date    PROSTATE SURGERY  05/13/2022       FMH:  Family History   Problem Relation Name Age of Onset    No Known Problems Mother      No Known Problems Father      Heart disease Sister         SHx:  Social History     Tobacco Use    Smoking status: Never    Smokeless tobacco: Never   Vaping Use    Vaping status: Never Used   Substance Use Topics    Alcohol use: Not Currently     Alcohol/week: 1.0 standard drink of alcohol     Types: 1 Standard drinks or equivalent per week    Drug use: Never       Allergies:  No Known Allergies    Physical Exam:  : nicely healed circ.    Assessment/Plan  Healed circumcision, no soreness now. Offered to follow up as needed but he would like to follow up in 3 months.    Will follow up in 3 months     Scribe Attestation  By signing my name below, I, Lb Alexander, attest that this documentation  has been prepared under the direction and in the presence of Ramy Castle MD.

## 2024-10-23 ENCOUNTER — OFFICE VISIT (OUTPATIENT)
Dept: UROLOGY | Facility: HOSPITAL | Age: 79
End: 2024-10-23
Payer: MEDICARE

## 2024-10-23 DIAGNOSIS — N47.8 FORESKIN PROBLEM: Primary | ICD-10-CM

## 2024-10-23 PROCEDURE — G2211 COMPLEX E/M VISIT ADD ON: HCPCS | Performed by: UROLOGY

## 2024-10-23 PROCEDURE — 99213 OFFICE O/P EST LOW 20 MIN: CPT | Performed by: UROLOGY

## 2024-10-30 ENCOUNTER — TELEPHONE (OUTPATIENT)
Dept: NEUROLOGY | Facility: CLINIC | Age: 79
End: 2024-10-30
Payer: MEDICARE

## 2024-10-30 DIAGNOSIS — G20.A1 PARKINSON'S DISEASE WITHOUT DYSKINESIA OR FLUCTUATING MANIFESTATIONS: ICD-10-CM

## 2024-10-30 DIAGNOSIS — I10 BENIGN ESSENTIAL HYPERTENSION: ICD-10-CM

## 2024-10-30 DIAGNOSIS — R60.0 PEDAL EDEMA: ICD-10-CM

## 2024-10-30 RX ORDER — CARBIDOPA AND LEVODOPA 25; 100 MG/1; MG/1
1 TABLET ORAL 3 TIMES DAILY
Qty: 270 TABLET | Refills: 3 | Status: CANCELLED | OUTPATIENT
Start: 2024-10-30 | End: 2025-10-30

## 2024-10-30 NOTE — TELEPHONE ENCOUNTER
Monica called to request refill for Yaniv of Carbidopa/ Levodopa 25/100 1 TID to [facility] ( on file) #[phone] with 3 refills please.

## 2024-10-31 DIAGNOSIS — G20.A1 PARKINSON'S DISEASE WITHOUT DYSKINESIA OR FLUCTUATING MANIFESTATIONS: ICD-10-CM

## 2024-10-31 RX ORDER — CARBIDOPA AND LEVODOPA 25; 100 MG/1; MG/1
1 TABLET ORAL 3 TIMES DAILY
Qty: 270 TABLET | Refills: 3 | Status: SHIPPED | OUTPATIENT
Start: 2024-10-31 | End: 2025-10-31

## 2024-10-31 RX ORDER — FUROSEMIDE 40 MG/1
40 TABLET ORAL DAILY
Qty: 90 TABLET | Refills: 0 | Status: SHIPPED | OUTPATIENT
Start: 2024-10-31 | End: 2025-01-29

## 2024-10-31 RX ORDER — LOSARTAN POTASSIUM 50 MG/1
50 TABLET ORAL DAILY
Qty: 90 TABLET | Refills: 0 | Status: SHIPPED | OUTPATIENT
Start: 2024-10-31

## 2024-11-06 DIAGNOSIS — E78.5 HYPERLIPIDEMIA, UNSPECIFIED HYPERLIPIDEMIA TYPE: ICD-10-CM

## 2024-11-06 RX ORDER — SIMVASTATIN 40 MG/1
40 TABLET, FILM COATED ORAL NIGHTLY
Qty: 60 TABLET | Refills: 2 | Status: SHIPPED | OUTPATIENT
Start: 2024-11-06 | End: 2025-03-06

## 2025-01-14 ENCOUNTER — APPOINTMENT (OUTPATIENT)
Dept: PRIMARY CARE | Facility: CLINIC | Age: 80
End: 2025-01-14
Payer: MEDICARE

## 2025-01-14 VITALS
RESPIRATION RATE: 16 BRPM | WEIGHT: 171 LBS | BODY MASS INDEX: 26.78 KG/M2 | DIASTOLIC BLOOD PRESSURE: 60 MMHG | SYSTOLIC BLOOD PRESSURE: 140 MMHG | HEART RATE: 66 BPM

## 2025-01-14 DIAGNOSIS — E78.49 OTHER HYPERLIPIDEMIA: ICD-10-CM

## 2025-01-14 DIAGNOSIS — N40.1 BPH WITH OBSTRUCTION/LOWER URINARY TRACT SYMPTOMS: ICD-10-CM

## 2025-01-14 DIAGNOSIS — G20.A1 PARKINSON'S DISEASE WITHOUT DYSKINESIA OR FLUCTUATING MANIFESTATIONS: ICD-10-CM

## 2025-01-14 DIAGNOSIS — R60.0 PEDAL EDEMA: ICD-10-CM

## 2025-01-14 DIAGNOSIS — J32.9 CHRONIC SINUSITIS, UNSPECIFIED LOCATION: ICD-10-CM

## 2025-01-14 DIAGNOSIS — N13.8 BPH WITH OBSTRUCTION/LOWER URINARY TRACT SYMPTOMS: ICD-10-CM

## 2025-01-14 DIAGNOSIS — I10 BENIGN ESSENTIAL HYPERTENSION: Primary | ICD-10-CM

## 2025-01-14 DIAGNOSIS — G20.B1 PARKINSON'S DISEASE WITH DYSKINESIA, UNSPECIFIED WHETHER MANIFESTATIONS FLUCTUATE: ICD-10-CM

## 2025-01-14 PROCEDURE — G0439 PPPS, SUBSEQ VISIT: HCPCS | Performed by: INTERNAL MEDICINE

## 2025-01-14 PROCEDURE — 1159F MED LIST DOCD IN RCRD: CPT | Performed by: INTERNAL MEDICINE

## 2025-01-14 PROCEDURE — 1160F RVW MEDS BY RX/DR IN RCRD: CPT | Performed by: INTERNAL MEDICINE

## 2025-01-14 PROCEDURE — 99214 OFFICE O/P EST MOD 30 MIN: CPT | Performed by: INTERNAL MEDICINE

## 2025-01-14 PROCEDURE — 1123F ACP DISCUSS/DSCN MKR DOCD: CPT | Performed by: INTERNAL MEDICINE

## 2025-01-14 PROCEDURE — 3078F DIAST BP <80 MM HG: CPT | Performed by: INTERNAL MEDICINE

## 2025-01-14 PROCEDURE — 1158F ADVNC CARE PLAN TLK DOCD: CPT | Performed by: INTERNAL MEDICINE

## 2025-01-14 PROCEDURE — 1036F TOBACCO NON-USER: CPT | Performed by: INTERNAL MEDICINE

## 2025-01-14 PROCEDURE — G0444 DEPRESSION SCREEN ANNUAL: HCPCS | Performed by: INTERNAL MEDICINE

## 2025-01-14 PROCEDURE — 3077F SYST BP >= 140 MM HG: CPT | Performed by: INTERNAL MEDICINE

## 2025-01-14 RX ORDER — FUROSEMIDE 40 MG/1
40 TABLET ORAL DAILY
Qty: 90 TABLET | Refills: 0 | Status: SHIPPED | OUTPATIENT
Start: 2025-01-14 | End: 2025-04-14

## 2025-01-14 RX ORDER — CARBIDOPA AND LEVODOPA 25; 100 MG/1; MG/1
1 TABLET ORAL 3 TIMES DAILY
Qty: 270 TABLET | Refills: 3 | Status: SHIPPED | OUTPATIENT
Start: 2025-01-14 | End: 2026-01-14

## 2025-01-14 RX ORDER — FLUTICASONE PROPIONATE 50 MCG
1 SPRAY, SUSPENSION (ML) NASAL DAILY
Qty: 16 G | Refills: 11 | Status: SHIPPED | OUTPATIENT
Start: 2025-01-14 | End: 2026-01-14

## 2025-01-14 ASSESSMENT — ENCOUNTER SYMPTOMS
OCCASIONAL FEELINGS OF UNSTEADINESS: 0
DEPRESSION: 0
LOSS OF SENSATION IN FEET: 0

## 2025-01-14 NOTE — PROGRESS NOTES
"Yaniv Bearden is a 79 y.o. male here for a Medicare Wellness Exam.    No chief complaint on file.       Patient with a with history of hypertension, hyperlipidemia, Lumbar Spondylosis, Parkinson's disease, BPH with LUTS s/p HoLEP, Pedal edema, Padget's disease, Glaucoma    Has a runny nose x 2 weeks  Clear drainage  No cough/ fever  No sore throat    Also gets pain the right hip         Medicare Wellness Exam    The patent is being seen for a follow up annual wellness visit  Past Medical, Surgical and family History: Reviewed and updated in chart  Interval History: Patient has not been hospitalized previously  Medications and Supplements: Review of all medications by a prescribing practitioner or clinical pharmacist (such as prescriptions, OTC, Herbal therapies and supplements) documented in the medical record.    Patient Self-Assessment of health: Fair  Tobacco Use: No  Alcohol Use: No  Illicit drug use: No  Patient using opioids: No    Current Diet: in general, a \"healthy\" diet    Adequate fluid intake: Yes  Caffeine intake: Yes  Exercise frequency: moderately active    Depression/Suicide screening: PHQ2/ PHQ9 (see screenings tab)    Hearing impairment: No  Uses hearing aids N/A  Cognitive impairment Observation: Yes   Patient or family reported cognitive impairment: No    Bathing: independent  Dressing: independent  Walking: independent  Taking Medications: independent  Feeding: independent  Personal Hygiene: independent  Managing Finances: independent  Shopping: dependant  Housework/Basic Home Maintenance: with partial assistance  Handling transportation: dependant  Preparing meals: with partial assistance    Bowels: continent  Bladder: continent    Falls Risk: has notfallen in last 6 months.   Their fall has not resulted in an injury.   Fall risk Factors: Fall Risk Factors: Mobility Impairment mild   Care Plan Risk: Care Plan: High Risk: Regular physical activity such as walking, water aerobics or cristal chi to " improve strength, balance, coordination and flexibility. Wear appropriate, sensible shoe wear. Remove fall hazards at home such as loose rugs, obstacles, use non-slip surface in bath or shower. Keep living space well lit. Use assistive devices such as cane or walker if recommended and at home use handrails on stairs, grab bars for shower or tub, raised toilet seat and seat in the shower or tub.       Home Safety Risk Factors: Home Safety Risk Factors: None  Advanced Directives:  Living will: Yes POA: Yes    Patient's End of Life Decisions: Provider agree to follow.      Past Medical History:   Diagnosis Date    Acquired spondylolisthesis     Arthritis     BPH with obstruction/lower urinary tract symptoms     Chronic constipation     Foreskin problem     Gait instability     H/O echocardiogram 02/24/2023    CONCLUSIONS: 1. Left ventricular systolic function is normal with a 55-60% estimated ejection fraction. 2. No left ventricular thrombus visualized. 3. RVSP within normal limits. 4. There is plaque visualized in the ascending aorta.    Hyperlipidemia     Hypertension     Overweight     Paget's bone disease     Parkinson disease (Multi)     Phimosis     PVD (peripheral vascular disease) (CMS-[facility])     Tremor     Venous insufficiency         Current Outpatient Medications   Medication Instructions    acetaminophen (TYLENOL) 650 mg, oral, Every 6 hours PRN    bacitracin 500 unit/gram ointment Topical, 2 times daily    carbidopa-levodopa (Sinemet)  mg tablet 1 tablet, oral, 3 times daily, Take one tablet 3 times daily at 12 PM, 4 PM, 8 PM.    dorzolamide-timoloL (Cosopt) 22.3-6.8 mg/mL ophthalmic solution Dorzolamide HCl-Timolol Mal 22.3-6.8 MG/ML Ophthalmic Solution   Quantity: 10  Refills: 0        Start : 2-Mar-2021   Active    furosemide (LASIX) 40 mg, oral, Daily    ibuprofen 600 mg, oral, Every 6 hours PRN    latanoprost (Xalatan) 0.005 % ophthalmic solution 1 drop, Both Eyes, Nightly    losartan (COZAAR) 50  mg, oral, Daily    multivitamin tablet 1 tablet, oral, Daily    oxyCODONE (ROXICODONE) 5 mg, oral, Every 6 hours PRN    simvastatin (ZOCOR) 40 mg, oral, Nightly       Review of Systems     Constitutional: no fever, no chills, not feeling poorly, not feeling tired and no recent weight gain, no recent weight loss.   ENT: no earache, no hearing loss, no nosebleeds, no nasal discharge, no sore throat and no hoarseness.   Cardiovascular: the heart rate was not slow, the heart rate was not fast, no chest pain, no palpitations, no intermittent leg claudication and no lower extremity edema.   Respiratory: no cough, wheezing or shortness of breath at rest or exertion  Gastrointestinal: no abdominal pain, no constipation, no melena, no nausea, no diarrhea, no vomiting and no blood in stools.   Musculoskeletal: no arthralgias, no myalgias, no back pain, no joint swelling, no joint stiffness, no limb pain and no limb swelling.   Integumentary: no skin rashes, no skin lesions, no itching, no skin wound and no dry skin.   Neurological: no headache, no confusion, no numbness, no dizziness, no tingling and no fainting.   All other systems have been reviewed and are negative for complaint.        Physical Exam    Constitutional   General appearance: Alert and in no acute distress.     Pulmonary   Respiratory assessment: No respiratory distress, normal respiratory rhythm and effort.    Auscultation of Lungs: Clear bilateral breath sounds.   Cardiovascular   Auscultation of heart: Apical pulse normal, heart rate and rhythm normal, normal S1 and S2, no murmurs and no pericardial rub.    Exam for edema: No peripheral edema.   Abdomen   Abdominal Exam: No bruits, normal bowel sounds, soft, non-tender, no abdominal mass palpated.    Liver and Spleen exam: No hepato-splenomegaly.   Musculoskeletal   Examination of gait: slow shuffling gait  Skin   Skin inspection: Normal skin color and pigmentation, normal skin turgor and no visible rash.    Neurologic   Cranial nerves: Nerves 2-12 were intact, no focal neuro defects.       Assessment/Plan          Patient with a with history of hypertension, hyperlipidemia, Lumbar Spondylosis, Parkinson's disease, BPH with LUTS s/p HoLEP, Pedal edema, Padget's disease, Glaucoma          # HTN/ Pedal edema  Stable  Continue current medications        # Parkinson's Disease  Continue meds  Refill meds  Management as per  Dr Ramos     # HLD  Stable  Continue current medications  Watch salt, avoid excessive caffeine  Avoid processed meats/ sugars/juices Instead eat fresh fruit  Add walnuts and almonds to your diet  exercise 6 days a week for 30 minutes     #Benign prostatic hypertrophy with urine retention  Phimosis s/p circumcision  Being managed by urology     # Inguinal hernia  Saw surgery and was offered robotic repair  Will see them for elective surgery at a later date  Advised that if hernia gets painful to call ASAP    # Ch Sinusitis  Stat Flonase      # Lumbar spondylosis/ Hip OA  Take Tylenol 650 Q8 prn    Advance Directives Discussion  less than 30 minutes spent discussing Advanced Care Planning (including a Living Will, Healthcare POA, as well as specific end of life choices and/or directives). The details of that discussion were documented in Advanced Directives Discussion section of the medical record.         Depression Screening  _5_ minutes  were spent screening for depression using PHQ2/PHQ9 as documented in the chart.     Alcohol Screening  _1_ minutes were spent screening for alcohol use/misuse as documented in the chart.          Cardiac Risk Assessment  __minutes were spent assessing and discussing cardiovascular risk was and, if needed, lifestyle modifications recommended, including nutritional choices, exercise, and elimination of habits contributing to risk. Aspirin use/disuse was discussed following the guidelines below.     Low dose ASA ( mg) should be considered:  If you have prior Heart  Attack/Stroke/Peripheral vascular disease:  Generally recommend daily low dose aspirin unless extremely high bleeding risk (e.g., gastrointestinal).     If you do not have prior Heart Attack/Stroke/Peripheral vascular disease:    Age < 70 and your 10-year cardiovascular disease risk is >20%, use low dose Aspirin   Age >=70: Do not use Aspirin for prevention  Low Dose CT Screening  We discussed the pros and cons of low dose CT screening for lung cancer based on the patient's smoking history.  This screening is recommended for patients who:  Are between the age of 50 to 77  Have a 20 pack-year smoking history (20 years of smoking a pack a day)  Are either a current smoker or have quit smoking within the last 15 years  Are in good health - no new cough or unexplained weight loss  Are willing to do the follow-up testing and treatment, if needed  Have not had a chest CT (CAT) scan in the last year

## 2025-01-23 ENCOUNTER — OFFICE VISIT (OUTPATIENT)
Dept: UROLOGY | Facility: HOSPITAL | Age: 80
End: 2025-01-23
Payer: MEDICARE

## 2025-01-23 DIAGNOSIS — N52.9 ERECTILE DYSFUNCTION, UNSPECIFIED ERECTILE DYSFUNCTION TYPE: Primary | ICD-10-CM

## 2025-01-23 PROCEDURE — G2211 COMPLEX E/M VISIT ADD ON: HCPCS | Performed by: UROLOGY

## 2025-01-23 PROCEDURE — 99214 OFFICE O/P EST MOD 30 MIN: CPT | Performed by: UROLOGY

## 2025-01-23 RX ORDER — SILDENAFIL 100 MG/1
100 TABLET, FILM COATED ORAL DAILY PRN
Qty: 30 TABLET | Refills: 5 | Status: SHIPPED | OUTPATIENT
Start: 2025-01-23

## 2025-01-23 NOTE — PROGRESS NOTES
HPI    79 y.o. male being seen with the following problem list:    Problem list:  BPH/retention s/p HoLEP 5/2022 with Dr. Johnson  Phimosis, s/p circumcision 6/7/24     Patient was in urinary retention with a prostate volume of 138 cmÂ³. He underwent an uneventful holep on May 28, 2022. He developed significant hematuria in the postoperative area and was taken back for clot evacuation and fulguration.  He passed a voiding trial on May 23, 2022. Pathology showed 47.6 g of benign prostatic tissue     Last seen by Dr. johnson 6/10/22. good stream, no leakage, PVR 0ml     9/20/22 - comes in for follow up. Voiding well. Has some baseline urgency but no incontinence. Having issues retracting his foreskin.     11/15/22- Foreskin is manageable, has been using the cream. Voiding well. Urgency is minimal. Would not like anything done at this point.      11/10/23 - seen today for 1yr fuv with PVR. No urinary issues. PVR 1cc. Foreskin problem resolved. Notes longstanding ED, naive to meds. Has PD. No nitrates     3/20/24 - seen via thv for 3mo medication response to Sildenafil 100mg po prn. He currently has the flu. He reports that the sildenafil is helping with his erections. He is still having trouble with his foreskin, would like to come in to discuss this further in June/July 4/26/24 - notes more issues with his foreskin. Can retract, but very redundant and hard to pull back down, gets in the way, would like a circumcision. Also notes a bothersome bulge in his R groin.     6/7/24 - circumcision     7/11/24 - seen today for POV.  Has done well since circumcision.  Last week was walking down some stairs and he had a twinge of pain on the left side of his penis.     7/19/24 - seen today for fuv, exam. Overall doing well     10/23/24 - Voiding well, no urinary issues or concerns. No soreness now.      01/23/25 - Voiding well, no issues or concerns. ED, not taking any nitrates. Works well on viagra, would like a refill.       Lab  Results   Component Value Date    PSA 0.69 02/24/2023    PSA 6.02 (H) 02/17/2022              Current Medications:  Current Outpatient Medications   Medication Sig Dispense Refill    acetaminophen (Tylenol) 325 mg tablet Take 2 tablets (650 mg) by mouth every 6 hours if needed for mild pain (1 - 3). 30 tablet 0    bacitracin 500 unit/gram ointment Apply topically 2 times a day. (Patient not taking: Reported on 7/11/2024) 28 g 0    carbidopa-levodopa (Sinemet)  mg tablet Take 1 tablet by mouth 3 times a day. Take one tablet 3 times daily at 12 PM, 4 PM, 8 PM. 270 tablet 3    dorzolamide-timoloL (Cosopt) 22.3-6.8 mg/mL ophthalmic solution Dorzolamide HCl-Timolol Mal 22.3-6.8 MG/ML Ophthalmic Solution   Quantity: 10  Refills: 0        Start : 2-Mar-2021   Active      fluticasone (Flonase) 50 mcg/actuation nasal spray Administer 1 spray into each nostril once daily. Shake gently. Before first use, prime pump. After use, clean tip and replace cap. 16 g 11    furosemide (Lasix) 40 mg tablet Take 1 tablet (40 mg) by mouth once daily. 90 tablet 0    ibuprofen 600 mg tablet Take 1 tablet (600 mg) by mouth every 6 hours if needed for mild pain (1 - 3). 30 tablet 0    latanoprost (Xalatan) 0.005 % ophthalmic solution Administer 1 drop into both eyes once daily at bedtime.      losartan (Cozaar) 50 mg tablet Take 1 tablet (50 mg) by mouth once daily. 90 tablet 0    multivitamin tablet Take 1 tablet by mouth once daily.      oxyCODONE (Roxicodone) 5 mg immediate release tablet Take 1 tablet (5 mg) by mouth every 6 hours if needed for severe pain (7 - 10). (Patient not taking: Reported on 7/11/2024) 5 tablet 0    sildenafil (Viagra) 100 mg tablet Take 1 tablet (100 mg) by mouth once daily as needed for erectile dysfunction. 30 tablet 5    simvastatin (Zocor) 40 mg tablet Take 1 tablet (40 mg) by mouth once daily at bedtime. 60 tablet 2     No current facility-administered medications for this visit.        Active  Problems:  Yaniv Bearden is a 79 y.o. male with the following Problems and Medications.  Patient Active Problem List   Diagnosis    Acquired spondylolisthesis    Back pain    Benign essential hypertension    BPH with obstruction/lower urinary tract symptoms    Calf swelling    Enlarged prostate without lower urinary tract symptoms (luts)    Frequent urination    Gait instability    Hematuria    Paget's bone disease    Venous insufficiency    Urinary tract infection    Urinary retention    Rotator cuff arthropathy    Right shoulder pain    Phimosis    Parkinson's disease with dyskinesia, unspecified whether manifestations fluctuate    Hyperlipemia    Pedal edema    PVD (peripheral vascular disease) (CMS-[facility])    Chronic constipation    HT (hammer toe)    Arthritis of foot    Ankle arthritis    Foreskin problem    Unilateral inguinal hernia without obstruction or gangrene    H/O of acquired phimosis in male     Current Outpatient Medications   Medication Sig Dispense Refill    acetaminophen (Tylenol) 325 mg tablet Take 2 tablets (650 mg) by mouth every 6 hours if needed for mild pain (1 - 3). 30 tablet 0    bacitracin 500 unit/gram ointment Apply topically 2 times a day. (Patient not taking: Reported on 7/11/2024) 28 g 0    carbidopa-levodopa (Sinemet)  mg tablet Take 1 tablet by mouth 3 times a day. Take one tablet 3 times daily at 12 PM, 4 PM, 8 PM. 270 tablet 3    dorzolamide-timoloL (Cosopt) 22.3-6.8 mg/mL ophthalmic solution Dorzolamide HCl-Timolol Mal 22.3-6.8 MG/ML Ophthalmic Solution   Quantity: 10  Refills: 0        Start : 2-Mar-2021   Active      fluticasone (Flonase) 50 mcg/actuation nasal spray Administer 1 spray into each nostril once daily. Shake gently. Before first use, prime pump. After use, clean tip and replace cap. 16 g 11    furosemide (Lasix) 40 mg tablet Take 1 tablet (40 mg) by mouth once daily. 90 tablet 0    ibuprofen 600 mg tablet Take 1 tablet (600 mg) by mouth every 6 hours if  needed for mild pain (1 - 3). 30 tablet 0    latanoprost (Xalatan) 0.005 % ophthalmic solution Administer 1 drop into both eyes once daily at bedtime.      losartan (Cozaar) 50 mg tablet Take 1 tablet (50 mg) by mouth once daily. 90 tablet 0    multivitamin tablet Take 1 tablet by mouth once daily.      oxyCODONE (Roxicodone) 5 mg immediate release tablet Take 1 tablet (5 mg) by mouth every 6 hours if needed for severe pain (7 - 10). (Patient not taking: Reported on 7/11/2024) 5 tablet 0    sildenafil (Viagra) 100 mg tablet Take 1 tablet (100 mg) by mouth once daily as needed for erectile dysfunction. 30 tablet 5    simvastatin (Zocor) 40 mg tablet Take 1 tablet (40 mg) by mouth once daily at bedtime. 60 tablet 2     No current facility-administered medications for this visit.       PMH:  Past Medical History:   Diagnosis Date    Acquired spondylolisthesis     Arthritis     BPH with obstruction/lower urinary tract symptoms     Chronic constipation     Foreskin problem     Gait instability     H/O echocardiogram 02/24/2023    CONCLUSIONS: 1. Left ventricular systolic function is normal with a 55-60% estimated ejection fraction. 2. No left ventricular thrombus visualized. 3. RVSP within normal limits. 4. There is plaque visualized in the ascending aorta.    Hyperlipidemia     Hypertension     Overweight     Paget's bone disease     Parkinson disease (Multi)     Phimosis     PVD (peripheral vascular disease) (CMS-[facility])     Tremor     Venous insufficiency        PSH:  Past Surgical History:   Procedure Laterality Date    PROSTATE SURGERY  05/13/2022       FMH:  Family History   Problem Relation Name Age of Onset    No Known Problems Mother      No Known Problems Father      Heart disease Sister         SHx:  Social History     Tobacco Use    Smoking status: Never    Smokeless tobacco: Never   Vaping Use    Vaping status: Never Used   Substance Use Topics    Alcohol use: Not Currently     Alcohol/week: 1.0 standard drink  of alcohol     Types: 1 Standard drinks or equivalent per week    Drug use: Never       Allergies:  No Known Allergies      Assessment/Plan  Doing well from urinary standpoint. No soreness or pain.     ED, responded well to Viagra 100 mg. Not taking nitrates. Refill sent.     Follow up in 1 year with PVR.     Scribe Attestation  By signing my name below, I, Lb Alexander, attest that this documentation has been prepared under the direction and in the presence of Ramy Castle MD.

## 2025-02-20 ENCOUNTER — APPOINTMENT (OUTPATIENT)
Dept: OPHTHALMOLOGY | Facility: CLINIC | Age: 80
End: 2025-02-20
Payer: MEDICARE

## 2025-03-22 ENCOUNTER — HOSPITAL ENCOUNTER (EMERGENCY)
Facility: HOSPITAL | Age: 80
Discharge: HOME | End: 2025-03-22
Attending: INTERNAL MEDICINE
Payer: MEDICARE

## 2025-03-22 ENCOUNTER — APPOINTMENT (OUTPATIENT)
Dept: CARDIOLOGY | Facility: HOSPITAL | Age: 80
End: 2025-03-22
Payer: MEDICARE

## 2025-03-22 ENCOUNTER — APPOINTMENT (OUTPATIENT)
Dept: RADIOLOGY | Facility: HOSPITAL | Age: 80
End: 2025-03-22
Payer: MEDICARE

## 2025-03-22 VITALS
HEIGHT: 66 IN | HEART RATE: 58 BPM | DIASTOLIC BLOOD PRESSURE: 82 MMHG | WEIGHT: 170 LBS | RESPIRATION RATE: 16 BRPM | SYSTOLIC BLOOD PRESSURE: 151 MMHG | TEMPERATURE: 98 F | BODY MASS INDEX: 27.32 KG/M2 | OXYGEN SATURATION: 99 %

## 2025-03-22 DIAGNOSIS — M25.551 RIGHT HIP PAIN: Primary | ICD-10-CM

## 2025-03-22 LAB
ALBUMIN SERPL BCP-MCNC: 4.5 G/DL (ref 3.4–5)
ALP SERPL-CCNC: 50 U/L (ref 33–136)
ALT SERPL W P-5'-P-CCNC: 14 U/L (ref 10–52)
ANION GAP SERPL CALC-SCNC: 9 MMOL/L (ref 10–20)
APPEARANCE UR: CLEAR
AST SERPL W P-5'-P-CCNC: 24 U/L (ref 9–39)
BASOPHILS # BLD AUTO: 0.03 X10*3/UL (ref 0–0.1)
BASOPHILS NFR BLD AUTO: 0.5 %
BILIRUB SERPL-MCNC: 1.6 MG/DL (ref 0–1.2)
BILIRUB UR STRIP.AUTO-MCNC: NEGATIVE MG/DL
BNP SERPL-MCNC: 9 PG/ML (ref 0–99)
BUN SERPL-MCNC: 12 MG/DL (ref 6–23)
CALCIUM SERPL-MCNC: 9.9 MG/DL (ref 8.6–10.3)
CARDIAC TROPONIN I PNL SERPL HS: 5 NG/L (ref 0–20)
CARDIAC TROPONIN I PNL SERPL HS: 5 NG/L (ref 0–20)
CHLORIDE SERPL-SCNC: 104 MMOL/L (ref 98–107)
CO2 SERPL-SCNC: 31 MMOL/L (ref 21–32)
COLOR UR: ABNORMAL
CREAT SERPL-MCNC: 0.94 MG/DL (ref 0.5–1.3)
EGFRCR SERPLBLD CKD-EPI 2021: 81 ML/MIN/1.73M*2
EOSINOPHIL # BLD AUTO: 0.07 X10*3/UL (ref 0–0.4)
EOSINOPHIL NFR BLD AUTO: 1.1 %
ERYTHROCYTE [DISTWIDTH] IN BLOOD BY AUTOMATED COUNT: 12.7 % (ref 11.5–14.5)
GLUCOSE SERPL-MCNC: 99 MG/DL (ref 74–99)
GLUCOSE UR STRIP.AUTO-MCNC: NORMAL MG/DL
HCT VFR BLD AUTO: 40.1 % (ref 41–52)
HGB BLD-MCNC: 13.4 G/DL (ref 13.5–17.5)
IMM GRANULOCYTES # BLD AUTO: 0.01 X10*3/UL (ref 0–0.5)
IMM GRANULOCYTES NFR BLD AUTO: 0.2 % (ref 0–0.9)
KETONES UR STRIP.AUTO-MCNC: ABNORMAL MG/DL
LEUKOCYTE ESTERASE UR QL STRIP.AUTO: NEGATIVE
LIPASE SERPL-CCNC: 12 U/L (ref 9–82)
LYMPHOCYTES # BLD AUTO: 2.97 X10*3/UL (ref 0.8–3)
LYMPHOCYTES NFR BLD AUTO: 44.9 %
MAGNESIUM SERPL-MCNC: 2.12 MG/DL (ref 1.6–2.4)
MCH RBC QN AUTO: 30.5 PG (ref 26–34)
MCHC RBC AUTO-ENTMCNC: 33.4 G/DL (ref 32–36)
MCV RBC AUTO: 91 FL (ref 80–100)
MONOCYTES # BLD AUTO: 0.58 X10*3/UL (ref 0.05–0.8)
MONOCYTES NFR BLD AUTO: 8.8 %
MUCOUS THREADS #/AREA URNS AUTO: NORMAL /LPF
NEUTROPHILS # BLD AUTO: 2.95 X10*3/UL (ref 1.6–5.5)
NEUTROPHILS NFR BLD AUTO: 44.5 %
NITRITE UR QL STRIP.AUTO: NEGATIVE
NRBC BLD-RTO: 0 /100 WBCS (ref 0–0)
PH UR STRIP.AUTO: 6.5 [PH]
PHOSPHATE SERPL-MCNC: 2.7 MG/DL (ref 2.5–4.9)
PLATELET # BLD AUTO: 153 X10*3/UL (ref 150–450)
POTASSIUM SERPL-SCNC: 3.4 MMOL/L (ref 3.5–5.3)
PROT SERPL-MCNC: 7.5 G/DL (ref 6.4–8.2)
PROT UR STRIP.AUTO-MCNC: ABNORMAL MG/DL
RBC # BLD AUTO: 4.39 X10*6/UL (ref 4.5–5.9)
RBC # UR STRIP.AUTO: NEGATIVE MG/DL
RBC #/AREA URNS AUTO: NORMAL /HPF
SODIUM SERPL-SCNC: 141 MMOL/L (ref 136–145)
SP GR UR STRIP.AUTO: 1.01
TSH SERPL-ACNC: 1.76 MIU/L (ref 0.44–3.98)
UROBILINOGEN UR STRIP.AUTO-MCNC: ABNORMAL MG/DL
WBC # BLD AUTO: 6.6 X10*3/UL (ref 4.4–11.3)
WBC #/AREA URNS AUTO: NORMAL /HPF

## 2025-03-22 PROCEDURE — 84484 ASSAY OF TROPONIN QUANT: CPT | Performed by: INTERNAL MEDICINE

## 2025-03-22 PROCEDURE — 81001 URINALYSIS AUTO W/SCOPE: CPT | Performed by: INTERNAL MEDICINE

## 2025-03-22 PROCEDURE — 73502 X-RAY EXAM HIP UNI 2-3 VIEWS: CPT | Mod: RT

## 2025-03-22 PROCEDURE — 96361 HYDRATE IV INFUSION ADD-ON: CPT

## 2025-03-22 PROCEDURE — 93005 ELECTROCARDIOGRAM TRACING: CPT

## 2025-03-22 PROCEDURE — 84443 ASSAY THYROID STIM HORMONE: CPT | Performed by: INTERNAL MEDICINE

## 2025-03-22 PROCEDURE — 36415 COLL VENOUS BLD VENIPUNCTURE: CPT | Performed by: INTERNAL MEDICINE

## 2025-03-22 PROCEDURE — 2500000004 HC RX 250 GENERAL PHARMACY W/ HCPCS (ALT 636 FOR OP/ED): Performed by: INTERNAL MEDICINE

## 2025-03-22 PROCEDURE — 85025 COMPLETE CBC W/AUTO DIFF WBC: CPT | Performed by: INTERNAL MEDICINE

## 2025-03-22 PROCEDURE — 2500000001 HC RX 250 WO HCPCS SELF ADMINISTERED DRUGS (ALT 637 FOR MEDICARE OP): Performed by: INTERNAL MEDICINE

## 2025-03-22 PROCEDURE — 71046 X-RAY EXAM CHEST 2 VIEWS: CPT

## 2025-03-22 PROCEDURE — 83735 ASSAY OF MAGNESIUM: CPT | Performed by: INTERNAL MEDICINE

## 2025-03-22 PROCEDURE — 83880 ASSAY OF NATRIURETIC PEPTIDE: CPT | Performed by: INTERNAL MEDICINE

## 2025-03-22 PROCEDURE — 71046 X-RAY EXAM CHEST 2 VIEWS: CPT | Mod: FOREIGN READ | Performed by: RADIOLOGY

## 2025-03-22 PROCEDURE — 83690 ASSAY OF LIPASE: CPT | Performed by: INTERNAL MEDICINE

## 2025-03-22 PROCEDURE — 73502 X-RAY EXAM HIP UNI 2-3 VIEWS: CPT | Mod: RIGHT SIDE | Performed by: RADIOLOGY

## 2025-03-22 PROCEDURE — 80053 COMPREHEN METABOLIC PANEL: CPT | Performed by: INTERNAL MEDICINE

## 2025-03-22 PROCEDURE — 99285 EMERGENCY DEPT VISIT HI MDM: CPT | Mod: 25 | Performed by: INTERNAL MEDICINE

## 2025-03-22 PROCEDURE — 96360 HYDRATION IV INFUSION INIT: CPT

## 2025-03-22 PROCEDURE — 84100 ASSAY OF PHOSPHORUS: CPT | Performed by: INTERNAL MEDICINE

## 2025-03-22 RX ORDER — ACETAMINOPHEN 325 MG/1
650 TABLET ORAL ONCE
Status: COMPLETED | OUTPATIENT
Start: 2025-03-22 | End: 2025-03-22

## 2025-03-22 RX ADMIN — SODIUM CHLORIDE 1000 ML: 0.9 INJECTION, SOLUTION INTRAVENOUS at 14:15

## 2025-03-22 RX ADMIN — ACETAMINOPHEN 650 MG: 325 TABLET, FILM COATED ORAL at 13:30

## 2025-03-22 ASSESSMENT — PAIN DESCRIPTION - FREQUENCY: FREQUENCY: CONSTANT/CONTINUOUS

## 2025-03-22 ASSESSMENT — PAIN - FUNCTIONAL ASSESSMENT
PAIN_FUNCTIONAL_ASSESSMENT: 0-10
PAIN_FUNCTIONAL_ASSESSMENT: 0-10

## 2025-03-22 ASSESSMENT — PAIN DESCRIPTION - ORIENTATION: ORIENTATION: RIGHT

## 2025-03-22 ASSESSMENT — PAIN DESCRIPTION - DESCRIPTORS: DESCRIPTORS: ACHING

## 2025-03-22 ASSESSMENT — PAIN SCALES - GENERAL
PAINLEVEL_OUTOF10: 2
PAINLEVEL_OUTOF10: 10 - WORST POSSIBLE PAIN
PAINLEVEL_OUTOF10: 0 - NO PAIN

## 2025-03-22 ASSESSMENT — PAIN DESCRIPTION - PAIN TYPE: TYPE: ACUTE PAIN

## 2025-03-22 ASSESSMENT — PAIN DESCRIPTION - LOCATION: LOCATION: HIP

## 2025-03-22 NOTE — ED PROVIDER NOTES
HPI     CC: Weakness, Gen and Leg Swelling     HPI: Yaniv Bearden is a 79 y.o. male with a history of BPH s/p HoLEP, phimosis s/p circumcision, HTN, Parkinson's, HLD, inguinal hernia, chronic sinusitis, lumbar spondylosis/hip OA, who presents with generalized health decline and right hip pain.  Patient presents with his friend/landlord/ex-girlfriend/primary caregiver at bedside who helps to provide the history.  Patient himself states that he was doing some housework the other day and thinks he overdid it because his right hip has been bothering him ever since.  He endorses pain over the right greater trochanter and right anterior proximal femur.  He has chronic low back pain that has not worsened of late, she does have has chronic difficulty ambulating but refuses to use any ambulatory assistance.  He has a chronic inguinal hernia that is not bothering him.  He denies any numbness/tingling/weakness, bowel or bladder changes.  His caregiver at bedside states that patient is unable to care for himself adequately.  She is technically his landlord, was his ex many decades ago and he is living with her because he needed somewhere to go.  It takes him 45 minutes to put on his socks.  He can barely walk up the stairs where he stays, only needs to come down to eat though.  He does have a son who lives across town but is not really engaged in his care.  She is concerned that his health is declining and his legs are more swollen than usual.  She has her own personal medical and mental issues and it is a lot for her to manage.  She thinks he needs home health care or rehab facility but notes that patient is stubborn.  Patient denies any complaints other than the pain in the right hip.    ROS: 10-point review of systems was performed and is otherwise negative except as noted in HPI.    Limitations to history: Possible mild cognitive impairment    Independent Historians: As above    External Records Reviewed: Outpatient notes  "in EMR    Past Medical History: Noncontributory except per HPI     Past Surgical History: Noncontributory except per HPI     Family History: Reviewed and noncontributory     Social History:  Denies tobacco. Denies ETOH. Denies illicit drugs.    Social Determinants Affecting Care: N/A    No Known Allergies    Home Meds:   Current Outpatient Medications   Medication Instructions    acetaminophen (TYLENOL) 650 mg, oral, Every 6 hours PRN    bacitracin 500 unit/gram ointment Topical, 2 times daily    carbidopa-levodopa (Sinemet)  mg tablet 1 tablet, oral, 3 times daily, Take one tablet 3 times daily at 12 PM, 4 PM, 8 PM.    dorzolamide-timoloL (Cosopt) 22.3-6.8 mg/mL ophthalmic solution Dorzolamide HCl-Timolol Mal 22.3-6.8 MG/ML Ophthalmic Solution   Quantity: 10  Refills: 0        Start : 2-Mar-2021   Active    fluticasone (Flonase) 50 mcg/actuation nasal spray 1 spray, Each Nostril, Daily, Shake gently. Before first use, prime pump. After use, clean tip and replace cap.    furosemide (LASIX) 40 mg, oral, Daily    ibuprofen 600 mg, oral, Every 6 hours PRN    latanoprost (Xalatan) 0.005 % ophthalmic solution 1 drop, Both Eyes, Nightly    losartan (COZAAR) 50 mg, oral, Daily    multivitamin tablet 1 tablet, oral, Daily    oxyCODONE (ROXICODONE) 5 mg, oral, Every 6 hours PRN    sildenafil (VIAGRA) 100 mg, oral, Daily PRN    simvastatin (ZOCOR) 40 mg, oral, Nightly        Physical Exam     ED Triage Vitals [03/22/25 1113]   Temperature Heart Rate Respirations BP   36.7 °C (98 °F) 72 18 165/83      Pulse Ox Temp src Heart Rate Source Patient Position   97 % -- -- --      BP Location FiO2 (%)     -- --         Heart Rate:  [58-72]   Temperature:  [36.7 °C (98 °F)]   Respirations:  [16-18]   BP: (146-165)/()   Height:  [167.6 cm (5' 6\")]   Weight:  [77.1 kg (170 lb)]   Pulse Ox:  [97 %-100 %]      Physical Exam  Vitals and nursing note reviewed.     CONSTITUTIONAL: Well appearing, well nourished, in no acute " distress.   HENMT: Head atraumatic. Airway patent. Nasal mucosa clear. Mouth with normal mucosa, clear oropharynx. Uvula midline. Neck supple.    EYES: Clear bilaterally, pupils equally round and reactive to light.   CARDIOVASCULAR: Normal rate, regular rhythm.  Heart sounds S1, S2.  No murmurs, rubs or gallops. Normal pulses. Capillary refill < 2 sec.   RESPIRATORY: No increased work of breathing. Breath sounds clear and equal bilaterally.  GASTROINTESTINAL: Abdomen soft, non-distended, non-tender. No rebound, no guarding. Normal bowel sounds. No palpable masses.  GENITOURINARY:  No CVA tenderness.  MUSCULOSKELETAL: Tenderness over right greater trochanter.  Spine appears normal, range of motion is not limited, no muscle or joint tenderness, no midline spinal tenderness.  1+ bilateral lower extremity edema.  NEUROLOGICAL: Alert and oriented, no asymmetry, moving all extremities equally.  HSNE lumbar spine: Normal thigh sensation (L1-L2), normal thigh abduction (L2), knee extension (L3), ankle and great toe dorsiflexion (L4/L5), patellar reflex (L2-L4),  Knee flexion (S1), toe plantarflexion (S2), groin/perianal sensation (S3-5)  SKIN: Warm, dry and intact. No rash or notable lesions.  PSYCHIATRIC: Normal mood and affect.  HEME/LYMPH: No adenopathy or splenomegaly.    Diagnostic Results      ECG: ECGs read and interpreted by me. See ED Course, below, for interpretation.    Labs Reviewed   CBC WITH AUTO DIFFERENTIAL - Abnormal       Result Value    WBC 6.6      nRBC 0.0      RBC 4.39 (*)     Hemoglobin 13.4 (*)     Hematocrit 40.1 (*)     MCV 91      MCH 30.5      MCHC 33.4      RDW 12.7      Platelets 153      Neutrophils % 44.5      Immature Granulocytes %, Automated 0.2      Lymphocytes % 44.9      Monocytes % 8.8      Eosinophils % 1.1      Basophils % 0.5      Neutrophils Absolute 2.95      Immature Granulocytes Absolute, Automated 0.01      Lymphocytes Absolute 2.97      Monocytes Absolute 0.58      Eosinophils  Absolute 0.07      Basophils Absolute 0.03     COMPREHENSIVE METABOLIC PANEL - Abnormal    Glucose 99      Sodium 141      Potassium 3.4 (*)     Chloride 104      Bicarbonate 31      Anion Gap 9 (*)     Urea Nitrogen 12      Creatinine 0.94      eGFR 81      Calcium 9.9      Albumin 4.5      Alkaline Phosphatase 50      Total Protein 7.5      AST 24      Bilirubin, Total 1.6 (*)     ALT 14     URINALYSIS WITH REFLEX CULTURE AND MICROSCOPIC - Abnormal    Color, Urine Light-Yellow      Appearance, Urine Clear      Specific Gravity, Urine 1.014      pH, Urine 6.5      Protein, Urine 50 (1+) (*)     Glucose, Urine Normal      Blood, Urine NEGATIVE      Ketones, Urine 20 (1+) (*)     Bilirubin, Urine NEGATIVE      Urobilinogen, Urine 2 (1+) (*)     Nitrite, Urine NEGATIVE      Leukocyte Esterase, Urine NEGATIVE     MAGNESIUM - Normal    Magnesium 2.12     PHOSPHORUS - Normal    Phosphorus 2.7     LIPASE - Normal    Lipase 12      Narrative:     Venipuncture immediately after or during the administration of Metamizole may lead to falsely low results. Testing should be performed immediately prior to Metamizole dosing.   TSH WITH REFLEX TO FREE T4 IF ABNORMAL - Normal    Thyroid Stimulating Hormone 1.76      Narrative:     TSH testing is performed using different testing methodology at [facility] than at other [facility]. Direct result comparisons should only be made within the same method.     B-TYPE NATRIURETIC PEPTIDE - Normal    BNP 9      Narrative:        <100 pg/mL - Heart failure unlikely  100-299 pg/mL - Intermediate probability of acute heart                  failure exacerbation. Correlate with clinical                  context and patient history.    >=300 pg/mL - Heart Failure likely. Correlate with clinical                  context and patient history.    BNP testing is performed using different testing methodology at [facility] than at other system [facility]. Direct result  comparisons should only be made within the same method.      SERIAL TROPONIN-INITIAL - Normal    Troponin I, High Sensitivity 5      Narrative:     Less than 99th percentile of normal range cutoff-  Female and children under 18 years old <14 ng/L; Male <21 ng/L: Negative  Repeat testing should be performed if clinically indicated.     Female and children under 18 years old 14-50 ng/L; Male 21-50 ng/L:  Consistent with possible cardiac damage and possible increased clinical   risk. Serial measurements may help to assess extent of myocardial damage.     >50 ng/L: Consistent with cardiac damage, increased clinical risk and  myocardial infarction. Serial measurements may help assess extent of   myocardial damage.      NOTE: Children less than 1 year old may have higher baseline troponin   levels and results should be interpreted in conjunction with the overall   clinical context.     NOTE: Troponin I testing is performed using a different   testing methodology at [facility] than at other   [facility]. Direct result comparisons should only   be made within the same method.   SERIAL TROPONIN, 1 HOUR - Normal    Troponin I, High Sensitivity 5      Narrative:     Less than 99th percentile of normal range cutoff-  Female and children under 18 years old <14 ng/L; Male <21 ng/L: Negative  Repeat testing should be performed if clinically indicated.     Female and children under 18 years old 14-50 ng/L; Male 21-50 ng/L:  Consistent with possible cardiac damage and possible increased clinical   risk. Serial measurements may help to assess extent of myocardial damage.     >50 ng/L: Consistent with cardiac damage, increased clinical risk and  myocardial infarction. Serial measurements may help assess extent of   myocardial damage.      NOTE: Children less than 1 year old may have higher baseline troponin   levels and results should be interpreted in conjunction with the overall   clinical context.     NOTE:  Troponin I testing is performed using a different   testing methodology at [facility] than at other   [facility]. Direct result comparisons should only   be made within the same method.   TROPONIN SERIES- (INITIAL, 1 HR)    Narrative:     The following orders were created for panel order Troponin I Series, High Sensitivity (0, 1 HR).  Procedure                               Abnormality         Status                     ---------                               -----------         ------                     Troponin I, High Sensiti...[[id number]]  Normal              Final result               Troponin, High Sensitivi...[[id number]]  Normal              Final result                 Please view results for these tests on the individual orders.   URINALYSIS WITH REFLEX CULTURE AND MICROSCOPIC    Narrative:     The following orders were created for panel order Urinalysis with Reflex Culture and Microscopic.  Procedure                               Abnormality         Status                     ---------                               -----------         ------                     Urinalysis with Reflex C...[[id number]]  Abnormal            Final result               Extra Urine Gray Tube[[id number]]                                                         Please view results for these tests on the individual orders.   EXTRA URINE GRAY TUBE   URINALYSIS MICROSCOPIC WITH REFLEX CULTURE    WBC, Urine 1-5      RBC, Urine 1-2      Mucus, Urine FEW           XR chest 2 views   Final Result   No acute process.   Signed by Alex Macdonald MD      XR hip right with pelvis when performed 2 or 3 views   Final Result   No acute findings.  Chronic pagetoid changes in the right femur.   Signed by Micah Fox MD                    Keavy Coma Scale Score: 15                  Procedure  Procedures    ED Course & MDM   Assessment/Plan:   Yaniv Bearden is a 79 y.o. male with a history of BPH s/p HoLEP, phimosis s/p  circumcision, HTN, Parkinson's, HLD, inguinal hernia, chronic sinusitis, lumbar spondylosis/hip OA, who presents with generalized health decline and right hip pain.  He has been having trouble caring for himself at home and caregiver is concerned he may need placement or home health aides.  He has worsening of his right hip pain which per Dr. Stover's note from January and his caregivers report is largely chronic.  He is neurologically intact in the lower extremities pointing away from an acute spinal cord process.  It seems like presentation today is more of a generalized decline over time and caregiver needing additional support.  Screening medical workup was initiated with ECG, labs, chest and hip x-ray.  He was given Tylenol for pain.  See below for details of ED course and ultimate disposition.    Medications   acetaminophen (Tylenol) tablet 650 mg (650 mg oral Given 3/22/25 1330)   sodium chloride 0.9 % bolus 1,000 mL (0 mL intravenous Stopped 3/22/25 1626)        ED Course as of 03/23/25 0958   Sat Mar 22, 2025   1405 Labs are notable for CBC without leukocytosis, mild anemia 13.4, normal platelets, CMP with mild hypokalemia 3.4, normal renal and liver function except for mildly elevated total serum bilirubin 1.6, normal magnesium, phosphorus, BNP, lipase, troponin, TSH, urinalysis negative for UTI, notable for 1+ ketones. [CG]   1406 ECG read and interpreted by me.  Normal sinus rhythm, rate 64.  Left axis deviation.  Normal intervals.  No significant ST or T wave derangements. [CG]   1406 R hip XR no acute findings.  Chronic pagetoid changes in the right femur. [CG]   1406 CXR No acute process. [CG]   1703 Patient ambulated without difficulty per RN. He is requesting to be discharged, does not want to say for rehab placement. I did place a home health referral for HHA, PT/OT. Face to face completed.  Patient was discharged home with anticipatory guidance and strict return precautions. [CG]      ED Course  User Index  [CG] Kati Sweet MD         Diagnoses as of 03/23/25 0958   Right hip pain       Disposition:   DISCHARGE.  The patient was discharged with both verbal and written anticipatory guidance and strict return precautions. Discharge diagnosis, instructions and plan were discussed and understood. I emphasized the importance of following up with their primary care provider within 24-48 hours and with any referrals in the timeframe recommended. At the time of discharge the patient was comfortable and was in no apparent distress. Patient is aware of diagnostic uncertainty and was notified though testing is negative here, there is a very small chance that pathology may be missed.  The patient understands these risks and the patient/family understood to call 911 or return immediately to the emergency department if the symptoms worsen or if they have any additional concerns.      FOLLOW UP  Primary care provider in 1-2 days.      ED Prescriptions    None         Kati Sweet MD  EM/IM/Peds    This note was dictated by speech recognition. Minor errors in transcription may be present.     Kati Sweet MD  03/23/25 0959

## 2025-03-22 NOTE — ED TRIAGE NOTES
Patient c/o generalized weakness and leg swelling for 6 days. Pt also c/o right hip pain. Pt alert and oriented x 4.

## 2025-03-22 NOTE — DISCHARGE INSTRUCTIONS
You were seen today for right hip pain and generalized weakness.  Your evaluation was not concerning for an emergency at this time.  Your potassium was low and we repleted this.  We referred you to home health care for physical therapy, Occupational Therapy, and possibly a home health aide.  Please follow-up with your primary care physician on Monday.  You can take acetaminophen (Tylenol) 650 mg every 6 hours and ibuprofen (Motrin) 600 mg every 6 hours, alternating, for pain control.  Please see the attached information sheet for information about your condition, how to care for your condition at home, and reasons to return to the emergency department. Take any prescriptions written today as prescribed. You should call your primary care provider within 24 hours to tell them about today's visit, including any new medications or medication changes, as he or she may want to see you in the office for further evaluation. If you do not have a primary care provider, call  [phone] for an appointment. We offer in-person office visits as well as virtual options. Please do not hesitate to call  [phone] or return to the emergency department with any new or unresolved concerns or symptoms. Thank you for choosing [facility] for your care.

## 2025-03-23 ENCOUNTER — HOME HEALTH ADMISSION (OUTPATIENT)
Dept: HOME HEALTH SERVICES | Facility: HOME HEALTH | Age: 80
End: 2025-03-23
Payer: MEDICARE

## 2025-03-23 ENCOUNTER — DOCUMENTATION (OUTPATIENT)
Dept: HOME HEALTH SERVICES | Facility: HOME HEALTH | Age: 80
End: 2025-03-23
Payer: MEDICARE

## 2025-03-23 NOTE — HH CARE COORDINATION
Home Care received a Referral for Physical Therapy, Occupational Therapy, and Home Health Aide. We have processed the referral for a Start of Care on 3/25.     If you have any questions or concerns, please feel free to contact us at [phone]. Follow the prompts, enter your five digit zip code, and you will be directed to your care team on [facility] 3.

## 2025-03-24 LAB
ATRIAL RATE: 64 BPM
P AXIS: 53 DEGREES
P OFFSET: 188 MS
P ONSET: 131 MS
PR INTERVAL: 168 MS
Q ONSET: 215 MS
QRS COUNT: 11 BEATS
QRS DURATION: 86 MS
QT INTERVAL: 384 MS
QTC CALCULATION(BAZETT): 396 MS
QTC FREDERICIA: 392 MS
R AXIS: -41 DEGREES
T AXIS: 66 DEGREES
T OFFSET: 407 MS
VENTRICULAR RATE: 64 BPM

## 2025-03-26 ENCOUNTER — HOME CARE VISIT (OUTPATIENT)
Dept: HOME HEALTH SERVICES | Facility: HOME HEALTH | Age: 80
End: 2025-03-26
Payer: MEDICARE

## 2025-03-26 VITALS — BODY MASS INDEX: 26.52 KG/M2 | HEIGHT: 66 IN | WEIGHT: 165 LBS

## 2025-03-26 PROCEDURE — G0151 HHCP-SERV OF PT,EA 15 MIN: HCPCS

## 2025-03-26 ASSESSMENT — ENCOUNTER SYMPTOMS
PAIN LOCATION - PAIN FREQUENCY: CONSTANT
MUSCLE WEAKNESS: 1
PERSON REPORTING PAIN: PATIENT
HYPERTENSION: 1
PAIN SEVERITY GOAL: 0/10
DEPRESSION: 0
LOWER EXTREMITY EDEMA: 1
SUBJECTIVE PAIN PROGRESSION: GRADUALLY WORSENING
PAIN: 1
PAIN LOCATION: RIGHT HIP
DYSPNEA ON EXERTION: 1
FATIGUES EASILY: 1
ARTHRALGIAS: 1
HIGHEST PAIN SEVERITY IN PAST 24 HOURS: 6/10
LOSS OF SENSATION IN FEET: 0
PAIN LOCATION - PAIN QUALITY: ACHE
OCCASIONAL FEELINGS OF UNSTEADINESS: 1
LOWEST PAIN SEVERITY IN PAST 24 HOURS: 1/10

## 2025-03-26 ASSESSMENT — ACTIVITIES OF DAILY LIVING (ADL)
ENTERING_EXITING_HOME: MODERATE ASSIST
PHYSICAL TRANSFERS ASSESSED: 1
AMBULATION ASSISTANCE ON FLAT SURFACES: 1
CURRENT_FUNCTION: SUPERVISION
AMBULATION ASSISTANCE: SUPERVISION
AMBULATION_DISTANCE/DURATION_TOLERATED: 20 FT
AMBULATION ASSISTANCE: 1
OASIS_M1830: 05

## 2025-03-26 NOTE — Clinical Note
"SOCIAL: Son lives across town, not engaged with pt's care. . Friend Yoselyn lives downstairs, has known him many years.  Pt lives in top floor of [facility]. Yoselyn expressed privately that pt is afraid she is gonna \"kick him out\" . They were boyfriend and girlfriend many decades ago and stayed friends. After his prostate sx 3 yrs ago, he asked to come stay with her \"for 6 months\".   Yoselyn took care of her elderly mother for years and feels she cannot go through this level of  caretaking again , and she is much older, herself. When pt came to her house, he was able to go up and down the stairs freely, go out to eat alot with her , and take care of himself alot better. She said it would be different if he was family, but he is not.  She states she has anxiety. An MSW referral has been made to explore eligibility for support services . I also referred to ST to check his swallowing because he acknowledges that he coughs sometimes when swallowing, and his voice projection /phonation is poor. "

## 2025-03-26 NOTE — HOME HEALTH
"SOCIAL: Son lives across town, not engaged with pt's care. . Friend Yoselyn lives downstairs, has known him many years.  [facility] Yoselyn expressed privately that pt is afraid she is gonna \"kick him out\" . They were boyfriend and girlfriend many decades ago and stayed friends. After his prostate sx 3 yrs ago, he asked to come stay with her \"for 6 months\".   Yoselyn took care of her elderly mother for years and feels she cannot go through this level of  caretaking again , and she is much older, herself. When pt came to her house, he was able to go up and down the stairs freely, go out to eat alot with her , and take care of himself alot better. She said it would be different if he was family, but he is not.  She states she has anxiety. An MSW referral has been made to explore eligibility for support services .     CC: Weakness, Gen and Leg Swelling   HPI: Yaniv Bearden is a 79 y.o. male with a history of BPH s/p HoLEP, phimosis s/p circumcision, HTN, Parkinson's, HLD, inguinal hernia, chronic sinusitis, lumbar spondylosis/hip OA, who presents with generalized health decline and right hip pain. Patient presents with his friend/landlord/ex-girlfriend/primary caregiver at bedside who helps to provide the history. Patient himself states that he was doing some housework the other day and thinks he overdid it because his right hip has been bothering him ever since. He endorses pain over the right greater trochanter and right anterior proximal femur. He has chronic low back pain that has not worsened of late, she does have has chronic difficulty ambulating but refuses to use any ambulatory assistance. He has a chronic inguinal hernia that is not bothering him. He denies any numbness/tingling/weakness, bowel or bladder changes. His caregiver at bedside states that patient is unable to care for himself adequately. She is technically his landlord, was his ex many decades ago and he is living with " "her because he needed somewhere to go. It takes him 45 minutes to put on his socks. He can barely walk up the stairs where he stays, only needs to come down to eat though. He does have a son who lives across town but is not really engaged in his care. She is concerned that his health is declining and his legs are more swollen than usual. She has her own personal medical and mental issues and it is a lot for her to manage. She thinks he needs home health care or rehab facility but notes that patient is stubborn. Patient denies any complaints other than the pain in the right hip.   PRIOR FUNCTION:  Hasnt been using AD. Wasnt using a spc .  Got self dressed- slowly .   Indep showering. Difficulty with steps .   Now, the R foot turns with wb.   PRECAUTIONS: universal, fall risk. Feels like R LE shorter than L. Pt endorses that he has difficulty swallowing at times, and his voice projection is very soft. Parkinson's is also affecting his ability to take a deep inhalation.  prostate sx 2 -3 years ago.  Pt states it's getting difficult to go downstairs for meals. He says he doesnt like Yoselyn to bring meals up because she has \"other problems\"       Pt has not previously had rehab interventions for Parkinson's.     HPI: Yaniv Bearden is a 79 y.o. male with a history of BPH s/p HoLEP, phimosis s/p circumcision, HTN, Parkinson's, HLD, inguinal hernia, chronic sinusitis, lumbar spondylosis/hip OA, who presents with generalized health decline and right hip pain. Patient presents with his friend/landlord/ex-girlfriend/primary caregiver at bedside who helps to provide the history. Patient himself states that he was doing some housework the other day and thinks he overdid it because his right hip has been bothering him ever since. He endorses pain over the right greater trochanter and right anterior proximal femur. He has chronic low back pain that has not worsened of late, she does have has chronic difficulty ambulating but " refuses to use any ambulatory assistance. He has a chronic inguinal hernia that is not bothering him. He denies any numbness/tingling/weakness, bowel or bladder changes. His caregiver at bedside states that patient is unable to care for himself adequately. She is technically his landlord, was his ex many decades ago and he is living with her because he needed somewhere to go. It takes him 45 minutes to put on his socks. He can barely walk up the stairs where he stays, only needs to come down to eat though. He does have a son who lives across town but is not really engaged in his care. She is concerned that his health is declining and his legs are more swollen than usual. She has her own personal medical and mental issues and it is a lot for her to manage. She thinks he needs home health care or rehab facility but notes that patient is stubborn. Patient denies any complaints other than the pain in the right hip.

## 2025-03-27 ENCOUNTER — HOME CARE VISIT (OUTPATIENT)
Dept: HOME HEALTH SERVICES | Facility: HOME HEALTH | Age: 80
End: 2025-03-27
Payer: MEDICARE

## 2025-03-27 PROCEDURE — G0155 HHCP-SVS OF CSW,EA 15 MIN: HCPCS

## 2025-03-27 SDOH — HEALTH STABILITY: MENTAL HEALTH: STRESS FACTORS COMMENTS: ONGOING CARE NEEDS

## 2025-03-27 SDOH — HEALTH STABILITY: MENTAL HEALTH: SOCIAL ISOLATION: 1

## 2025-03-27 ASSESSMENT — ENCOUNTER SYMPTOMS: AGITATION: 1

## 2025-03-27 ASSESSMENT — ACTIVITIES OF DAILY LIVING (ADL)
SHOPPING_REQUIRES_ASSISTANCE: 1
DRESSING_REQUIRES_ASSISTANCE: 1
BATHING_REQUIRES_ASSISTANCE: 1
LAUNDRY_REQUIRES_ASSISTANCE: 1
AMBULATION_REQUIRES_ASSISTANCE: 1

## 2025-03-28 ENCOUNTER — HOME CARE VISIT (OUTPATIENT)
Dept: HOME HEALTH SERVICES | Facility: HOME HEALTH | Age: 80
End: 2025-03-28
Payer: MEDICARE

## 2025-03-28 PROCEDURE — G0152 HHCP-SERV OF OT,EA 15 MIN: HCPCS

## 2025-03-28 ASSESSMENT — ACTIVITIES OF DAILY LIVING (ADL)
DRESSING_UB_CURRENT_FUNCTION: MINIMUM ASSIST
FEEDING: INDEPENDENT
TOILETING: MINIMUM ASSIST
ORAL_CARE_ASSESSED: 1
TELEPHONE USE ASSESSED: 1
TOILETING: 1
SHOPPING ASSESSED: 1
AMBULATION ASSISTANCE: 1
BATHING_CURRENT_FUNCTION: MODERATE ASSIST
GROOMING ASSESSED: 1
LAUNDRY ASSESSED: 1
HOUSEKEEPING ASSESSED: 1
AMBULATION ASSISTANCE: MINIMUM ASSIST
BATHING ASSESSED: 1
PHYSICAL TRANSFERS ASSESSED: 1
FEEDING ASSESSED: 1
TRANSPORTATION ASSESSED: 1
DRESSING_LB_CURRENT_FUNCTION: MINIMUM ASSIST

## 2025-03-28 ASSESSMENT — ENCOUNTER SYMPTOMS
PERSON REPORTING PAIN: PATIENT
DENIES PAIN: 1

## 2025-03-30 ASSESSMENT — ACTIVITIES OF DAILY LIVING (ADL)
PREPARING MEALS: DEPENDENT
CURRENT_FUNCTION: MINIMUM ASSIST
LAUNDRY: DEPENDENT
ORAL_CARE_CURRENT_FUNCTION: INDEPENDENT
TRANSPORTATION: DEPENDENT
SHOPPING: DEPENDENT
GROOMING_CURRENT_FUNCTION: INDEPENDENT
LIGHT HOUSEKEEPING: DEPENDENT
USING THE TELPHONE: DEPENDENT

## 2025-03-31 ENCOUNTER — APPOINTMENT (OUTPATIENT)
Dept: HOME HEALTH SERVICES | Facility: HOME HEALTH | Age: 80
End: 2025-03-31
Payer: MEDICARE

## 2025-04-01 ENCOUNTER — APPOINTMENT (OUTPATIENT)
Dept: RADIOLOGY | Facility: HOSPITAL | Age: 80
End: 2025-04-01
Payer: MEDICARE

## 2025-04-01 ENCOUNTER — HOME CARE VISIT (OUTPATIENT)
Dept: HOME HEALTH SERVICES | Facility: HOME HEALTH | Age: 80
End: 2025-04-01
Payer: MEDICARE

## 2025-04-01 ENCOUNTER — HOSPITAL ENCOUNTER (OUTPATIENT)
Facility: HOSPITAL | Age: 80
Setting detail: OBSERVATION
Discharge: NURSING, REHAB OR LONG-TERM CARE | End: 2025-04-04
Attending: EMERGENCY MEDICINE | Admitting: NURSE PRACTITIONER
Payer: MEDICARE

## 2025-04-01 DIAGNOSIS — N47.1 PHIMOSIS: ICD-10-CM

## 2025-04-01 DIAGNOSIS — E78.5 HYPERLIPIDEMIA, UNSPECIFIED HYPERLIPIDEMIA TYPE: ICD-10-CM

## 2025-04-01 DIAGNOSIS — G89.18 ACUTE POST-OPERATIVE PAIN: ICD-10-CM

## 2025-04-01 DIAGNOSIS — M88.851: Primary | ICD-10-CM

## 2025-04-01 DIAGNOSIS — R26.81 GAIT INSTABILITY: ICD-10-CM

## 2025-04-01 PROBLEM — M79.89 CALF SWELLING: Status: RESOLVED | Noted: 2023-02-11 | Resolved: 2025-04-01

## 2025-04-01 PROBLEM — N39.0 URINARY TRACT INFECTION: Status: RESOLVED | Noted: 2023-02-11 | Resolved: 2025-04-01

## 2025-04-01 PROBLEM — R31.9 HEMATURIA: Status: RESOLVED | Noted: 2023-02-11 | Resolved: 2025-04-01

## 2025-04-01 PROBLEM — R35.0 FREQUENT URINATION: Status: RESOLVED | Noted: 2023-02-11 | Resolved: 2025-04-01

## 2025-04-01 PROBLEM — N40.0 ENLARGED PROSTATE WITHOUT LOWER URINARY TRACT SYMPTOMS (LUTS): Status: RESOLVED | Noted: 2023-02-11 | Resolved: 2025-04-01

## 2025-04-01 PROBLEM — R33.9 URINARY RETENTION: Status: RESOLVED | Noted: 2023-02-11 | Resolved: 2025-04-01

## 2025-04-01 LAB
ALBUMIN SERPL BCP-MCNC: 4.7 G/DL (ref 3.4–5)
ALP SERPL-CCNC: 55 U/L (ref 33–136)
ALT SERPL W P-5'-P-CCNC: 3 U/L (ref 10–52)
ANION GAP SERPL CALC-SCNC: 12 MMOL/L (ref 10–20)
AST SERPL W P-5'-P-CCNC: 23 U/L (ref 9–39)
BASOPHILS # BLD AUTO: 0.05 X10*3/UL (ref 0–0.1)
BASOPHILS NFR BLD AUTO: 0.7 %
BILIRUB SERPL-MCNC: 1.3 MG/DL (ref 0–1.2)
BUN SERPL-MCNC: 13 MG/DL (ref 6–23)
CALCIUM SERPL-MCNC: 10.3 MG/DL (ref 8.6–10.6)
CHLORIDE SERPL-SCNC: 101 MMOL/L (ref 98–107)
CO2 SERPL-SCNC: 31 MMOL/L (ref 21–32)
CREAT SERPL-MCNC: 1.1 MG/DL (ref 0.5–1.3)
EGFRCR SERPLBLD CKD-EPI 2021: 67 ML/MIN/1.73M*2
EOSINOPHIL # BLD AUTO: 0.15 X10*3/UL (ref 0–0.4)
EOSINOPHIL NFR BLD AUTO: 2 %
ERYTHROCYTE [DISTWIDTH] IN BLOOD BY AUTOMATED COUNT: 12 % (ref 11.5–14.5)
GLUCOSE SERPL-MCNC: 101 MG/DL (ref 74–99)
HCT VFR BLD AUTO: 37.4 % (ref 41–52)
HGB BLD-MCNC: 13.3 G/DL (ref 13.5–17.5)
IMM GRANULOCYTES # BLD AUTO: 0.02 X10*3/UL (ref 0–0.5)
IMM GRANULOCYTES NFR BLD AUTO: 0.3 % (ref 0–0.9)
LYMPHOCYTES # BLD AUTO: 1.61 X10*3/UL (ref 0.8–3)
LYMPHOCYTES NFR BLD AUTO: 21.5 %
MCH RBC QN AUTO: 30.7 PG (ref 26–34)
MCHC RBC AUTO-ENTMCNC: 35.6 G/DL (ref 32–36)
MCV RBC AUTO: 86 FL (ref 80–100)
MONOCYTES # BLD AUTO: 0.6 X10*3/UL (ref 0.05–0.8)
MONOCYTES NFR BLD AUTO: 8 %
NEUTROPHILS # BLD AUTO: 5.07 X10*3/UL (ref 1.6–5.5)
NEUTROPHILS NFR BLD AUTO: 67.5 %
NRBC BLD-RTO: 0 /100 WBCS (ref 0–0)
PLATELET # BLD AUTO: 181 X10*3/UL (ref 150–450)
POTASSIUM SERPL-SCNC: 3.6 MMOL/L (ref 3.5–5.3)
PROT SERPL-MCNC: 7.4 G/DL (ref 6.4–8.2)
RBC # BLD AUTO: 4.33 X10*6/UL (ref 4.5–5.9)
SODIUM SERPL-SCNC: 140 MMOL/L (ref 136–145)
WBC # BLD AUTO: 7.5 X10*3/UL (ref 4.4–11.3)

## 2025-04-01 PROCEDURE — 99223 1ST HOSP IP/OBS HIGH 75: CPT | Performed by: NURSE PRACTITIONER

## 2025-04-01 PROCEDURE — 2500000002 HC RX 250 W HCPCS SELF ADMINISTERED DRUGS (ALT 637 FOR MEDICARE OP, ALT 636 FOR OP/ED): Performed by: NURSE PRACTITIONER

## 2025-04-01 PROCEDURE — 96372 THER/PROPH/DIAG INJ SC/IM: CPT | Performed by: NURSE PRACTITIONER

## 2025-04-01 PROCEDURE — 2500000005 HC RX 250 GENERAL PHARMACY W/O HCPCS: Performed by: NURSE PRACTITIONER

## 2025-04-01 PROCEDURE — 85025 COMPLETE CBC W/AUTO DIFF WBC: CPT | Performed by: PHYSICIAN ASSISTANT

## 2025-04-01 PROCEDURE — 2500000004 HC RX 250 GENERAL PHARMACY W/ HCPCS (ALT 636 FOR OP/ED): Performed by: NURSE PRACTITIONER

## 2025-04-01 PROCEDURE — 36415 COLL VENOUS BLD VENIPUNCTURE: CPT | Performed by: PHYSICIAN ASSISTANT

## 2025-04-01 PROCEDURE — 73503 X-RAY EXAM HIP UNI 4/> VIEWS: CPT | Mod: RT

## 2025-04-01 PROCEDURE — 73502 X-RAY EXAM HIP UNI 2-3 VIEWS: CPT | Mod: RIGHT SIDE | Performed by: RADIOLOGY

## 2025-04-01 PROCEDURE — 73564 X-RAY EXAM KNEE 4 OR MORE: CPT | Mod: RIGHT SIDE | Performed by: RADIOLOGY

## 2025-04-01 PROCEDURE — 99285 EMERGENCY DEPT VISIT HI MDM: CPT | Performed by: PHYSICIAN ASSISTANT

## 2025-04-01 PROCEDURE — 73552 X-RAY EXAM OF FEMUR 2/>: CPT | Mod: RT

## 2025-04-01 PROCEDURE — 99285 EMERGENCY DEPT VISIT HI MDM: CPT | Performed by: EMERGENCY MEDICINE

## 2025-04-01 PROCEDURE — G0378 HOSPITAL OBSERVATION PER HR: HCPCS

## 2025-04-01 PROCEDURE — 73552 X-RAY EXAM OF FEMUR 2/>: CPT | Mod: RIGHT SIDE | Performed by: RADIOLOGY

## 2025-04-01 PROCEDURE — 73564 X-RAY EXAM KNEE 4 OR MORE: CPT | Mod: RT

## 2025-04-01 PROCEDURE — 2500000001 HC RX 250 WO HCPCS SELF ADMINISTERED DRUGS (ALT 637 FOR MEDICARE OP): Performed by: NURSE PRACTITIONER

## 2025-04-01 PROCEDURE — 80053 COMPREHEN METABOLIC PANEL: CPT | Performed by: PHYSICIAN ASSISTANT

## 2025-04-01 RX ORDER — DORZOLAMIDE HYDROCHLORIDE AND TIMOLOL MALEATE 20; 5 MG/ML; MG/ML
1 SOLUTION/ DROPS OPHTHALMIC 2 TIMES DAILY
Status: DISCONTINUED | OUTPATIENT
Start: 2025-04-01 | End: 2025-04-04 | Stop reason: HOSPADM

## 2025-04-01 RX ORDER — LATANOPROST 50 UG/ML
1 SOLUTION/ DROPS OPHTHALMIC NIGHTLY
Status: DISCONTINUED | OUTPATIENT
Start: 2025-04-01 | End: 2025-04-04 | Stop reason: HOSPADM

## 2025-04-01 RX ORDER — DICLOFENAC SODIUM 10 MG/G
4 GEL TOPICAL 4 TIMES DAILY
Status: DISCONTINUED | OUTPATIENT
Start: 2025-04-01 | End: 2025-04-04 | Stop reason: HOSPADM

## 2025-04-01 RX ORDER — FUROSEMIDE 40 MG/1
40 TABLET ORAL DAILY
Status: DISCONTINUED | OUTPATIENT
Start: 2025-04-01 | End: 2025-04-04 | Stop reason: HOSPADM

## 2025-04-01 RX ORDER — FLUTICASONE PROPIONATE 50 MCG
1 SPRAY, SUSPENSION (ML) NASAL DAILY
Status: DISCONTINUED | OUTPATIENT
Start: 2025-04-01 | End: 2025-04-04 | Stop reason: HOSPADM

## 2025-04-01 RX ORDER — ACETAMINOPHEN 325 MG/1
975 TABLET ORAL EVERY 8 HOURS
Status: DISCONTINUED | OUTPATIENT
Start: 2025-04-01 | End: 2025-04-04 | Stop reason: HOSPADM

## 2025-04-01 RX ORDER — AMOXICILLIN 250 MG
2 CAPSULE ORAL 2 TIMES DAILY
Status: DISCONTINUED | OUTPATIENT
Start: 2025-04-01 | End: 2025-04-04 | Stop reason: HOSPADM

## 2025-04-01 RX ORDER — LIDOCAINE 560 MG/1
1 PATCH PERCUTANEOUS; TOPICAL; TRANSDERMAL DAILY
Status: DISCONTINUED | OUTPATIENT
Start: 2025-04-01 | End: 2025-04-04 | Stop reason: HOSPADM

## 2025-04-01 RX ORDER — CARBIDOPA AND LEVODOPA 25; 100 MG/1; MG/1
1 TABLET ORAL 3 TIMES DAILY
Status: DISCONTINUED | OUTPATIENT
Start: 2025-04-01 | End: 2025-04-04 | Stop reason: HOSPADM

## 2025-04-01 RX ORDER — ENOXAPARIN SODIUM 100 MG/ML
40 INJECTION SUBCUTANEOUS EVERY 24 HOURS
Status: DISCONTINUED | OUTPATIENT
Start: 2025-04-01 | End: 2025-04-04 | Stop reason: HOSPADM

## 2025-04-01 RX ORDER — SIMVASTATIN 20 MG/1
40 TABLET, FILM COATED ORAL NIGHTLY
Status: DISCONTINUED | OUTPATIENT
Start: 2025-04-01 | End: 2025-04-04 | Stop reason: HOSPADM

## 2025-04-01 RX ADMIN — SIMVASTATIN 40 MG: 20 TABLET, FILM COATED ORAL at 21:39

## 2025-04-01 RX ADMIN — ACETAMINOPHEN 975 MG: 325 TABLET ORAL at 18:46

## 2025-04-01 RX ADMIN — ENOXAPARIN SODIUM 40 MG: 100 INJECTION SUBCUTANEOUS at 18:46

## 2025-04-01 RX ADMIN — LATANOPROST 1 DROP: 50 SOLUTION OPHTHALMIC at 21:40

## 2025-04-01 RX ADMIN — FLUTICASONE PROPIONATE 1 SPRAY: 50 SPRAY, METERED NASAL at 18:46

## 2025-04-01 RX ADMIN — FUROSEMIDE 40 MG: 40 TABLET ORAL at 18:47

## 2025-04-01 RX ADMIN — LIDOCAINE 4% 1 PATCH: 40 PATCH TOPICAL at 18:47

## 2025-04-01 RX ADMIN — DORZOLAMIDE HYDROCHLORIDE AND TIMOLOL MALEATE 1 DROP: 20; 5 SOLUTION OPHTHALMIC at 21:40

## 2025-04-01 RX ADMIN — CARBIDOPA AND LEVODOPA 1 TABLET: 25; 100 TABLET ORAL at 21:40

## 2025-04-01 RX ADMIN — SENNOSIDES AND DOCUSATE SODIUM 2 TABLET: 50; 8.6 TABLET ORAL at 21:39

## 2025-04-01 ASSESSMENT — LIFESTYLE VARIABLES
EVER HAD A DRINK FIRST THING IN THE MORNING TO STEADY YOUR NERVES TO GET RID OF A HANGOVER: NO
HAVE PEOPLE ANNOYED YOU BY CRITICIZING YOUR DRINKING: NO
TOTAL SCORE: 0
HAVE YOU EVER FELT YOU SHOULD CUT DOWN ON YOUR DRINKING: NO
EVER FELT BAD OR GUILTY ABOUT YOUR DRINKING: NO

## 2025-04-01 ASSESSMENT — ENCOUNTER SYMPTOMS
GASTROINTESTINAL NEGATIVE: 1
CONSTITUTIONAL NEGATIVE: 1
WEAKNESS: 1
EYES NEGATIVE: 1
CARDIOVASCULAR NEGATIVE: 1

## 2025-04-01 ASSESSMENT — PAIN SCALES - GENERAL
PAINLEVEL_OUTOF10: 0 - NO PAIN
PAINLEVEL_OUTOF10: 5 - MODERATE PAIN
PAINLEVEL_OUTOF10: 0 - NO PAIN
PAINLEVEL_OUTOF10: 0 - NO PAIN

## 2025-04-01 ASSESSMENT — COLUMBIA-SUICIDE SEVERITY RATING SCALE - C-SSRS
2. HAVE YOU ACTUALLY HAD ANY THOUGHTS OF KILLING YOURSELF?: NO
6. HAVE YOU EVER DONE ANYTHING, STARTED TO DO ANYTHING, OR PREPARED TO DO ANYTHING TO END YOUR LIFE?: NO
1. IN THE PAST MONTH, HAVE YOU WISHED YOU WERE DEAD OR WISHED YOU COULD GO TO SLEEP AND NOT WAKE UP?: NO

## 2025-04-01 ASSESSMENT — PAIN DESCRIPTION - PAIN TYPE: TYPE: ACUTE PAIN

## 2025-04-01 ASSESSMENT — ACTIVITIES OF DAILY LIVING (ADL): LACK_OF_TRANSPORTATION: NO

## 2025-04-01 ASSESSMENT — PAIN - FUNCTIONAL ASSESSMENT: PAIN_FUNCTIONAL_ASSESSMENT: 0-10

## 2025-04-01 NOTE — ED TRIAGE NOTES
TRIAGE NOTE   I saw the patient as the Clinician in Triage and performed a brief history and physical exam, established acuity, and ordered appropriate tests to develop basic plan of care. Patient will be seen by an [facility], resident and/or physician who will independently evaluate the patient. Please see subsequent provider notes for further details and disposition.     Brief HPI: In brief, Yaniv Bearden is a 79 y.o. male with pmh of  BPH  HTN, Parkinson's, HLD, inguinal hernia, chronic sinusitis, lumbar spondylosis/hip OA that presents for right hip/leg pain.  He states that he has been having this pain for approximately 1 month since a fall.  He states the pain worsened last night.  He states that he has been unable to ambulate today due to the pain.  He denies any new falls or injuries.  He denies any other areas of injury/pain.  Does endorse generalized malaise..     Focused Physical exam:   Patient alert and oriented x 3, however is very slow to respond.  Tender to palpation of the right hip, femur, and knee without any open wounds or obvious deformity.  Neurovascular intact Valerius pain.  Bilateral 1+ pitting edema to lower extremities.      Plan/MDM:   Obtain x-ray of patient's right hip, femur, knee.  Obtain basic laboratory studies.  Patient's records were reviewed which showed recent visit to [facility] for similar symptoms where home health versus rehab placement had been discussed, however at that time patient wanted to be discharged home.  As patient's symptoms are worsening he will likely require PT OT evaluation and possible placement due to his difficulty with ambulating and ADLs.      Please see subsequent provider note for further details and disposition       I evaluated this patient in triage with the RN. Due to the patients complaint labs and or imaging were ordered by myself in an attempt to expedite patient care however I am not participating in care after evaluation. This is a preliminary  assessment. Pt does not appear in acute distress at this time. They will have a full evaluation as soon as possible. They will be cared for by another provider who will possibly order more labs, imaging or interventions. Pt did not have a full ROS or PE completed by myself however below is a summary with reasons for orders.  For the remainder of the patient's workup and ED course, please refer to the main ED provider note. We discussed need for diagnostic testing including laboratory studies and imaging.  We also discussed that patient may be asked to wait in the waiting room while these tests are pending.  They understand that if they choose to leave without having the testing completed or resulted that we cannot rule out acute life threatening illnesses and the risks involved could lead to worsening condition, permanent disability or even death.

## 2025-04-01 NOTE — H&P
HPI     Mr Bearden is a 79y male with PMHx: BPH, HtN, Parkinsons, HLD, Inguinal hernia, lumbar spondylosis, glaucoma, OA, Paget's disease who presented to the ED today with his son for c/o Dav leg weakness and right leg pain.  Per patient's son patient normally ambulates freely around his home and outside without any assistive devices.  Son states that over the past few days patient has been declining and his legs are getting weaker and he is unable to ambulate.  Patient and son denies any recent fall, injuries or accidents.  Also no complaint of any recent acute illnesses.   They would like patient evaluated by PT/OT.    While in the ED Patient vitals were stable and afebrile.  Labs were also WNL, urine negative for UTI, right hip xray shows no acute abnormality, Right femur shows the same and recognizes the pagets and OA.  Patient to be admitted observation for weakness and gait instability    ROS/EXAM     Review of Systems   Constitutional: Negative.    HENT: Negative.     Eyes: Negative.    Cardiovascular: Negative.    Gastrointestinal: Negative.    Genitourinary: Negative.    Musculoskeletal:  Positive for gait problem.   Skin: Negative.    Neurological:  Positive for weakness.   All other systems reviewed and are negative.    Physical Exam  Vitals reviewed.   Constitutional:       Appearance: Normal appearance.   HENT:      Head: Normocephalic.      Mouth/Throat:      Mouth: Mucous membranes are dry.   Eyes:      Extraocular Movements: Extraocular movements intact.      Conjunctiva/sclera: Conjunctivae normal.      Pupils: Pupils are equal, round, and reactive to light.   Cardiovascular:      Rate and Rhythm: Normal rate and  regular rhythm.      Pulses: Normal pulses.      Heart sounds: Normal heart sounds, S1 normal and S2 normal.   Pulmonary:      Effort: Pulmonary effort is normal.      Breath sounds: Normal breath sounds and air entry.   Abdominal:      General: Abdomen is flat. Bowel sounds are normal.      Palpations: Abdomen is soft.   Musculoskeletal:         General: Normal range of motion.      Cervical back: Full passive range of motion without pain and normal range of motion.      Right lower leg: Edema present.      Left lower leg: Edema present.   Skin:     General: Skin is warm and dry.   Neurological:      General: No focal deficit present.      Mental Status: He is alert and oriented to person, place, and time. Mental status is at baseline.   Psychiatric:         Attention and Perception: Attention normal.         Mood and Affect: Mood normal.         Speech: Speech normal.         Histories     Past Medical History:   Diagnosis Date    Acquired spondylolisthesis     Arthritis     BPH with obstruction/lower urinary tract symptoms     Chronic constipation     Foreskin problem     Gait instability     H/O echocardiogram 02/24/2023    CONCLUSIONS: 1. Left ventricular systolic function is normal with a 55-60% estimated ejection fraction. 2. No left ventricular thrombus visualized. 3. RVSP within normal limits. 4. There is plaque visualized in the ascending aorta.    Hyperlipidemia     Hypertension     Overweight     Paget's bone disease     Parkinson disease (Multi)     Phimosis     PVD (peripheral vascular disease) (CMS-[facility])     Tremor     Venous insufficiency      Past Surgical History:   Procedure Laterality Date    PROSTATE SURGERY  05/13/2022     Family History   Problem Relation Name Age of Onset    No Known Problems Mother      No Known Problems Father      Heart disease Sister        reports that he has never smoked. He has never used smokeless tobacco. He reports that he does not currently use alcohol after a past  "usage of about 1.0 standard drink of alcohol per week. He reports that he does not use drugs.    Vitals/LABS/RESULTS     Last Recorded Vitals  Blood pressure 134/72, pulse 82, temperature 36.6 °C (97.8 °F), temperature source Oral, resp. rate 16, height 1.676 m (5' 6\"), weight 74.8 kg (165 lb), SpO2 98%.  Intake/Output last 3 Shifts:  No intake/output data recorded.    Relevant Results  Lab Results   Component Value Date    WBC 7.5 04/01/2025    HGB 13.3 (L) 04/01/2025    HCT 37.4 (L) 04/01/2025    MCV 86 04/01/2025     04/01/2025      Lab Results   Component Value Date    GLUCOSE 101 (H) 04/01/2025    CALCIUM 10.3 04/01/2025     04/01/2025    K 3.6 04/01/2025    CO2 31 04/01/2025     04/01/2025    BUN 13 04/01/2025    CREATININE 1.10 04/01/2025     Imaging  XR hip right with pelvis when performed 4+ views    Result Date: 4/1/2025  No acute abnormality. Paget's disease in the proximal femur. Mild osteoarthritis     MACRO: None   Signed by: Andrew Lew 4/1/2025 2:36 PM Dictation workstation:   [id number]    XR femur right 2+ views    Result Date: 4/1/2025  No acute abnormality. Paget's disease in the proximal femur. Mild osteoarthritis     MACRO: None   Signed by: Andrew Lew 4/1/2025 2:36 PM Dictation workstation:   [id number]    XR knee right 4+ views    Result Date: 4/1/2025  No acute abnormality. Paget's disease in the proximal femur. Mild osteoarthritis     MACRO: None   Signed by: Andrew Lew 4/1/2025 2:36 PM Dictation workstation:   [id number]     Cardiology, Vascular, and Other Imaging  No other imaging results found for the past 7 days      Medications     Scheduled medications  carbidopa-levodopa, 1 tablet, oral, TID  dorzolamide-timoloL, 1 drop, Both Eyes, BID  enoxaparin, 40 mg, subcutaneous, q24h  fluticasone, 1 spray, Each Nostril, Daily  furosemide, 40 mg, oral, Daily  latanoprost, 1 drop, Both Eyes, Nightly  sennosides-docusate sodium, 2 tablet, oral, " BID  simvastatin, 40 mg, oral, Nightly      Continuous medications     PRN medications      PLAN     Mr Bearden is a 79y male with PMHx: BPH, HtN, Parkinsons, HLD, Inguinal hernia, lumbar spondylosis, glaucoma, OA, Paget's disease who presented to the ED today with his son for c/o Dva leg weakness and right leg pain.  Per patient's son patient normally ambulates freely around his home and outside without any assistive devices.  Son states that over the past few days patient has been declining and his legs are getting weaker and he is unable to ambulate.  Patient and son denies any recent fall, injuries or accidents.  Also no complaint of any recent acute illnesses.   They would like patient evaluated by PT/OT.    While in the ED Patient vitals were stable and afebrile.  Labs were also WNL, urine negative for UTI, right hip xray shows no acute abnormality, Right femur shows the same and recognizes the pagets and OA.  Patient to be admitted observation for weakness and gait instability  Assessment/Plan:    Weakness   Gait instability  Pagets  OA  -will order pt/ot eval and treat  -SW for placement  -Pain: Tylenols scheduled  -voltaren gel/lidocaine to right leg    HTN  HLD  - c/w home lasix 40mg daily  -c/w home simvastatin 40mg daily    Parkinsons  -c/w home Carbidopa/Levodopa  tid    Glaucoma  -c/w home Cosopt both eyes bid  -c/w home latanoprost both eyes at bedtime    Erectile dysfunction  -hold home viagra    Fluids: monitor and replete as needed  Electrolytes: monitor and replete as needed  Nutrition: Regular diet   GI prophylaxis: as needed  DVT prophylaxis: SCD/lovenox  Code Status: full code  PCP  Pharmacy    Disposition:  Admitted for above diagnoses. Plan per above. Anticipate observation stay      Treva Serrano, APRN-CNP         I spent 75 minutes in the professional and overall care on this encounter before, during and after the visit, examining the patient, reviewing labs, writing orders and  documenting the note      This note was transcribed using the Dragon Dictation system. There may be grammatical, punctuation, or verbiage errors that can occur with voice recognition programs.

## 2025-04-01 NOTE — ED PROVIDER NOTES
Emergency Department Provider Note        History of Present Illness     History provided by: Patient and Family Member  Limitations to History: None  External Records Reviewed with Brief Summary: Previous ED note from 3/22/2025 showing past medical history and chief complaint of weakness and leg swelling    HPI:  Yaniv Bearden is a 79 y.o. male with past medical history significant for Paget's bone disease, BPHs/p HoLEP, phimosis s/p circumcision, HTN, Parkinson's, HLD, inguinal hernia, chronic sinusitis, lumbar spondylosis/hip OA presenting to the emergency department due to right hip and leg pain.  Patient has been dealing with difficulty ambulating for approximately 1 month since a previous fall.  His pain acutely worsened last night when he was trying to do ADLs around the house.  Patient lives with a friend/landlord/ex-girlfriend/primary caregiver however there are stairs and he lives on the top level of the duplex normally and he normally ambulates without assistance.  Patient and son state that this morning is now requiring significant assistance for ambulation.  Reading a home health service note from 3/26/2025 shows that this is not necessarily a new process and his caregiver has stated in the past that she is unable to care for him any longer.  He is complaining of aching pain in both the right knee and right hip that make it difficult to place weight on it.  He says he has been favoring that left leg now and now his left knee is beginning to become sore as well.  Patient denies any headache, chest pain, upper respiratory symptoms, shortness of breath, abdominal pain, nausea/vomiting, diarrhea, numbness or tingling.  He does note that his bowel movements are much less frequent than usual.    Physical Exam   Triage vitals:  T 36.6 °C (97.8 °F)  HR 82  /72  RR 16  O2 98 % None (Room air)    Physical Exam  Constitutional:       Appearance: Normal appearance.   HENT:      Head: Normocephalic and  atraumatic.      Nose: Nose normal.      Mouth/Throat:      Mouth: Mucous membranes are moist.   Cardiovascular:      Rate and Rhythm: Normal rate and regular rhythm.      Pulses: Normal pulses.      Heart sounds: Normal heart sounds.   Pulmonary:      Effort: Pulmonary effort is normal.      Breath sounds: Normal breath sounds.   Abdominal:      General: Abdomen is protuberant.      Palpations: Abdomen is soft.      Tenderness: There is no abdominal tenderness.   Musculoskeletal:      Cervical back: Normal range of motion.      Right lower leg: Edema present.      Left lower leg: Edema present.      Comments: Full passive range of motion.  Left lower extremity more edematous than right with 2+ pitting edema to the knee   Skin:     General: Skin is warm.      Capillary Refill: Capillary refill takes less than 2 seconds.   Neurological:      General: No focal deficit present.      Mental Status: He is alert.      Motor: Weakness present.      Gait: Gait abnormal.      Comments: Patient with severe gait instability, 4/5 weakness bl LE          Medical Decision Making & ED Course   Medical Decision Making:  Yaniv Bearden is a 79 y.o. male with past medical history significant for BPHs/p HoLEP, phimosis s/p circumcision, HTN, Parkinson's, HLD, inguinal hernia, chronic sinusitis, lumbar spondylosis/hip OA presenting to the emergency department due to right hip and leg pain causing ambulation difficulties.  Patient is hemodynamically stable on presentation.  Patient has great difficulty even transferring from bed to standing, severe instability on standing and was unable to ambulate without significant assistance.  Patient lives alone palpable duplex with a friend/landlord/ex-girlfriend/primary caregiver who is feeling like she cannot care for him adequately any longer.  X-rays of hip, femur, knee showing known Paget's disease and mild osteoarthritis but no acute fracture or dislocation.  Patient's labs showing a mild  normocytic anemia appears to be his baseline and no significant electrolyte abnormalities.  Patient does have bilateral leg swelling however this has been noted on previous examinations and patient has no calf pain or skin changes therefore very low clinical suspicion for DVT and no orthopnea or adventitious lung sounds therefore low suspicion for heart failure.  Discussion had with patient about a PT OT evaluation and placement and patient and son are both agreeable.     Patient would also benefit from beginning of a bisphosphonate for his Paget's disease. Discussed with pharmacy in the ED who recommended beginning alendronate 40 mg for 6 months on an outpatient basis or one-time infusion of 5 mg of zoledronic acid at an outpatient infusion center.   ----      Differential diagnoses considered include but are not limited to: Deconditioning, subacute/chronic fracture, osteoarthritis, DVT.     Social Determinants of Health which Significantly Impact Care: None identified The following actions were taken to address these social determinants: n/a    EKG Independent Interpretation: EKG not obtained    Independent Result Review and Interpretation: Relevant laboratory and radiographic results were reviewed and independently interpreted by myself.  As necessary, they are commented on in the ED Course.    Chronic conditions affecting the patient's care: As documented above in MDM    The patient was discussed with the following consultants/services: None    Care Considerations: As documented above in [facility]    ED Course:  ED Course as of 04/01/25 1716   Tue Apr 01, 2025   1439 XR hip right with pelvis when performed 4+ views  No acute abnormality.  Paget's disease in the proximal femur. Mild osteoarthritis   [IS]      ED Course User Index  [IS] Varun Kong MD         Diagnoses as of 04/01/25 1716   Gait instability   Paget's disease of right femur     Disposition   Admission to medicine for PT OT eval and  placement    Procedures   Procedures    Patient seen and discussed with ED attending physician.    Varun Kong MD  Emergency Medicine     Varun Kong MD  Resident  04/01/25 0262

## 2025-04-01 NOTE — PROGRESS NOTES
Yaniv Bearden is a 79 y.o. male on day 0 of admission     TCC introduced self and role in care to patient. Pt reports he lives in a house with a friend/care giver. Pt reports his caregiver has been assisting him with bathing and dressing, as well as tranportation as needed. There is a dog in the residence; it is his caregiver's, and she takes care of the dog. Pt also reports his son lives in [place] is a support as well. Pt denies using any DME. PCP is Dr Stover (with ) and insurance is [employer]. Pt reports he was 77 the first time he ever had to go into the hospital. Pt reporting he had a fall in the snow in February and mobility has been more difficult since then. TCC advised PT/OT evals have been ordered  and that can help to determine level of therapy needed for pt to recover. Pt verbalized understanding. TCC will continue to follow patient for discharge planning.       04/01/25 3490   Discharge Planning   Living Arrangements Other (Comment)  (caregiver)   Support Systems Friends/neighbors;Children   Assistance Needed Needing assistance with bathing and dressing.   Type of Residence Private residence   Number of Stairs to Enter Residence 1   Number of Stairs Within Residence 13   Do you have animals or pets at home? Yes   Type of Animals or Pets 1 dog   Who is requesting discharge planning? Provider   Expected Discharge Disposition SNF   Does the patient need discharge transport arranged? Yes   RoundTrip coordination needed? Yes   Financial Resource Strain   How hard is it for you to pay for the very basics like food, housing, medical care, and heating? Not very   Housing Stability   In the last 12 months, was there a time when you were not able to pay the mortgage or rent on time? N   In the past 12 months, how many times have you moved where you were living? 0   At any time in the past 12 months, were you homeless or living in a shelter (including now)? N   Transportation Needs   In the  past 12 months, has lack of transportation kept you from medical appointments or from getting medications? no   In the past 12 months, has lack of transportation kept you from meetings, work, or from getting things needed for daily living? No   Stroke Family Assessment   Stroke Family Assessment Needed No   Intensity of Service   Intensity of Service 0-30 min         Sowmya HINTON

## 2025-04-01 NOTE — ED TRIAGE NOTES
Pt presented to ED from home via EMS for c/o worsening Lhip/leg pain that is acute on chronic. Pt has HHC that comes but has had decrease in mobility.

## 2025-04-02 ENCOUNTER — HOME CARE VISIT (OUTPATIENT)
Dept: HOME HEALTH SERVICES | Facility: HOME HEALTH | Age: 80
End: 2025-04-02
Payer: MEDICARE

## 2025-04-02 LAB
ALBUMIN SERPL BCP-MCNC: 3.8 G/DL (ref 3.4–5)
ANION GAP SERPL CALC-SCNC: 11 MMOL/L (ref 10–20)
BUN SERPL-MCNC: 12 MG/DL (ref 6–23)
CALCIUM SERPL-MCNC: 9.7 MG/DL (ref 8.6–10.6)
CHLORIDE SERPL-SCNC: 105 MMOL/L (ref 98–107)
CO2 SERPL-SCNC: 30 MMOL/L (ref 21–32)
CREAT SERPL-MCNC: 0.95 MG/DL (ref 0.5–1.3)
EGFRCR SERPLBLD CKD-EPI 2021: 80 ML/MIN/1.73M*2
ERYTHROCYTE [DISTWIDTH] IN BLOOD BY AUTOMATED COUNT: 12.8 % (ref 11.5–14.5)
GLUCOSE SERPL-MCNC: 82 MG/DL (ref 74–99)
HCT VFR BLD AUTO: 38.7 % (ref 41–52)
HGB BLD-MCNC: 12.9 G/DL (ref 13.5–17.5)
MCH RBC QN AUTO: 30.4 PG (ref 26–34)
MCHC RBC AUTO-ENTMCNC: 33.3 G/DL (ref 32–36)
MCV RBC AUTO: 91 FL (ref 80–100)
NRBC BLD-RTO: 0 /100 WBCS (ref 0–0)
PHOSPHATE SERPL-MCNC: 3.3 MG/DL (ref 2.5–4.9)
PLATELET # BLD AUTO: 161 X10*3/UL (ref 150–450)
POTASSIUM SERPL-SCNC: 3 MMOL/L (ref 3.5–5.3)
RBC # BLD AUTO: 4.25 X10*6/UL (ref 4.5–5.9)
SODIUM SERPL-SCNC: 143 MMOL/L (ref 136–145)
WBC # BLD AUTO: 6.3 X10*3/UL (ref 4.4–11.3)

## 2025-04-02 PROCEDURE — 85027 COMPLETE CBC AUTOMATED: CPT | Performed by: NURSE PRACTITIONER

## 2025-04-02 PROCEDURE — 2500000005 HC RX 250 GENERAL PHARMACY W/O HCPCS: Performed by: NURSE PRACTITIONER

## 2025-04-02 PROCEDURE — 97165 OT EVAL LOW COMPLEX 30 MIN: CPT | Mod: GO

## 2025-04-02 PROCEDURE — G0378 HOSPITAL OBSERVATION PER HR: HCPCS

## 2025-04-02 PROCEDURE — 80069 RENAL FUNCTION PANEL: CPT | Performed by: NURSE PRACTITIONER

## 2025-04-02 PROCEDURE — 2500000001 HC RX 250 WO HCPCS SELF ADMINISTERED DRUGS (ALT 637 FOR MEDICARE OP): Performed by: NURSE PRACTITIONER

## 2025-04-02 PROCEDURE — 36415 COLL VENOUS BLD VENIPUNCTURE: CPT | Performed by: NURSE PRACTITIONER

## 2025-04-02 PROCEDURE — 2500000002 HC RX 250 W HCPCS SELF ADMINISTERED DRUGS (ALT 637 FOR MEDICARE OP, ALT 636 FOR OP/ED): Performed by: NURSE PRACTITIONER

## 2025-04-02 PROCEDURE — 96372 THER/PROPH/DIAG INJ SC/IM: CPT | Performed by: NURSE PRACTITIONER

## 2025-04-02 PROCEDURE — 2500000004 HC RX 250 GENERAL PHARMACY W/ HCPCS (ALT 636 FOR OP/ED): Performed by: NURSE PRACTITIONER

## 2025-04-02 PROCEDURE — 97162 PT EVAL MOD COMPLEX 30 MIN: CPT | Mod: GP | Performed by: PHYSICAL THERAPIST

## 2025-04-02 PROCEDURE — 99232 SBSQ HOSP IP/OBS MODERATE 35: CPT | Performed by: STUDENT IN AN ORGANIZED HEALTH CARE EDUCATION/TRAINING PROGRAM

## 2025-04-02 RX ORDER — OXYCODONE HYDROCHLORIDE 5 MG/1
5 TABLET ORAL EVERY 4 HOURS PRN
Status: DISCONTINUED | OUTPATIENT
Start: 2025-04-02 | End: 2025-04-04 | Stop reason: HOSPADM

## 2025-04-02 RX ADMIN — SIMVASTATIN 40 MG: 20 TABLET, FILM COATED ORAL at 21:47

## 2025-04-02 RX ADMIN — SENNOSIDES AND DOCUSATE SODIUM 2 TABLET: 50; 8.6 TABLET ORAL at 09:01

## 2025-04-02 RX ADMIN — CARBIDOPA AND LEVODOPA 1 TABLET: 25; 100 TABLET ORAL at 15:29

## 2025-04-02 RX ADMIN — ENOXAPARIN SODIUM 40 MG: 100 INJECTION SUBCUTANEOUS at 17:51

## 2025-04-02 RX ADMIN — FLUTICASONE PROPIONATE 1 SPRAY: 50 SPRAY, METERED NASAL at 11:28

## 2025-04-02 RX ADMIN — ACETAMINOPHEN 975 MG: 325 TABLET ORAL at 17:51

## 2025-04-02 RX ADMIN — CARBIDOPA AND LEVODOPA 1 TABLET: 25; 100 TABLET ORAL at 09:01

## 2025-04-02 RX ADMIN — LIDOCAINE 4% 1 PATCH: 40 PATCH TOPICAL at 17:51

## 2025-04-02 RX ADMIN — DICLOFENAC SODIUM 4 G: 10 GEL TOPICAL at 13:11

## 2025-04-02 RX ADMIN — DORZOLAMIDE HYDROCHLORIDE AND TIMOLOL MALEATE 1 DROP: 20; 5 SOLUTION OPHTHALMIC at 21:47

## 2025-04-02 RX ADMIN — CARBIDOPA AND LEVODOPA 1 TABLET: 25; 100 TABLET ORAL at 21:47

## 2025-04-02 RX ADMIN — DICLOFENAC SODIUM 4 G: 10 GEL TOPICAL at 17:52

## 2025-04-02 RX ADMIN — DORZOLAMIDE HYDROCHLORIDE AND TIMOLOL MALEATE 1 DROP: 20; 5 SOLUTION OPHTHALMIC at 11:28

## 2025-04-02 RX ADMIN — ACETAMINOPHEN 975 MG: 325 TABLET ORAL at 02:05

## 2025-04-02 RX ADMIN — DICLOFENAC SODIUM 4 G: 10 GEL TOPICAL at 09:02

## 2025-04-02 RX ADMIN — FUROSEMIDE 40 MG: 40 TABLET ORAL at 09:01

## 2025-04-02 RX ADMIN — ACETAMINOPHEN 975 MG: 325 TABLET ORAL at 10:01

## 2025-04-02 SDOH — SOCIAL STABILITY: SOCIAL INSECURITY: WITHIN THE LAST YEAR, HAVE YOU BEEN HUMILIATED OR EMOTIONALLY ABUSED IN OTHER WAYS BY YOUR PARTNER OR EX-PARTNER?: NO

## 2025-04-02 SDOH — ECONOMIC STABILITY: TRANSPORTATION INSECURITY: IN THE PAST 12 MONTHS, HAS LACK OF TRANSPORTATION KEPT YOU FROM MEDICAL APPOINTMENTS OR FROM GETTING MEDICATIONS?: NO

## 2025-04-02 SDOH — ECONOMIC STABILITY: FOOD INSECURITY: WITHIN THE PAST 12 MONTHS, THE FOOD YOU BOUGHT JUST DIDN'T LAST AND YOU DIDN'T HAVE MONEY TO GET MORE.: NEVER TRUE

## 2025-04-02 SDOH — SOCIAL STABILITY: SOCIAL INSECURITY
WITHIN THE LAST YEAR, HAVE YOU BEEN KICKED, HIT, SLAPPED, OR OTHERWISE PHYSICALLY HURT BY YOUR PARTNER OR EX-PARTNER?: NO

## 2025-04-02 SDOH — SOCIAL STABILITY: SOCIAL INSECURITY: WITHIN THE LAST YEAR, HAVE YOU BEEN AFRAID OF YOUR PARTNER OR EX-PARTNER?: NO

## 2025-04-02 SDOH — SOCIAL STABILITY: SOCIAL INSECURITY: DO YOU FEEL UNSAFE GOING BACK TO THE PLACE WHERE YOU ARE LIVING?: NO

## 2025-04-02 SDOH — ECONOMIC STABILITY: FOOD INSECURITY: HOW HARD IS IT FOR YOU TO PAY FOR THE VERY BASICS LIKE FOOD, HOUSING, MEDICAL CARE, AND HEATING?: NOT VERY HARD

## 2025-04-02 SDOH — SOCIAL STABILITY: SOCIAL INSECURITY: DO YOU FEEL ANYONE HAS EXPLOITED OR TAKEN ADVANTAGE OF YOU FINANCIALLY OR OF YOUR PERSONAL PROPERTY?: NO

## 2025-04-02 SDOH — SOCIAL STABILITY: SOCIAL INSECURITY: WERE YOU ABLE TO COMPLETE ALL THE BEHAVIORAL HEALTH SCREENINGS?: YES

## 2025-04-02 SDOH — ECONOMIC STABILITY: INCOME INSECURITY: IN THE PAST 12 MONTHS HAS THE ELECTRIC, GAS, OIL, OR WATER COMPANY THREATENED TO SHUT OFF SERVICES IN YOUR HOME?: NO

## 2025-04-02 SDOH — ECONOMIC STABILITY: FOOD INSECURITY: WITHIN THE PAST 12 MONTHS, YOU WORRIED THAT YOUR FOOD WOULD RUN OUT BEFORE YOU GOT THE MONEY TO BUY MORE.: NEVER TRUE

## 2025-04-02 SDOH — SOCIAL STABILITY: SOCIAL INSECURITY: HAS ANYONE EVER THREATENED TO HURT YOUR FAMILY OR YOUR PETS?: NO

## 2025-04-02 SDOH — ECONOMIC STABILITY: HOUSING INSECURITY: AT ANY TIME IN THE PAST 12 MONTHS, WERE YOU HOMELESS OR LIVING IN A SHELTER (INCLUDING NOW)?: NO

## 2025-04-02 SDOH — SOCIAL STABILITY: SOCIAL INSECURITY: HAVE YOU HAD THOUGHTS OF HARMING ANYONE ELSE?: NO

## 2025-04-02 SDOH — SOCIAL STABILITY: SOCIAL INSECURITY: ARE YOU OR HAVE YOU BEEN THREATENED OR ABUSED PHYSICALLY, EMOTIONALLY, OR SEXUALLY BY ANYONE?: NO

## 2025-04-02 SDOH — ECONOMIC STABILITY: HOUSING INSECURITY: IN THE LAST 12 MONTHS, WAS THERE A TIME WHEN YOU WERE NOT ABLE TO PAY THE MORTGAGE OR RENT ON TIME?: NO

## 2025-04-02 SDOH — SOCIAL STABILITY: SOCIAL INSECURITY: HAVE YOU HAD ANY THOUGHTS OF HARMING ANYONE ELSE?: NO

## 2025-04-02 SDOH — SOCIAL STABILITY: SOCIAL INSECURITY: DOES ANYONE TRY TO KEEP YOU FROM HAVING/CONTACTING OTHER FRIENDS OR DOING THINGS OUTSIDE YOUR HOME?: NO

## 2025-04-02 SDOH — SOCIAL STABILITY: SOCIAL INSECURITY
WITHIN THE LAST YEAR, HAVE YOU BEEN RAPED OR FORCED TO HAVE ANY KIND OF SEXUAL ACTIVITY BY YOUR PARTNER OR EX-PARTNER?: NO

## 2025-04-02 SDOH — ECONOMIC STABILITY: HOUSING INSECURITY: IN THE PAST 12 MONTHS, HOW MANY TIMES HAVE YOU MOVED WHERE YOU WERE LIVING?: 0

## 2025-04-02 SDOH — SOCIAL STABILITY: SOCIAL INSECURITY: ARE THERE ANY APPARENT SIGNS OF INJURIES/BEHAVIORS THAT COULD BE RELATED TO ABUSE/NEGLECT?: NO

## 2025-04-02 SDOH — SOCIAL STABILITY: SOCIAL INSECURITY: ABUSE: ADULT

## 2025-04-02 ASSESSMENT — COGNITIVE AND FUNCTIONAL STATUS - GENERAL
HELP NEEDED FOR BATHING: A LITTLE
HELP NEEDED FOR BATHING: A LITTLE
DRESSING REGULAR LOWER BODY CLOTHING: A LITTLE
DAILY ACTIVITIY SCORE: 18
TURNING FROM BACK TO SIDE WHILE IN FLAT BAD: A LITTLE
DAILY ACTIVITIY SCORE: 16
DRESSING REGULAR UPPER BODY CLOTHING: A LITTLE
CLIMB 3 TO 5 STEPS WITH RAILING: A LITTLE
MOVING FROM LYING ON BACK TO SITTING ON SIDE OF FLAT BED WITH BEDRAILS: A LITTLE
MOVING TO AND FROM BED TO CHAIR: A LITTLE
STANDING UP FROM CHAIR USING ARMS: A LITTLE
DRESSING REGULAR UPPER BODY CLOTHING: A LITTLE
DAILY ACTIVITIY SCORE: 20
EATING MEALS: A LITTLE
MOVING FROM LYING ON BACK TO SITTING ON SIDE OF FLAT BED WITH BEDRAILS: A LITTLE
CLIMB 3 TO 5 STEPS WITH RAILING: A LOT
TOILETING: A LITTLE
DRESSING REGULAR LOWER BODY CLOTHING: A LITTLE
PERSONAL GROOMING: A LITTLE
TURNING FROM BACK TO SIDE WHILE IN FLAT BAD: A LOT
MOBILITY SCORE: 14
DRESSING REGULAR LOWER BODY CLOTHING: A LITTLE
TURNING FROM BACK TO SIDE WHILE IN FLAT BAD: A LITTLE
MOBILITY SCORE: 18
WALKING IN HOSPITAL ROOM: A LOT
WALKING IN HOSPITAL ROOM: A LITTLE
TOILETING: A LOT
MOVING TO AND FROM BED TO CHAIR: A LOT
MOBILITY SCORE: 12
STANDING UP FROM CHAIR USING ARMS: A LOT
TURNING FROM BACK TO SIDE WHILE IN FLAT BAD: A LITTLE
STANDING UP FROM CHAIR USING ARMS: A LOT
DAILY ACTIVITIY SCORE: 18
TOILETING: A LITTLE
HELP NEEDED FOR BATHING: A LOT
STANDING UP FROM CHAIR USING ARMS: A LOT
HELP NEEDED FOR BATHING: A LITTLE
PATIENT BASELINE BEDBOUND: NO
CLIMB 3 TO 5 STEPS WITH RAILING: A LOT
PERSONAL GROOMING: A LITTLE
WALKING IN HOSPITAL ROOM: A LOT
CLIMB 3 TO 5 STEPS WITH RAILING: TOTAL
MOVING FROM LYING ON BACK TO SITTING ON SIDE OF FLAT BED WITH BEDRAILS: A LITTLE
EATING MEALS: A LITTLE
MOVING TO AND FROM BED TO CHAIR: A LOT
DRESSING REGULAR UPPER BODY CLOTHING: A LITTLE
DRESSING REGULAR UPPER BODY CLOTHING: A LITTLE
WALKING IN HOSPITAL ROOM: A LOT
PERSONAL GROOMING: A LITTLE
TOILETING: A LITTLE
MOBILITY SCORE: 14
MOVING TO AND FROM BED TO CHAIR: A LOT
MOVING FROM LYING ON BACK TO SITTING ON SIDE OF FLAT BED WITH BEDRAILS: A LITTLE
DRESSING REGULAR LOWER BODY CLOTHING: A LOT

## 2025-04-02 ASSESSMENT — PAIN - FUNCTIONAL ASSESSMENT
PAIN_FUNCTIONAL_ASSESSMENT: 0-10

## 2025-04-02 ASSESSMENT — PATIENT HEALTH QUESTIONNAIRE - PHQ9
2. FEELING DOWN, DEPRESSED OR HOPELESS: NOT AT ALL
1. LITTLE INTEREST OR PLEASURE IN DOING THINGS: NOT AT ALL
SUM OF ALL RESPONSES TO PHQ9 QUESTIONS 1 & 2: 0

## 2025-04-02 ASSESSMENT — ACTIVITIES OF DAILY LIVING (ADL)
HEARING - LEFT EAR: FUNCTIONAL
LACK_OF_TRANSPORTATION: NO
ADL_ASSISTANCE: INDEPENDENT
ASSISTIVE_DEVICE: WALKER;CANE
JUDGMENT_ADEQUATE_SAFELY_COMPLETE_DAILY_ACTIVITIES: YES
TOILETING: NEEDS ASSISTANCE
ADLS_ADDRESSED: NO
HEARING - RIGHT EAR: FUNCTIONAL
LACK_OF_TRANSPORTATION: NO
ADEQUATE_TO_COMPLETE_ADL: YES
BATHING_ASSISTANCE: MODERATE
WALKS IN HOME: NEEDS ASSISTANCE
PATIENT'S MEMORY ADEQUATE TO SAFELY COMPLETE DAILY ACTIVITIES?: YES
BATHING: NEEDS ASSISTANCE
LACK_OF_TRANSPORTATION: NO
GROOMING: INDEPENDENT
DRESSING YOURSELF: NEEDS ASSISTANCE
FEEDING YOURSELF: INDEPENDENT

## 2025-04-02 ASSESSMENT — LIFESTYLE VARIABLES
AUDIT-C TOTAL SCORE: 0
SKIP TO QUESTIONS 9-10: 1
HOW MANY STANDARD DRINKS CONTAINING ALCOHOL DO YOU HAVE ON A TYPICAL DAY: PATIENT DOES NOT DRINK
HOW OFTEN DO YOU HAVE 6 OR MORE DRINKS ON ONE OCCASION: NEVER
AUDIT-C TOTAL SCORE: 0
HOW OFTEN DO YOU HAVE A DRINK CONTAINING ALCOHOL: NEVER

## 2025-04-02 ASSESSMENT — PAIN SCALES - GENERAL
PAINLEVEL_OUTOF10: 0 - NO PAIN
PAINLEVEL_OUTOF10: 0 - NO PAIN
PAINLEVEL_OUTOF10: 7
PAINLEVEL_OUTOF10: 0 - NO PAIN

## 2025-04-02 NOTE — CARE PLAN
The patient's goals for the shift include      The clinical goals for the shift include patient will remain safe and free from falls and/or injury over this shift.      Problem: Pain - Adult  Goal: Verbalizes/displays adequate comfort level or baseline comfort level  Outcome: Progressing     Problem: Safety - Adult  Goal: Free from fall injury  Outcome: Progressing     Problem: Discharge Planning  Goal: Discharge to home or other facility with appropriate resources  Outcome: Progressing     Problem: Chronic Conditions and Co-morbidities  Goal: Patient's chronic conditions and co-morbidity symptoms are monitored and maintained or improved  Outcome: Progressing     Problem: Nutrition  Goal: Nutrient intake appropriate for maintaining nutritional needs  Outcome: Progressing     Problem: Fall/Injury  Goal: Not fall by end of shift  Outcome: Progressing  Goal: Be free from injury by end of the shift  Outcome: Progressing  Goal: Verbalize understanding of personal risk factors for fall in the hospital  Outcome: Progressing  Goal: Verbalize understanding of risk factor reduction measures to prevent injury from fall in the home  Outcome: Progressing  Goal: Use assistive devices by end of the shift  Outcome: Progressing  Goal: Pace activities to prevent fatigue by end of the shift  Outcome: Progressing

## 2025-04-02 NOTE — PROGRESS NOTES
Occupational Therapy    Evaluation    Patient Name: Yaniv Bearden  MRN: [id number]  Today's Date: 4/2/2025  Room: 2025/2025-A  Time Calculation  Start Time: 1352  Stop Time: 1414  Time Calculation (min): 22 min    Assessment  IP OT Assessment  OT Assessment: Patient below functional baseline with increased time and assist for mobility impacting his ability to care for himself. Recommend MOD intensity OT services when medically appropriate.  Prognosis: Good  Barriers to Discharge Home: Caregiver assistance, Physical needs  Caregiver Assistance: Caregiver assistance needed per identified barriers - however, level of patient's required assistance exceeds assistance available at home  Physical Needs: Intermittent mobility assistance needed, Intermittent ADL assistance needed  Evaluation/Treatment Tolerance: Patient tolerated treatment well  End of Session Communication: Bedside nurse  End of Session Patient Position: Bed, 3 rail up, Alarm on  Plan:  Inpatient Plan  Treatment Interventions: ADL retraining, Visual perceptual retraining, Functional transfer training, UE strengthening/ROM, Endurance training, Cognitive reorientation, Patient/family training, Equipment evaluation/education, Neuromuscular reeducation, Fine motor coordination activities, Compensatory technique education, UE splinting, Continued evaluation  OT Frequency: 2 times per week  OT Discharge Recommendations: Moderate intensity level of continued care  OT Recommended Transfer Status: Assist of 1  OT - OK to Discharge: Yes (when medically appropriate)  OT Assessment  OT Assessment Results: Decreased ADL status, Decreased endurance, Decreased functional mobility  Prognosis: Good  Evaluation/Treatment Tolerance: Patient tolerated treatment well  Strengths: Ability to acquire knowledge, Attitude of self, Premorbid level of function, Support of Caregivers  Barriers to Participation: Comorbidities    Subjective   Current Problem:  1. Paget's disease of right  femur        2. Gait instability          General:  Reason for Referral: 78 y/o M admitted 4/1 with worsening Right hip/leg pain, difficulty ambulating x 1 month after fall;  Past Medical History Relevant to Rehab: Paget's bone disease, BPH  HTN, Parkinson's, HLD, inguinal hernia, chronic sinusitis, lumbar spondylosis/hip OA, glaucoma, erectile dysfunction  Prior to Session Communication:  (PT)  Patient Position Received: Up in chair, Alarm on (Patient standing up with chair alarm going off and son assisting patient in standing.)  Family/Caregiver Present: Yes  Caregiver Feedback: supportive son present  General Comment: OT arriving with chair alarm going off with patient standing with son; patient agreeable to OT assisting with transfer from chair to bed. Agreeable to OT eval following return to bed.   Precautions:  Hearing/Visual Limitations: appears WFL with glasses on.  Medical Precautions: Fall precautions  Vital Signs: Patient denied lightheadedness/dizziness with OOB activity.       Pain:  Pain Assessment  Pain Assessment: 0-10  0-10 (Numeric) Pain Score: 7  Pain Type: Acute pain  Pain Location: Hip  Lines/Tubes/Drains:  External Urinary Catheter Male (Active)   Number of days: 1         Objective   Cognition:  Arousal/Alertness: Delayed responses to stimuli  Orientation Level: Oriented X4  Following Commands: Follows one step commands without difficulty  Safety Judgment: Decreased awareness of need for assistance  Processing Speed: Delayed     Home Living:  Type of Home: House (2nd story duplex, caregiver lives on 1st floor)  Lives With: Alone (caregiver on 1st floor and supportive son lives nearby)  Home Adaptive Equipment:  (Son clarifying that patient has a regular bed that does not adjust.)  Home Layout:  (4 steps to main door + 13 steps; bed/bath on one level)  Bathroom Shower/Tub: Tub/shower unit (with grab bars and shower chair)   Prior Function:  Level of [place]: Independent with ADLs and  functional transfers  Vocational: Retired  Hand Dominance: Right  Prior Function Comments: Patient IND with ADLs, and assist for IADLs. Patient reporting he drives about 7times per month. Patient son reporting he would prefer pt to not drive. PAtient IND with functional mobility with no AD.    ADL:  Eating Assistance: Independent  Eating Deficit: Setup  Grooming Assistance: Minimal  Bathing Assistance: Moderate  UE Dressing Assistance: Minimal  LE Dressing Assistance: Maximal  Toileting Assistance with Device: Maximal    Balance:  Dynamic Standing Balance  Dynamic Standing-Level of Assistance: Minimum assistance  Static Sitting Balance  Static Sitting-Level of Assistance: Close supervision  Static Standing Balance  Static Standing-Level of Assistance: Minimum assistance  Bed Mobility/Transfers: Bed Mobility  Bed Mobility: Yes  Bed Mobility 1  Bed Mobility 1: Sitting to supine  Level of Assistance 1: Contact guard   and Transfers  Transfer: Yes  Transfer 1  Technique 1: Stand to sit, Stand pivot  Transfer Device 1:  (hand held assist)  Transfer Level of Assistance 1: Minimum assistance  Trials/Comments 1: MIN A for stand to sit and stand pivot transfer with hand held assist and min v/c for sequencing. Prior to OT arrival, pt standing with son present.    Vision: Vision - Basic Assessment  Current Vision: Wears glasses all the time    Hand Function:  Hand Function  Gross Grasp: Functional  Coordination: Functional (increased time)  Extremities: RUE   RUE : Within Functional Limits, LUE   LUE: Within Functional Limits,     Outcome Measures: [facility] Daily Activity  Putting on and taking off regular lower body clothing: A lot  Bathing (including washing, rinsing, drying): A lot  Putting on and taking off regular upper body clothing: A little  Toileting, which includes using toilet, bedpan or urinal: A lot  Taking care of personal grooming such as brushing teeth: A little  Eating Meals: None  Daily Activity - Total Score:  16      Education Documentation  Body Mechanics, taught by Viji Burks OT at 4/2/2025  3:00 PM.  Learner: Patient  Readiness: Acceptance  Method: Explanation  Response: Verbalizes Understanding  Comment: safety parameters    Precautions, taught by Viji Burks OT at 4/2/2025  3:00 PM.  Learner: Patient  Readiness: Acceptance  Method: Explanation  Response: Verbalizes Understanding  Comment: safety parameters    ADL Training, taught by Viji Burks OT at 4/2/2025  3:00 PM.  Learner: Patient  Readiness: Acceptance  Method: Explanation  Response: Verbalizes Understanding  Comment: safety parameters    Education Comments  No comments found.        Goals:     Encounter Problems       Encounter Problems (Active)       ADLs       Patient will complete LB dressing with CGA assist and min cues.   (Progressing)       Start:  04/02/25    Expected End:  04/16/25            Patient will complete toileting with SBA assist and min cues.   (Progressing)       Start:  04/02/25    Expected End:  04/16/25            Patient will complete grooming with MOD I.   (Progressing)       Start:  04/02/25    Expected End:  04/16/25               BALANCE       Patient will demo standing balance with SBA for at least 5 min in prep for standing ADLs.  (Progressing)       Start:  04/02/25    Expected End:  04/16/25               MOBILITY       Patient will complete bed mobility with MOD I.    (Progressing)       Start:  04/02/25    Expected End:  04/16/25            Pt. Will demo household distance functional mobility with SBA using LRAD.   (Progressing)       Start:  04/02/25    Expected End:  04/16/25                   TRANSFERS       Pt. Will complete stand pivot transfer with SBA assist with min cues and using LRAD.   (Progressing)       Start:  04/02/25    Expected End:  04/16/25 04/02/25 at 3:00 PM   Viji Burks OT   Rehab Office: [phone]

## 2025-04-02 NOTE — PROGRESS NOTES
04/02/25 1606   Discharge Planning   Living Arrangements Other (Comment)  (caregiver)   Support Systems Children;Other (Comment)  (caregiver)   Assistance Needed yes   Type of Residence Private residence   Who is requesting discharge planning? Provider   Expected Discharge Disposition SNF   Does the patient need discharge transport arranged? Yes   Financial Resource Strain   How hard is it for you to pay for the very basics like food, housing, medical care, and heating? Not very   Housing Stability   In the last 12 months, was there a time when you were not able to pay the mortgage or rent on time? N   At any time in the past 12 months, were you homeless or living in a shelter (including now)? N   Transportation Needs   In the past 12 months, has lack of transportation kept you from medical appointments or from getting medications? no   In the past 12 months, has lack of transportation kept you from meetings, work, or from getting things needed for daily living? No   Patient Choice   Provider Choice list and CMS website (https://medicare.gov/care-compare#search) for post-acute Quality and Resource Measure Data were provided and reviewed with: Patient   Intensity of Service   Intensity of Service 0-30 min     PCP: Osvaldo Stover   DATE OF LAST VISIT: a few months ago   PHARMACY: [facility]   RECENT FALLS: yes in 2/2025  EQUIPMENT USED IN HOME: N/A  HOME O2/CPAP/NEBS: N/A  TRANSPORT HOME: via ambulance to SNF  CURRENT HC: N/A    Address, phone and emergency contact information verified and updated. Pt states prior to admission he was independent with ADLs. TCC discussed care team reccs for MOD intensity and pt agreeable. TCC gave SNF list to pt to review. All questions and concerns answered. Will continue to follow.  1500-TCC spoke to pt's sonFrancisco via phone and made aware plan for SNF. Per pt's son, SNF list can be sent via email to [email or web address].

## 2025-04-02 NOTE — HOME HEALTH
[facility] HOMECARE SERVICES SDOH  SDOH RESOURCE NAME:[facility]  RESOURCE CONTACT:[employer] GAN( )  ELENA( )  [employer]( )  BLANE FOOD BANK( )  DIGITAL/CONNECTION( )  HOUSING( )  TRANSPORATION( )  SOCIAL CONNECTIONS(x )  STRESS/SUPPORT( )  UTLILITIES( )  DEPRESSION( )  DRUG/ALCOHOL/TOBACCO ( )  FINANCIAL STRAIN( )  FOOD INSECURITY( )  PASSPORT( )  PHONE/ADP PROGRAM( )  HOUSEHOLD/FURNITURE( )  ASSIT.LIVING( )     OTHER( x) Assitance    FOLLOW UP: YES (x ) NO( ) pt will be assit with [facility] once [facility] leaves [facility].  ADDTIONAL COMMENT:

## 2025-04-02 NOTE — CARE PLAN
The patient's goals for the shift include      The clinical goals for the shift include   Problem: Pain - Adult  Goal: Verbalizes/displays adequate comfort level or baseline comfort level  4/2/2025 1504 by Christi Pascual RN  Outcome: Progressing  4/2/2025 1430 by Christi Pascual RN  Outcome: Progressing     Problem: Safety - Adult  Goal: Free from fall injury  4/2/2025 1504 by Christi Pascual RN  Outcome: Progressing  4/2/2025 1430 by Christi Pascual RN  Outcome: Progressing     Problem: Discharge Planning  Goal: Discharge to home or other facility with appropriate resources  4/2/2025 1504 by Christi Pascual RN  Outcome: Progressing  4/2/2025 1430 by Christi Pascual RN  Outcome: Progressing     Problem: Chronic Conditions and Co-morbidities  Goal: Patient's chronic conditions and co-morbidity symptoms are monitored and maintained or improved  4/2/2025 1504 by Christi Pascual RN  Outcome: Progressing  4/2/2025 1430 by Christi Pascual RN  Outcome: Progressing     Problem: Nutrition  Goal: Nutrient intake appropriate for maintaining nutritional needs  4/2/2025 1504 by Christi Pascual RN  Outcome: Progressing  4/2/2025 1430 by Christi Pascual RN  Outcome: Progressing     Problem: Fall/Injury  Goal: Not fall by end of shift  4/2/2025 1504 by Christi Pascual RN  Outcome: Progressing  4/2/2025 1430 by Christi Pascual RN  Outcome: Progressing  Goal: Be free from injury by end of the shift  4/2/2025 1504 by Christi Pascual RN  Outcome: Progressing  4/2/2025 1430 by Christi Pascual RN  Outcome: Progressing  Goal: Verbalize understanding of personal risk factors for fall in the hospital  4/2/2025 1504 by Christi Pascual RN  Outcome: Progressing  4/2/2025 1430 by Christi Pascual RN  Outcome: Progressing  Goal: Verbalize understanding of risk factor reduction measures to prevent injury from fall in the home  4/2/2025 1504 by Christi Pascual RN  Outcome: Progressing  4/2/2025 1430 by Nazan Samara, RN  Outcome: Progressing  Goal: Use assistive devices by end of  the shift  4/2/2025 1504 by Christi Pascual RN  Outcome: Progressing  4/2/2025 1430 by Christi Pascual RN  Outcome: Progressing  Goal: Pace activities to prevent fatigue by end of the shift  4/2/2025 1504 by Christi Pascual RN  Outcome: Progressing  4/2/2025 1430 by Christi Pascual RN  Outcome: Progressing

## 2025-04-02 NOTE — PROGRESS NOTES
Physical Therapy    Physical Therapy Evaluation    Patient Name: Yaniv Bearden  MRN: [id number]  Department: [place] [address]  Room: 2025/2025-A  Today's Date: 4/2/2025   Time Calculation  Start Time: 1153  Stop Time: 1217  Time Calculation (min): 24 min    Assessment/Plan   PT Assessment  PT Assessment Results: Decreased strength, Decreased range of motion, Decreased endurance, Impaired balance, Decreased mobility, Decreased coordination  Rehab Prognosis: Good  Barriers to Discharge Home: Physical needs  Physical Needs: Stair navigation into home limited by function/safety, In-home setup navigation limited by function/safety, Ambulating household distances limited by function/safety, Intermittent mobility assistance needed, Intermittent ADL assistance needed, High falls risk due to function or environment  Evaluation/Treatment Tolerance: Patient limited by fatigue  Medical Staff Made Aware: Yes  Strengths: Ability to acquire knowledge, Attitude of self, Coping skills, Premorbid level of function, Support of Caregivers  Barriers to Participation: Other (Comment) (overall fatigue today (didn't sleep last night))  End of Session Communication: Bedside nurse (OT)  Assessment Comment: 78 yo male with hx of Parkinsons presents with recent RIght hip pain and weakness overall, significant balance deficits and high fall risk. Recommend moderate intensity therapy as pt would benefit from PT & OT (possibly SLT for hypophonia and cog assessment), lives alone, is not at baseline, is high falls risk and has equipment needs.  End of Session Patient Position: Up in chair, Alarm on  IP OR SWING BED PT PLAN  Inpatient or Swing Bed: Inpatient  PT Plan  Treatment/Interventions: Bed mobility, Transfer training, Gait training, Stair training, Balance training, Neuromuscular re-education, Strengthening, Endurance training, Therapeutic exercise, Therapeutic activity, Home exercise program, Postural re-education  PT Plan: Ongoing PT  PT  Frequency: 3 times per week  PT Discharge Recommendations: Moderate intensity level of continued care  Equipment Recommended upon Discharge: Other (comment) (wheeled walker)  PT Recommended Transfer Status: Assist x1, Assistive device (mod A +1 with walker)  PT - OK to Discharge: Yes (PT eval completed & DC recs made)    Subjective   General Visit Information:  General  Reason for Referral: Admitted 4/1 with worsening Right hip/leg pain, difficulty ambulating x 1 month after fall; dx: Pagets disease proximal Right femur, OA, Right hip pain, gait instability, weakness  Past Medical History Relevant to Rehab: Paget's bone disease, BPH  HTN, Parkinson's, HLD, inguinal hernia, chronic sinusitis, lumbar spondylosis/hip OA, glaucoma, erectile dysfunction  Family/Caregiver Present: No  Prior to Session Communication: Bedside nurse  Patient Position Received: Bed, 3 rail up, Alarm on  Preferred Learning Style: kinesthetic, verbal  General Comment: PT alert and engaged, flat affect, bradykinetic with tremors, no pain today. Pt able to take a few steps and transfer to chair with WW, too tired to walk more. Pt reports he didn't sleep much, he was a night person anyway.  Home Living:  Home Living  Type of Home: House (lives on 2nd floor of double, caregiver lives on 1st floor)  Lives With: Alone (caregiver on 1st floor)  Home Adaptive Equipment:  (bed that head adjusts/has rail, shower equipment as noted)  Home Layout: One level (4+13 steps to enter with 1 rail (pt on 2nd floor of double); bed/tub shower all on 1 floor)  Home Access: Stairs to enter with rails  Entrance Stairs-Rails:  (1)  Entrance Stairs-Number of Steps: 4+13  Bathroom Shower/Tub: Tub/shower unit  Bathroom Toilet: Standard  Bathroom Equipment: Grab bars in shower, Shower chair with back  Bathroom Accessibility: on 1 floor  Prior Level of Function:  Prior Function Per Pt/Caregiver Report  Level of [place]:  (ambulates in/outdoors no device independently,  independent stairclimbing 1 fall)  Receives Help From:  (caregiver and son (laundry, goes with them to get groceries, maybe cooking and cleaning- unclear))  ADL Assistance: Independent  Homemaking Assistance: Needs assistance  Ambulatory Assistance: Independent  Vocational: Retired (worked at [employer])  Hand Dominance: Right  Prior Function Comments: last few weeks having more difficulty due to Right hip pain after he worked on some things around house  Precautions:  Precautions  Medical Precautions: Fall precautions (Right hip/leg pain, Parkinsons)      Date/Time Vitals Session Patient Position Pulse Resp SpO2 BP MAP (mmHg)    04/02/25 1153 Post PT  Sitting  64  --  94 %  137/73  --    Vital Signs Comment: end of session, asymptomatic     Objective   Pain:  Pain Assessment  Pain Assessment: 0-10  0-10 (Numeric) Pain Score: 0 - No pain  Pain Interventions: Repositioned, Ambulation/increased activity, Rest, Therapeutic presence, Therapeutic touch  Response to Interventions:  (comfortable up in chair, no pain)  Cognition:  Cognition  Arousal/Alertness: Delayed responses to stimuli  Orientation Level: Oriented X4  Following Commands: Follows all commands and directions without difficulty  Safety Judgment: Decreased awareness of need for assistance  Problem Solving: Exceptions to WFL  Planning: Reduced planning skills  Processing Speed: Delayed    General Assessments:  General Observation  General Observation: flat affected/masked     Activity Tolerance  Endurance: Tolerates 10 - 20 min exercise with multiple rests, Other (Comment) (limited b/c didn't sleep last night, reports of Right leg weakness)    Sensation  Light Touch: Not tested (reports no numbness when asked)       Coordination  Movements are Fluid and Coordinated: No (functionally bradykinetic overall, tremulous overall)    Postural Control  Postural Control: Impaired  Posture Comment: standing with walker assisted: forward head, rounded shoulders, retro  lean    Static Sitting Balance  Static Sitting-Balance Support: Feet supported, Bilateral upper extremity supported  Static Sitting-Level of Assistance: Contact guard (SBA/CGA)  Dynamic Sitting Balance  Dynamic Sitting-Balance Support: Feet unsupported, Bilateral upper extremity supported  Dynamic Sitting-Level of Assistance: Minimum assistance, Moderate assistance  Dynamic Sitting-Balance:  (scoot to EOB)    Static Standing Balance  Static Standing-Balance Support: Bilateral upper extremity supported (with WW)  Static Standing-Level of Assistance: Minimum assistance, Moderate assistance  Dynamic Standing Balance  Dynamic Standing-Balance Support: Bilateral upper extremity supported (on WW)  Dynamic Standing-Level of Assistance: Minimum assistance, Moderate assistance  Dynamic Standing-Balance:  (gait, transfers)  Functional Assessments:  ADL  ADL's Addressed: No         Transfers  Transfer: Yes  Transfer 1  Transfer From 1: Sit to, Stand to  Transfer to 1: Sit, Stand  Technique 1: Sit to stand, Stand to sit  Transfer Device 1: Walker  Transfer Level of Assistance 1: Moderate assistance, Minimal verbal cues, Moderate tactile cues  Trials/Comments 1: bed ht raised, very bradykinetic, festinating  Transfers 2  Transfer From 2: Stand to  Transfer to 2: Chair with arms  Technique 2: Stand pivot, Stand to sit  Transfer Device 2: Walker  Transfer Level of Assistance 2: Moderate assistance, Minimum assistance, Minimal verbal cues, Moderate tactile cues  Trials/Comments 2: difficulty turning    Ambulation/Gait Training  Ambulation/Gait Training Performed: Yes  Ambulation/Gait Training 1  Surface 1: Level tile  Device 1: Rolling walker  Assistance 1: Minimum assistance, Moderate assistance, Moderate verbal cues, Moderate tactile cues  Quality of Gait 1:  (flexed upper trunk, retro lean, small slow festinating gait, assist with walker (abram with turn to transfer))  Comments/Distance (ft) 1: 4 to get to chair (pt too fatigue to  walk further today)    Stairs  Stairs: No (pt too tired to do today)  Extremity/Trunk Assessments:  RUE   RUE :  (AAROM grossly WFL (grasp, elbow flex/ext, shoulder elevation); strength grossly: grasp 4, elbow flex 4, elbow ext 4+, shoulder elevation 4)  LUE   LUE:  (AAROM grossly WFL (grasp, elbow flex/ext, shoulder elevation); strength grossly: grasp 4, elbow flex 4, elbow ext 4+, shoulder elevation 4)  RLE   RLE :  (AROM grossly: ankle DF to neutral with knee flexed, ankle PF WFL, knee flex/ext WFL, hip flex WFL; strength grossly: ankle DF 4, knee flexion >3, knee ext >3, SLR indep (didn't resist due to recent hip pain))  LLE   LLE :  (AROM grossly: ankle DF to neutral with knee flexed, ankle PF WFL, knee flex/ext WFL, hip flex WFL; strength grossly: ankle DF 4+, knee flexion >4-, knee ext >4, SLR indep)  Outcome Measures:  [facility] Basic Mobility  Turning from your back to your side while in a flat bed without using bedrails: A little  Moving from lying on your back to sitting on the side of a flat bed without using bedrails: A lot  Moving to and from bed to chair (including a wheelchair): A lot  Standing up from a chair using your arms (e.g. wheelchair or bedside chair): A lot  To walk in hospital room: A lot  Climbing 3-5 steps with railing: Total  Basic Mobility - Total Score: 12    Encounter Problems       Encounter Problems (Active)       General Goals       Pt will be supervision with supine/sitting LE HEP (Not Progressing)       Start:  04/02/25    Expected End:  04/16/25            Pt will come supine to/from sit (HOB elevated, use of rail) modified independent (Progressing)       Start:  04/02/25    Expected End:  04/16/25            Pt will come sit to/from stand and bed to/from chair with WW and supervision, no LOB (Progressing)       Start:  04/02/25    Expected End:  04/16/25               Mobility       Pt will ambulate 150' with WW at gait speed > 0.8 meters/second with supervision, no LOB  (Progressing)       Start:  04/02/25    Expected End:  04/16/25            Pt will ascend/descend 4 + 13 stairs with 1 rail, 1 step at a time leading with Left le with supervision, no LOB (Not Progressing)       Start:  04/02/25    Expected End:  04/16/25            Pt will complete the 5 Times Sit to Stand with supervision in >16.7 seconds (Not Progressing)       Start:  04/02/25    Expected End:  04/16/25               Pain - Adult              Education Documentation  Precautions, taught by Micaela Auguste PT at 4/2/2025 12:32 PM.  Learner: Patient  Readiness: Acceptance  Method: Explanation, Demonstration  Response: Needs Reinforcement, Verbalizes Understanding  Comment: PT purpose/POC/DC rec and rationale, safe transfers/stepping with walker, vitals, need for assist with all in-hospital mobility    Body Mechanics, taught by Micaela Auguste PT at 4/2/2025 12:32 PM.  Learner: Patient  Readiness: Acceptance  Method: Explanation, Demonstration  Response: Needs Reinforcement, Verbalizes Understanding  Comment: PT purpose/POC/DC rec and rationale, safe transfers/stepping with walker, vitals, need for assist with all in-hospital mobility    Mobility Training, taught by Micaela Auguste PT at 4/2/2025 12:32 PM.  Learner: Patient  Readiness: Acceptance  Method: Explanation, Demonstration  Response: Needs Reinforcement, Verbalizes Understanding  Comment: PT purpose/POC/DC rec and rationale, safe transfers/stepping with walker, vitals, need for assist with all in-hospital mobility    Education Comments  No comments found.

## 2025-04-02 NOTE — PROGRESS NOTES
"   Medical Group Progress Note  ASSESSMENT & PLAN:   Yaniv Bearden is a 79 y.o. male admitted to [facility] for management of:    Weakness   Gait instability  Pagets  OA  -will order pt/ot eval and treat  -SW for placement  -Pain: Tylenols scheduled  -voltaren gel/lidocaine to right leg     HTN  HLD  - c/w home lasix 40mg daily  -c/w home simvastatin 40mg daily     Parkinsons  -c/w home Carbidopa/Levodopa  tid     Glaucoma  -c/w home Cosopt both eyes bid  -c/w home latanoprost both eyes at bedtime     Erectile dysfunction  -hold home viagra    VTE Prophylaxis: Lovenox 40 mg    Disposition: Medically ready for discharge, will need SNF placement.    Earl Hendrix, DO  Hospital Medicine    SUBJECTIVE     Admits to having hip pain rated 7/10 that comes and goes. Currently feels comfortable. Did not have further concerns.     OBJECTIVE:     Last Recorded Vitals:  Vitals:    04/02/25 0020 04/02/25 0504 04/02/25 1153 04/02/25 1212   BP: 155/89 146/78 137/73 148/83   BP Location:   Left arm    Patient Position:    Lying   Pulse:  53 64 65   Resp: 18 18  18   Temp:    36.6 °C (97.9 °F)   TempSrc:       SpO2:  98% 94% 97%   Weight: 74.1 kg (163 lb 5.8 oz)      Height: 1.676 m (5' 5.98\")        Last I/O:  No intake/output data recorded.    Physical Exam  Vitals and nursing note reviewed.   Constitutional:       General: He is not in acute distress.     Appearance: Normal appearance.   HENT:      Head: Normocephalic and atraumatic.      Mouth/Throat:      Mouth: Mucous membranes are moist.   Eyes:      Extraocular Movements: Extraocular movements intact.      Conjunctiva/sclera: Conjunctivae normal.   Cardiovascular:      Rate and Rhythm: Regular rhythm.   Pulmonary:      Effort: Pulmonary effort is normal.   Abdominal:      General: Abdomen is flat. Bowel sounds are normal.      Palpations: Abdomen is soft.   Musculoskeletal:         General: Tenderness present.      Right lower leg: Edema present.      Left lower leg: " Edema present.   Skin:     General: Skin is warm.   Neurological:      Mental Status: He is alert and oriented to person, place, and time.      Motor: Weakness present.      Gait: Gait abnormal.   Psychiatric:         Mood and Affect: Mood normal.         Behavior: Behavior normal.         Thought Content: Thought content normal.         Inpatient Medications:  acetaminophen, 975 mg, oral, q8h  carbidopa-levodopa, 1 tablet, oral, TID  diclofenac sodium, 4 g, Topical, 4x daily  dorzolamide-timoloL, 1 drop, Both Eyes, BID  enoxaparin, 40 mg, subcutaneous, q24h  fluticasone, 1 spray, Each Nostril, Daily  furosemide, 40 mg, oral, Daily  latanoprost, 1 drop, Both Eyes, Nightly  lidocaine, 1 patch, transdermal, Daily  sennosides-docusate sodium, 2 tablet, oral, BID  simvastatin, 40 mg, oral, Nightly    PRN Medications  PRN medications: oxyCODONE  Continuous Medications:     LABS AND IMAGING:     Labs:  Results from last 7 days   Lab Units 04/02/25  0739 04/01/25  1339   WBC AUTO x10*3/uL 6.3 7.5   RBC AUTO x10*6/uL 4.25* 4.33*   HEMOGLOBIN g/dL 12.9* 13.3*   HEMATOCRIT % 38.7* 37.4*   MCV fL 91 86   MCH pg 30.4 30.7   MCHC g/dL 33.3 35.6   RDW % 12.8 12.0   PLATELETS AUTO x10*3/uL 161 181     Results from last 7 days   Lab Units 04/02/25  0739 04/01/25  1339   SODIUM mmol/L 143 140   POTASSIUM mmol/L 3.0* 3.6   CHLORIDE mmol/L 105 101   CO2 mmol/L 30 31   BUN mg/dL 12 13   CREATININE mg/dL 0.95 1.10   GLUCOSE mg/dL 82 101*   PROTEIN TOTAL g/dL  --  7.4   CALCIUM mg/dL 9.7 10.3   BILIRUBIN TOTAL mg/dL  --  1.3*   ALK PHOS U/L  --  55   AST U/L  --  23   ALT U/L  --  3*             Imaging:  XR hip right with pelvis when performed 4+ views, XR femur right 2+ views, XR knee right 4+ views  Narrative: Interpreted By:  Andrew Lew,   STUDY:  XR HIP RIGHT WITH PELVIS WHEN PERFORMED 4+ VIEWS; XR FEMUR RIGHT 2+  VIEWS; XR KNEE RIGHT 4+ VIEWS; ;  4/1/2025 2:11 pm      INDICATION:  Signs/Symptoms:R hip pain; Signs/Symptoms:R  leg pain;  Signs/Symptoms:R knee pain.          COMPARISON:  03/22/2025.      ACCESSION NUMBER(S):  [id number]; [id number]; [id number]      ORDERING CLINICIAN:  CHINMAY CARROLL      FINDINGS:  Right hip, three views, right femur, two views, right femur, four  views.      There is no acute fracture or dislocation seen in the visualized  right lower extremity. Mild degenerative changes with osteophytosis  in the knee joint and the hip joint. There is no knee effusion.  Cortical thickening and trabecular thickening in the right proximal  femur compatible with Paget's disease in similar to prior imaging.      Impression: No acute abnormality.  Paget's disease in the proximal femur. Mild osteoarthritis          MACRO:  None      Signed by: Andrew Lew 4/1/2025 2:36 PM  Dictation workstation:   [id number]

## 2025-04-02 NOTE — CARE PLAN
The patient's goals for the shift include      The clinical goals for the shift include   Problem: Pain - Adult  Goal: Verbalizes/displays adequate comfort level or baseline comfort level  Outcome: Progressing     Problem: Safety - Adult  Goal: Free from fall injury  Outcome: Progressing     Problem: Discharge Planning  Goal: Discharge to home or other facility with appropriate resources  Outcome: Progressing     Problem: Chronic Conditions and Co-morbidities  Goal: Patient's chronic conditions and co-morbidity symptoms are monitored and maintained or improved  Outcome: Progressing     Problem: Nutrition  Goal: Nutrient intake appropriate for maintaining nutritional needs  Outcome: Progressing     Problem: Fall/Injury  Goal: Not fall by end of shift  Outcome: Progressing  Goal: Be free from injury by end of the shift  Outcome: Progressing  Goal: Verbalize understanding of personal risk factors for fall in the hospital  Outcome: Progressing  Goal: Verbalize understanding of risk factor reduction measures to prevent injury from fall in the home  Outcome: Progressing  Goal: Use assistive devices by end of the shift  Outcome: Progressing  Goal: Pace activities to prevent fatigue by end of the shift  Outcome: Progressing

## 2025-04-03 ENCOUNTER — APPOINTMENT (OUTPATIENT)
Dept: HOME HEALTH SERVICES | Facility: HOME HEALTH | Age: 80
End: 2025-04-03
Payer: MEDICARE

## 2025-04-03 ENCOUNTER — HOME CARE VISIT (OUTPATIENT)
Dept: HOME HEALTH SERVICES | Facility: HOME HEALTH | Age: 80
End: 2025-04-03
Payer: MEDICARE

## 2025-04-03 LAB
ALBUMIN SERPL BCP-MCNC: 3.7 G/DL (ref 3.4–5)
ANION GAP SERPL CALC-SCNC: 12 MMOL/L (ref 10–20)
BUN SERPL-MCNC: 15 MG/DL (ref 6–23)
CALCIUM SERPL-MCNC: 9.1 MG/DL (ref 8.6–10.6)
CHLORIDE SERPL-SCNC: 106 MMOL/L (ref 98–107)
CO2 SERPL-SCNC: 27 MMOL/L (ref 21–32)
CREAT SERPL-MCNC: 1.07 MG/DL (ref 0.5–1.3)
EGFRCR SERPLBLD CKD-EPI 2021: 70 ML/MIN/1.73M*2
ERYTHROCYTE [DISTWIDTH] IN BLOOD BY AUTOMATED COUNT: 13 % (ref 11.5–14.5)
GLUCOSE SERPL-MCNC: 87 MG/DL (ref 74–99)
HCT VFR BLD AUTO: 37 % (ref 41–52)
HGB BLD-MCNC: 12.3 G/DL (ref 13.5–17.5)
MAGNESIUM SERPL-MCNC: 2.13 MG/DL (ref 1.6–2.4)
MCH RBC QN AUTO: 30.2 PG (ref 26–34)
MCHC RBC AUTO-ENTMCNC: 33.2 G/DL (ref 32–36)
MCV RBC AUTO: 91 FL (ref 80–100)
NRBC BLD-RTO: 0 /100 WBCS (ref 0–0)
PHOSPHATE SERPL-MCNC: 2.8 MG/DL (ref 2.5–4.9)
PLATELET # BLD AUTO: 158 X10*3/UL (ref 150–450)
POTASSIUM SERPL-SCNC: 3.3 MMOL/L (ref 3.5–5.3)
RBC # BLD AUTO: 4.07 X10*6/UL (ref 4.5–5.9)
SODIUM SERPL-SCNC: 142 MMOL/L (ref 136–145)
WBC # BLD AUTO: 6.3 X10*3/UL (ref 4.4–11.3)

## 2025-04-03 PROCEDURE — 80069 RENAL FUNCTION PANEL: CPT | Performed by: STUDENT IN AN ORGANIZED HEALTH CARE EDUCATION/TRAINING PROGRAM

## 2025-04-03 PROCEDURE — 85027 COMPLETE CBC AUTOMATED: CPT | Performed by: STUDENT IN AN ORGANIZED HEALTH CARE EDUCATION/TRAINING PROGRAM

## 2025-04-03 PROCEDURE — 36415 COLL VENOUS BLD VENIPUNCTURE: CPT | Performed by: STUDENT IN AN ORGANIZED HEALTH CARE EDUCATION/TRAINING PROGRAM

## 2025-04-03 PROCEDURE — 2500000001 HC RX 250 WO HCPCS SELF ADMINISTERED DRUGS (ALT 637 FOR MEDICARE OP): Performed by: NURSE PRACTITIONER

## 2025-04-03 PROCEDURE — 96372 THER/PROPH/DIAG INJ SC/IM: CPT | Performed by: NURSE PRACTITIONER

## 2025-04-03 PROCEDURE — 99232 SBSQ HOSP IP/OBS MODERATE 35: CPT | Performed by: STUDENT IN AN ORGANIZED HEALTH CARE EDUCATION/TRAINING PROGRAM

## 2025-04-03 PROCEDURE — 97535 SELF CARE MNGMENT TRAINING: CPT | Mod: GO

## 2025-04-03 PROCEDURE — 2500000002 HC RX 250 W HCPCS SELF ADMINISTERED DRUGS (ALT 637 FOR MEDICARE OP, ALT 636 FOR OP/ED): Performed by: NURSE PRACTITIONER

## 2025-04-03 PROCEDURE — 97530 THERAPEUTIC ACTIVITIES: CPT | Mod: GO

## 2025-04-03 PROCEDURE — 83735 ASSAY OF MAGNESIUM: CPT | Performed by: STUDENT IN AN ORGANIZED HEALTH CARE EDUCATION/TRAINING PROGRAM

## 2025-04-03 PROCEDURE — 2500000004 HC RX 250 GENERAL PHARMACY W/ HCPCS (ALT 636 FOR OP/ED): Performed by: NURSE PRACTITIONER

## 2025-04-03 PROCEDURE — 2500000005 HC RX 250 GENERAL PHARMACY W/O HCPCS: Performed by: NURSE PRACTITIONER

## 2025-04-03 PROCEDURE — G0378 HOSPITAL OBSERVATION PER HR: HCPCS

## 2025-04-03 RX ORDER — SIMVASTATIN 40 MG/1
40 TABLET, FILM COATED ORAL NIGHTLY
Start: 2025-04-03 | End: 2025-05-03

## 2025-04-03 RX ORDER — DICLOFENAC SODIUM 10 MG/G
4 GEL TOPICAL 4 TIMES DAILY
Qty: 450 G | Refills: 0
Start: 2025-04-03

## 2025-04-03 RX ORDER — ACETAMINOPHEN 500 MG
1000 TABLET ORAL EVERY 8 HOURS
Start: 2025-04-03 | End: 2025-04-08

## 2025-04-03 RX ADMIN — LATANOPROST 1 DROP: 50 SOLUTION OPHTHALMIC at 22:31

## 2025-04-03 RX ADMIN — CARBIDOPA AND LEVODOPA 1 TABLET: 25; 100 TABLET ORAL at 14:52

## 2025-04-03 RX ADMIN — ACETAMINOPHEN 975 MG: 325 TABLET ORAL at 18:25

## 2025-04-03 RX ADMIN — DICLOFENAC SODIUM 4 G: 10 GEL TOPICAL at 06:44

## 2025-04-03 RX ADMIN — LIDOCAINE 4% 1 PATCH: 40 PATCH TOPICAL at 18:25

## 2025-04-03 RX ADMIN — ENOXAPARIN SODIUM 40 MG: 100 INJECTION SUBCUTANEOUS at 17:20

## 2025-04-03 RX ADMIN — FUROSEMIDE 40 MG: 40 TABLET ORAL at 09:00

## 2025-04-03 RX ADMIN — CARBIDOPA AND LEVODOPA 1 TABLET: 25; 100 TABLET ORAL at 09:00

## 2025-04-03 RX ADMIN — FLUTICASONE PROPIONATE 1 SPRAY: 50 SPRAY, METERED NASAL at 09:01

## 2025-04-03 RX ADMIN — DORZOLAMIDE HYDROCHLORIDE AND TIMOLOL MALEATE 1 DROP: 20; 5 SOLUTION OPHTHALMIC at 22:31

## 2025-04-03 RX ADMIN — DICLOFENAC SODIUM 4 G: 10 GEL TOPICAL at 17:21

## 2025-04-03 RX ADMIN — ACETAMINOPHEN 975 MG: 325 TABLET ORAL at 11:12

## 2025-04-03 RX ADMIN — DICLOFENAC SODIUM 4 G: 10 GEL TOPICAL at 12:40

## 2025-04-03 RX ADMIN — SIMVASTATIN 40 MG: 20 TABLET, FILM COATED ORAL at 22:31

## 2025-04-03 RX ADMIN — CARBIDOPA AND LEVODOPA 1 TABLET: 25; 100 TABLET ORAL at 22:31

## 2025-04-03 RX ADMIN — DORZOLAMIDE HYDROCHLORIDE AND TIMOLOL MALEATE 1 DROP: 20; 5 SOLUTION OPHTHALMIC at 09:02

## 2025-04-03 ASSESSMENT — COGNITIVE AND FUNCTIONAL STATUS - GENERAL
EATING MEALS: A LITTLE
HELP NEEDED FOR BATHING: A LOT
MOBILITY SCORE: 14
PERSONAL GROOMING: A LITTLE
DRESSING REGULAR UPPER BODY CLOTHING: A LITTLE
DAILY ACTIVITIY SCORE: 16
TURNING FROM BACK TO SIDE WHILE IN FLAT BAD: A LITTLE
DRESSING REGULAR LOWER BODY CLOTHING: A LITTLE
TOILETING: A LOT
HELP NEEDED FOR BATHING: A LITTLE
PERSONAL GROOMING: A LITTLE
TOILETING: A LITTLE
DRESSING REGULAR LOWER BODY CLOTHING: A LOT
WALKING IN HOSPITAL ROOM: A LOT
MOVING TO AND FROM BED TO CHAIR: A LOT
DRESSING REGULAR UPPER BODY CLOTHING: A LITTLE
STANDING UP FROM CHAIR USING ARMS: A LOT
CLIMB 3 TO 5 STEPS WITH RAILING: A LOT
MOVING FROM LYING ON BACK TO SITTING ON SIDE OF FLAT BED WITH BEDRAILS: A LITTLE
DAILY ACTIVITIY SCORE: 18

## 2025-04-03 ASSESSMENT — PAIN SCALES - GENERAL
PAINLEVEL_OUTOF10: 3
PAINLEVEL_OUTOF10: 0 - NO PAIN
PAINLEVEL_OUTOF10: 6

## 2025-04-03 ASSESSMENT — PAIN - FUNCTIONAL ASSESSMENT
PAIN_FUNCTIONAL_ASSESSMENT: 0-10

## 2025-04-03 ASSESSMENT — ACTIVITIES OF DAILY LIVING (ADL): HOME_MANAGEMENT_TIME_ENTRY: 10

## 2025-04-03 NOTE — CARE PLAN
The patient's goals for the shift include      The clinical goals for the shift include Patient will remain safe and free from falls during shift    Over the shift, the patient did not make progress toward the following goals. Barriers to progression include acute disease process.  Recommendations to address these barriers include adherence to treatment plan

## 2025-04-03 NOTE — PROGRESS NOTES
Yaniv Bearden is a 79 y.o. male on day 0 of admission presenting with Paget's disease of right femur.    Pt medically ready. TCC attempted to follow up with pt for SNF preferences but per pt he has not selected any SNFs as yet. TCC made pt aware will follow up with his son, Francisco.  1312-TCC spoke to pt's son, Francisco that stated SNF FOC in order of preference is 1) [facility] 2) [facility] [place] 3) [facility]. Per son ok with TCC sending out additional SNF referrals in the areas of [facility], [facility], and [place]. Will continue to follow.   1424-TCC spoke to pt's son, Francisco and made aware [facility] SN[facility] and [facility] are OON but [facility] [place] is able to accept-Francisco agreeable with auth being started at [facility] [place] SN[facility] requested direct pre-cert submit team to initiate auth. Attending updated.   1434-Per direct pre-cert submit team auth approved: 4/3-4/7; auth#[id number]. Attending and pt's RN updated.   1453-[facility] [place] requesting transport to be set up tomorrow as they already have a lot of admissions for today. [facility] updated attending.

## 2025-04-03 NOTE — PROGRESS NOTES
Medical Group Progress Note  ASSESSMENT & PLAN:   Yaniv Bearden is a 79 y.o. male admitted to [facility] for management of:    Weakness   Gait instability  Pagets  OA  -PT/OT recommend moderate intensity therapy, will benefit from SNF.  - for placement  -Pain: Tylenols scheduled  -voltaren gel/lidocaine to right leg     HTN  HLD  - c/w home lasix 40mg daily  -c/w home simvastatin 40mg daily     Parkinsons  -c/w home Carbidopa/Levodopa  tid     Glaucoma  -c/w home Cosopt both eyes bid  -c/w home latanoprost both eyes at bedtime     Erectile dysfunction  -hold home viagra    VTE Prophylaxis: Lovenox 40 mg    Disposition: Anticipate discharge to SNF tomorrow.    Earl Hendrix, [facility] Medicine    SUBJECTIVE     States to feel better today in terms of hip pain and also reports tolerating physical therapy exercises today.    OBJECTIVE:     Last Recorded Vitals:  Vitals:    04/02/25 2050 04/03/25 0527 04/03/25 0731 04/03/25 1230   BP: 118/62 127/68 125/66 109/61   BP Location:  Left arm Right arm Left arm   Patient Position:  Lying Lying Lying   Pulse: 67 57 57 54   Resp: 17 16 16 16   Temp: 37 °C (98.6 °F) 36.9 °C (98.4 °F) 37.1 °C (98.8 °F) 36.7 °C (98.1 °F)   TempSrc:  Temporal Temporal Temporal   SpO2: 97% 97% 96% 98%   Weight:       Height:         Last I/O:  I/O last 3 completed shifts:  In: 240 (3.2 mL/kg) [P.O.:240]  Out: 875 (11.8 mL/kg) [Urine:875 (0.3 mL/kg/hr)]  Weight: 74.1 kg     Physical Exam  Vitals and nursing note reviewed.   Constitutional:       General: He is not in acute distress.     Appearance: Normal appearance.   HENT:      Head: Normocephalic and atraumatic.      Mouth/Throat:      Mouth: Mucous membranes are moist.   Eyes:      Extraocular Movements: Extraocular movements intact.      Conjunctiva/sclera: Conjunctivae normal.   Cardiovascular:      Rate and Rhythm: Regular rhythm.   Pulmonary:      Effort: Pulmonary effort is normal.   Abdominal:      General: Abdomen is flat.  Bowel sounds are normal.      Palpations: Abdomen is soft.   Musculoskeletal:         General: Tenderness present.      Right lower leg: Edema present.      Left lower leg: Edema present.   Skin:     General: Skin is warm.   Neurological:      Mental Status: He is alert and oriented to person, place, and time.      Motor: Weakness present.      Gait: Gait abnormal.   Psychiatric:         Mood and Affect: Mood normal.         Behavior: Behavior normal.         Thought Content: Thought content normal.         Inpatient Medications:  acetaminophen, 975 mg, oral, q8h  carbidopa-levodopa, 1 tablet, oral, TID  diclofenac sodium, 4 g, Topical, 4x daily  dorzolamide-timoloL, 1 drop, Both Eyes, BID  enoxaparin, 40 mg, subcutaneous, q24h  fluticasone, 1 spray, Each Nostril, Daily  furosemide, 40 mg, oral, Daily  latanoprost, 1 drop, Both Eyes, Nightly  lidocaine, 1 patch, transdermal, Daily  sennosides-docusate sodium, 2 tablet, oral, BID  simvastatin, 40 mg, oral, Nightly    PRN Medications  PRN medications: oxyCODONE  Continuous Medications:     LABS AND IMAGING:     Labs:  Results from last 7 days   Lab Units 04/03/25  0652 04/02/25  0739 04/01/25  1339   WBC AUTO x10*3/uL 6.3 6.3 7.5   RBC AUTO x10*6/uL 4.07* 4.25* 4.33*   HEMOGLOBIN g/dL 12.3* 12.9* 13.3*   HEMATOCRIT % 37.0* 38.7* 37.4*   MCV fL 91 91 86   MCH pg 30.2 30.4 30.7   MCHC g/dL 33.2 33.3 35.6   RDW % 13.0 12.8 12.0   PLATELETS AUTO x10*3/uL 158 161 181     Results from last 7 days   Lab Units 04/03/25  0652 04/02/25  0739 04/01/25  1339   SODIUM mmol/L 142 143 140   POTASSIUM mmol/L 3.3* 3.0* 3.6   CHLORIDE mmol/L 106 105 101   CO2 mmol/L 27 30 31   BUN mg/dL 15 12 13   CREATININE mg/dL 1.07 0.95 1.10   GLUCOSE mg/dL 87 82 101*   PROTEIN TOTAL g/dL  --   --  7.4   CALCIUM mg/dL 9.1 9.7 10.3   BILIRUBIN TOTAL mg/dL  --   --  1.3*   ALK PHOS U/L  --   --  55   AST U/L  --   --  23   ALT U/L  --   --  3*     Results from last 7 days   Lab Units 04/03/25  0652    MAGNESIUM mg/dL 2.13         Imaging:  XR hip right with pelvis when performed 4+ views, XR femur right 2+ views, XR knee right 4+ views  Narrative: Interpreted By:  Andrew Lew,   STUDY:  XR HIP RIGHT WITH PELVIS WHEN PERFORMED 4+ VIEWS; XR FEMUR RIGHT 2+  VIEWS; XR KNEE RIGHT 4+ VIEWS; ;  4/1/2025 2:11 pm      INDICATION:  Signs/Symptoms:R hip pain; Signs/Symptoms:R leg pain;  Signs/Symptoms:R knee pain.          COMPARISON:  03/22/2025.      ACCESSION NUMBER(S):  [id number]; [id number]; [id number]      ORDERING CLINICIAN:  CHINMAY CARROLL      FINDINGS:  Right hip, three views, right femur, two views, right femur, four  views.      There is no acute fracture or dislocation seen in the visualized  right lower extremity. Mild degenerative changes with osteophytosis  in the knee joint and the hip joint. There is no knee effusion.  Cortical thickening and trabecular thickening in the right proximal  femur compatible with Paget's disease in similar to prior imaging.      Impression: No acute abnormality.  Paget's disease in the proximal femur. Mild osteoarthritis          MACRO:  None      Signed by: Andrew Lew 4/1/2025 2:36 PM  Dictation workstation:   [id number]

## 2025-04-03 NOTE — PROGRESS NOTES
04/03/25 1535   Discharge Planning   Expected Discharge Disposition SNF  ([facility])   What day is the transport expected? 04/04/25   What time is the transport expected? 1100     Transport confirmed via [facility] ([phone]); N2N # [phone]. Attending, pt's RN, resource RN, pt, pt's sonFrancisco, and pt's caregiver, Yoselyn aware. DC paperwork sent to [facility] and [facility] aware PASSR needed for admission.

## 2025-04-03 NOTE — NURSING NOTE
Geriatric Nursing rounds summary  Mr Bearden was admitted weakness, h/o Paget's Parkinson's HTN glaucoma  Age friendly  What matters less pain which seems helped by oxycodone  Mentation cam neg  Mobility has been up has been working with therapy  Meds tylenal voltaren, sinemett eye drops lovenox  Recommendations cont encourage safe mobility monitor for constipation on oxycodone encourage tylenal gels

## 2025-04-03 NOTE — PROGRESS NOTES
Occupational Therapy    Occupational Therapy Treatment    Name: Yaniv Bearden  MRN: [id number]  : 1945  Date: 25  Time Calculation  Start Time: 1042  Stop Time: 1106  Time Calculation (min): 24 min    Assessment:  Prognosis: Good  Barriers to Discharge Home: Caregiver assistance, Physical needs  Caregiver Assistance: Caregiver assistance needed per identified barriers - however, level of patient's required assistance exceeds assistance available at home  Physical Needs: Intermittent mobility assistance needed, Intermittent ADL assistance needed  Evaluation/Treatment Tolerance: Patient tolerated treatment well  Medical Staff Made Aware: Yes  End of Session Communication: Bedside nurse  End of Session Patient Position: Up in chair, Alarm on  Plan:  Treatment Interventions: ADL retraining, Visual perceptual retraining, Functional transfer training, UE strengthening/ROM, Endurance training, Cognitive reorientation, Patient/family training, Equipment evaluation/education, Neuromuscular reeducation, Fine motor coordination activities, Compensatory technique education, UE splinting, Continued evaluation  OT Frequency: 2 times per week  OT Discharge Recommendations: Moderate intensity level of continued care  Equipment Recommended upon Discharge: Other (comment) (wheeled walker)  OT Recommended Transfer Status: Assist of 1  OT - OK to Discharge: Yes (when medically appropriate)    Subjective   General:  OT Last Visit  OT Received On: 25  Reason for Referral: 78 y/o M admitted  with worsening Right hip/leg pain, difficulty ambulating x 1 month after fall;  Past Medical History Relevant to Rehab: Paget's bone disease, BPH  HTN, Parkinson's, HLD, inguinal hernia, chronic sinusitis, lumbar spondylosis/hip OA, glaucoma, erectile dysfunction  Prior to Session Communication: Bedside nurse  Patient Position Received: Bed, 3 rail up, Alarm on  Family/Caregiver Present: No  General Comment: Supine with HOB  elevated at the start of the session.       Cognition:  Overall Cognitive Status: Within Functional Limits  Arousal/Alertness: Appropriate responses to stimuli  Orientation Level: Oriented X4    Pain Assessment:  Pain Assessment  Pain Assessment: 0-10  0-10 (Numeric) Pain Score: 0 - No pain     Objective   Activities of Daily Living:     LE Dressing  LE Dressing: Yes  Shoe Level of Assistance: Maximum assistance, Minimal verbal cues  LE Dressing Where Assessed: Edge of bed    Bed Mobility/Transfers:   Bed Mobility  Bed Mobility: Yes  Bed Mobility 1  Bed Mobility 1: Sitting to supine  Level of Assistance 1: Contact guard, Minimal verbal cues    Transfers  Transfer: Yes  Transfer 1  Transfer From 1: Bed to  Transfer to 1: Stand  Technique 1: Sit to stand  Transfer Device 1: Walker  Transfer Level of Assistance 1: Minimum assistance, Minimal verbal cues  Transfers 2  Transfer From 2: Stand to  Transfer to 2: Chair with arms  Technique 2: Stand to sit  Transfer Device 2: Walker  Transfer Level of Assistance 2: Contact guard, Minimal verbal cues    Balance:  Dynamic Standing Balance  Dynamic Standing-Balance Support: Bilateral upper extremity supported  Dynamic Standing-Level of Assistance: Minimum assistance  Dynamic Standing-Balance:  (Pt required min assist with short distance ambulation with fair dynamic standing balance.)      Outcome Measures:  [facility] Daily Activity  Putting on and taking off regular lower body clothing: A lot  Bathing (including washing, rinsing, drying): A lot  Putting on and taking off regular upper body clothing: A little  Toileting, which includes using toilet, bedpan or urinal: A lot  Taking care of personal grooming such as brushing teeth: A little  Eating Meals: None  Daily Activity - Total Score: 16     Education Documentation  Body Mechanics, taught by Allison Page OT at 4/3/2025  3:57 PM.  Learner: Patient  Readiness: Acceptance  Method: Explanation  Response: Verbalizes  Understanding    Precautions, taught by Allison Page OT at 4/3/2025  3:57 PM.  Learner: Patient  Readiness: Acceptance  Method: Explanation  Response: Verbalizes Understanding    ADL Training, taught by Allison Page OT at 4/3/2025  3:57 PM.  Learner: Patient  Readiness: Acceptance  Method: Explanation  Response: Verbalizes Understanding    Education Comments  No comments found.        Goals:  Encounter Problems       Encounter Problems (Active)       ADLs       Patient will complete LB dressing with CGA assist and min cues.   (Progressing)       Start:  04/02/25    Expected End:  04/16/25            Patient will complete toileting with SBA assist and min cues.   (Progressing)       Start:  04/02/25    Expected End:  04/16/25            Patient will complete grooming with MOD I.   (Progressing)       Start:  04/02/25    Expected End:  04/16/25               BALANCE       Patient will demo standing balance with SBA for at least 5 min in prep for standing ADLs.  (Progressing)       Start:  04/02/25    Expected End:  04/16/25               MOBILITY       Patient will complete bed mobility with MOD I.    (Progressing)       Start:  04/02/25    Expected End:  04/16/25            Pt. Will demo household distance functional mobility with SBA using LRAD.   (Progressing)       Start:  04/02/25    Expected End:  04/16/25                   TRANSFERS       Pt. Will complete stand pivot transfer with SBA assist with min cues and using LRAD.   (Progressing)       Start:  04/02/25    Expected End:  04/16/25 04/03/25 at 3:58 PM   Allison Page OTR/L, OTD  [phone]

## 2025-04-03 NOTE — CARE PLAN
The patient's goals for the shift include      The clinical goals for the shift include Patient will remain safe and free from falls during shift    Over the shift, the patient did not make progress toward the following goals. Barriers to progression include   . Recommendations to address these barriers include   .

## 2025-04-04 ENCOUNTER — HOME CARE VISIT (OUTPATIENT)
Dept: HOME HEALTH SERVICES | Facility: HOME HEALTH | Age: 80
End: 2025-04-04
Payer: MEDICARE

## 2025-04-04 VITALS
HEIGHT: 66 IN | SYSTOLIC BLOOD PRESSURE: 145 MMHG | TEMPERATURE: 98.2 F | HEART RATE: 89 BPM | DIASTOLIC BLOOD PRESSURE: 79 MMHG | OXYGEN SATURATION: 98 % | BODY MASS INDEX: 26.25 KG/M2 | WEIGHT: 163.36 LBS | RESPIRATION RATE: 18 BRPM

## 2025-04-04 LAB
ALBUMIN SERPL BCP-MCNC: 3.5 G/DL (ref 3.4–5)
ANION GAP SERPL CALC-SCNC: 12 MMOL/L (ref 10–20)
BUN SERPL-MCNC: 14 MG/DL (ref 6–23)
CALCIUM SERPL-MCNC: 9.2 MG/DL (ref 8.6–10.6)
CHLORIDE SERPL-SCNC: 104 MMOL/L (ref 98–107)
CO2 SERPL-SCNC: 29 MMOL/L (ref 21–32)
CREAT SERPL-MCNC: 0.99 MG/DL (ref 0.5–1.3)
EGFRCR SERPLBLD CKD-EPI 2021: 77 ML/MIN/1.73M*2
ERYTHROCYTE [DISTWIDTH] IN BLOOD BY AUTOMATED COUNT: 12.7 % (ref 11.5–14.5)
GLUCOSE SERPL-MCNC: 87 MG/DL (ref 74–99)
HCT VFR BLD AUTO: 36.7 % (ref 41–52)
HGB BLD-MCNC: 12.3 G/DL (ref 13.5–17.5)
MAGNESIUM SERPL-MCNC: 2.14 MG/DL (ref 1.6–2.4)
MCH RBC QN AUTO: 30.4 PG (ref 26–34)
MCHC RBC AUTO-ENTMCNC: 33.5 G/DL (ref 32–36)
MCV RBC AUTO: 91 FL (ref 80–100)
NRBC BLD-RTO: 0 /100 WBCS (ref 0–0)
PHOSPHATE SERPL-MCNC: 3.3 MG/DL (ref 2.5–4.9)
PLATELET # BLD AUTO: 164 X10*3/UL (ref 150–450)
POTASSIUM SERPL-SCNC: 3.3 MMOL/L (ref 3.5–5.3)
RBC # BLD AUTO: 4.05 X10*6/UL (ref 4.5–5.9)
SODIUM SERPL-SCNC: 142 MMOL/L (ref 136–145)
WBC # BLD AUTO: 5.7 X10*3/UL (ref 4.4–11.3)

## 2025-04-04 PROCEDURE — 97110 THERAPEUTIC EXERCISES: CPT | Mod: GP,CQ

## 2025-04-04 PROCEDURE — 97530 THERAPEUTIC ACTIVITIES: CPT | Mod: GP,CQ

## 2025-04-04 PROCEDURE — G0378 HOSPITAL OBSERVATION PER HR: HCPCS

## 2025-04-04 PROCEDURE — 2500000001 HC RX 250 WO HCPCS SELF ADMINISTERED DRUGS (ALT 637 FOR MEDICARE OP): Performed by: NURSE PRACTITIONER

## 2025-04-04 PROCEDURE — 83735 ASSAY OF MAGNESIUM: CPT | Performed by: STUDENT IN AN ORGANIZED HEALTH CARE EDUCATION/TRAINING PROGRAM

## 2025-04-04 PROCEDURE — 85027 COMPLETE CBC AUTOMATED: CPT | Performed by: STUDENT IN AN ORGANIZED HEALTH CARE EDUCATION/TRAINING PROGRAM

## 2025-04-04 PROCEDURE — 80069 RENAL FUNCTION PANEL: CPT | Performed by: STUDENT IN AN ORGANIZED HEALTH CARE EDUCATION/TRAINING PROGRAM

## 2025-04-04 PROCEDURE — 99239 HOSP IP/OBS DSCHRG MGMT >30: CPT | Performed by: STUDENT IN AN ORGANIZED HEALTH CARE EDUCATION/TRAINING PROGRAM

## 2025-04-04 PROCEDURE — 36415 COLL VENOUS BLD VENIPUNCTURE: CPT | Performed by: STUDENT IN AN ORGANIZED HEALTH CARE EDUCATION/TRAINING PROGRAM

## 2025-04-04 RX ADMIN — CARBIDOPA AND LEVODOPA 1 TABLET: 25; 100 TABLET ORAL at 09:22

## 2025-04-04 RX ADMIN — DORZOLAMIDE HYDROCHLORIDE AND TIMOLOL MALEATE 1 DROP: 20; 5 SOLUTION OPHTHALMIC at 09:22

## 2025-04-04 RX ADMIN — FUROSEMIDE 40 MG: 40 TABLET ORAL at 09:22

## 2025-04-04 RX ADMIN — SENNOSIDES AND DOCUSATE SODIUM 2 TABLET: 50; 8.6 TABLET ORAL at 09:22

## 2025-04-04 RX ADMIN — FLUTICASONE PROPIONATE 1 SPRAY: 50 SPRAY, METERED NASAL at 09:22

## 2025-04-04 ASSESSMENT — COGNITIVE AND FUNCTIONAL STATUS - GENERAL
MOBILITY SCORE: 11
EATING MEALS: A LITTLE
WALKING IN HOSPITAL ROOM: A LOT
CLIMB 3 TO 5 STEPS WITH RAILING: A LOT
CLIMB 3 TO 5 STEPS WITH RAILING: TOTAL
MOVING TO AND FROM BED TO CHAIR: A LOT
DRESSING REGULAR LOWER BODY CLOTHING: A LITTLE
TURNING FROM BACK TO SIDE WHILE IN FLAT BAD: A LITTLE
TOILETING: A LITTLE
MOVING TO AND FROM BED TO CHAIR: A LOT
MOVING FROM LYING ON BACK TO SITTING ON SIDE OF FLAT BED WITH BEDRAILS: A LOT
MOVING FROM LYING ON BACK TO SITTING ON SIDE OF FLAT BED WITH BEDRAILS: A LITTLE
WALKING IN HOSPITAL ROOM: A LOT
DRESSING REGULAR UPPER BODY CLOTHING: A LITTLE
STANDING UP FROM CHAIR USING ARMS: A LOT
STANDING UP FROM CHAIR USING ARMS: A LOT
PERSONAL GROOMING: A LITTLE
DAILY ACTIVITIY SCORE: 18
HELP NEEDED FOR BATHING: A LITTLE
TURNING FROM BACK TO SIDE WHILE IN FLAT BAD: A LOT
MOBILITY SCORE: 14

## 2025-04-04 ASSESSMENT — PAIN SCALES - GENERAL: PAINLEVEL_OUTOF10: 0 - NO PAIN

## 2025-04-04 ASSESSMENT — PAIN - FUNCTIONAL ASSESSMENT: PAIN_FUNCTIONAL_ASSESSMENT: 0-10

## 2025-04-04 NOTE — CARE PLAN
The patient's goals for the shift include      The clinical goals for the shift include Patient will remain safe and free from falls during shift    Over the shift, the patient did not make progress toward the following goals. Barriers to progression include acute disease process. Recommendations to address these barriers include adherence to treatment plan.

## 2025-04-04 NOTE — PROGRESS NOTES
Physical Therapy    Physical Therapy Treatment    Patient Name: Yaniv Bearden  MRN: [id number]  Department: [place] [address]  Room: 2025/2025-A  Today's Date: 4/4/2025  Time Calculation  Start Time: 0826  Stop Time: 0858  Time Calculation (min): 32 min         Assessment/Plan   PT Assessment  PT Assessment Results: Decreased strength, Decreased range of motion, Decreased endurance, Impaired balance, Decreased mobility, Decreased coordination  Rehab Prognosis: Fair  Barriers to Discharge Home: Physical needs  Physical Needs: Stair navigation into home limited by function/safety, In-home setup navigation limited by function/safety, Ambulating household distances limited by function/safety, Intermittent mobility assistance needed, Intermittent ADL assistance needed, High falls risk due to function or environment  Evaluation/Treatment Tolerance: Patient tolerated treatment well  End of Session Communication: Bedside nurse  Assessment Comment: Pt. tolerated session. Pt. required increased time for all activities due to parkinsons symptoms. Pt. should be leaving today to a facility for continued therapy. Unable to assess transfers and standing due to pt.'s unwillingness today.  End of Session Patient Position: Bed, 3 rail up, Alarm on  PT Plan  Inpatient/Swing Bed or Outpatient: Inpatient  PT Plan  Treatment/Interventions: Bed mobility, Therapeutic exercise, Therapeutic activity  PT Plan: Ongoing PT  PT Frequency: 3 times per week  PT Discharge Recommendations: Moderate intensity level of continued care  Equipment Recommended upon Discharge: Other (comment) (wheeled walker)  PT Recommended Transfer Status: Assist x1, Assistive device  PT - OK to Discharge: Yes (PT eval completed & DC recs made)      General Visit Information:   PT  Visit  PT Received On: 04/04/25  General  Reason for Referral: 78 y/o M admitted 4/1 with worsening Right hip/leg pain, difficulty ambulating x 1 month after fall;  Past Medical History Relevant to  Rehab: Paget's bone disease, BPH  HTN, Parkinson's, HLD, inguinal hernia, chronic sinusitis, lumbar spondylosis/hip OA, glaucoma, erectile dysfunction  Family/Caregiver Present: No  Prior to Session Communication: Bedside nurse  Patient Position Received: Bed, 3 rail up, Alarm on  General Comment: Pt. supine in bed upon arrival- keeping eyes closed most of session. Pt. with low volume and difficulty understanding at times. Pt. is  bradykinetic with speech and movement.    Subjective   Precautions:  Precautions  Hearing/Visual Limitations: appears WFL with glasses on.  Medical Precautions: Fall precautions     Date/Time Vitals Session Patient Position Pulse Resp SpO2 BP MAP (mmHg)    04/04/25 0920 --  --  89  18  98 %  145/79  101                 Objective   Pain:  Pain Assessment  Pain Assessment: 0-10  0-10 (Numeric) Pain Score:  (Pt. reports chronic pain in right le.)  Cognition:  Cognition  Safety Judgment: Decreased awareness of need for safety precautions  Planning: Reduced planning skills  Coordination:  Movements are Fluid and Coordinated: No (Bradykinetic with speech and movement)  Postural Control:  Postural Control  Postural Control: Impaired  Static Sitting Balance  Static Sitting-Balance Support: Feet supported, Bilateral upper extremity supported  Static Sitting-Level of Assistance:  (Distant supervision- Pt. insisted on sitting EOB to eat his breakfast becoming frustrated when this therapist attempted to encourage sitting up in bedside chair for support. Alerted CTA and Manager on floor d/t not being able to find the RN.)  Static Standing Balance  Static Standing-Balance Support:  (Pt. unwilling instead wanting to eat his breakfast that arrived during session.)  Extremity/Trunk Assessments:                      Activity Tolerance:  Activity Tolerance  Endurance: Tolerates 10 - 20 min exercise with multiple rests  Treatments:  Therapeutic Exercise  Therapeutic Exercise Performed: Yes  Therapeutic  "Exercise Activity 1: INcreased time required to perform supine bilat le ex AP'S, QS, SAQ'S, HS, AND ABD X 15 REPS.    Therapeutic Activity  Therapeutic Activity Performed: Yes  Therapeutic Activity 1: Increased time assisting pt. with breakfast - encouraging pt. to try and open and cut but also needing assist.    Bed Mobility  Bed Mobility: Yes  Bed Mobility 1  Bed Mobility 1: Supine to sitting  Level of Assistance 1:  (Mod assist as pt. struggling otherwise - Pt. requires heavy use of rails and assist with trunk)  Bed Mobility 2  Bed Mobility  2:  (Prior to supine to sit- Instructed pt. on a \"functional exercise\" scooting up in bed - INcreased time required despite HOB down and bed set in trendelenburg position. Pt. required mod assist.)    Outcome Measures:  [facility] Basic Mobility  Turning from your back to your side while in a flat bed without using bedrails: A lot  Moving from lying on your back to sitting on the side of a flat bed without using bedrails: A lot  Moving to and from bed to chair (including a wheelchair): A lot  Standing up from a chair using your arms (e.g. wheelchair or bedside chair): A lot  To walk in hospital room: A lot  Climbing 3-5 steps with railing: Total  Basic Mobility - Total Score: 11    Education Documentation  Body Mechanics, taught by Daily Gustafson PTA at 4/4/2025  9:28 AM.  Learner: Patient  Readiness: Acceptance  Method: Explanation, Demonstration  Response: Needs Reinforcement    Home Exercise Program, taught by Daily Gustafson PTA at 4/4/2025  9:28 AM.  Learner: Patient  Readiness: Acceptance  Method: Explanation, Demonstration  Response: Needs Reinforcement    Education Comments  No comments found.        OP EDUCATION:       Encounter Problems       Encounter Problems (Active)       General Goals       Pt will be supervision with supine/sitting LE HEP (Progressing)       Start:  04/02/25    Expected End:  04/16/25            Pt will come supine to/from sit (HOB elevated, use of " rail) modified independent (Progressing)       Start:  04/02/25    Expected End:  04/16/25            Pt will come sit to/from stand and bed to/from chair with WW and supervision, no LOB (Progressing)       Start:  04/02/25    Expected End:  04/16/25               Mobility       Pt will ambulate 150' with WW at gait speed > 0.8 meters/second with supervision, no LOB (Progressing)       Start:  04/02/25    Expected End:  04/16/25            Pt will ascend/descend 4 + 13 stairs with 1 rail, 1 step at a time leading with Left le with supervision, no LOB (Not Progressing)       Start:  04/02/25    Expected End:  04/16/25            Pt will complete the 5 Times Sit to Stand with supervision in >16.7 seconds (Not Progressing)       Start:  04/02/25    Expected End:  04/16/25               Pain - Adult

## 2025-04-06 ENCOUNTER — HOME CARE VISIT (OUTPATIENT)
Dept: HOME HEALTH SERVICES | Facility: HOME HEALTH | Age: 80
End: 2025-04-06
Payer: MEDICARE

## 2025-04-06 NOTE — DISCHARGE SUMMARY
Discharge Diagnosis  Paget's disease of right femur    Issues Requiring Follow-Up  PT/OT at SNF    Discharge Meds     Medication List      START taking these medications     diclofenac sodium 1 % gel; Commonly known as: Voltaren; Apply 4.5 inches   (4 g) topically 4 times a day. Apply to right leg     CHANGE how you take these medications     acetaminophen 500 mg tablet; Commonly known as: Tylenol; Take 2 tablets   (1,000 mg) by mouth every 8 hours for 5 days.; What changed: medication   strength, how much to take, when to take this, reasons to take this     CONTINUE taking these medications     carbidopa-levodopa  mg tablet; Commonly known as: Sinemet; Take 1   tablet by mouth 3 times a day. Take one tablet 3 times daily at 12 PM, 4   PM, 8 PM.   dorzolamide-timoloL 22.3-6.8 mg/mL ophthalmic solution; Commonly known   as: Cosopt   fluticasone 50 mcg/actuation nasal spray; Commonly known as: Flonase;   Administer 1 spray into each nostril once daily. Shake gently. Before   first use, prime pump. After use, clean tip and replace cap.   furosemide 40 mg tablet; Commonly known as: Lasix; Take 1 tablet (40 mg)   by mouth once daily.   latanoprost 0.005 % ophthalmic solution; Commonly known as: Xalatan   multivitamin tablet   sildenafil 100 mg tablet; Commonly known as: Viagra; Take 1 tablet (100   mg) by mouth once daily as needed for erectile dysfunction.   simvastatin 40 mg tablet; Commonly known as: Zocor; Take 1 tablet (40   mg) by mouth once daily at bedtime.       Test Results Pending At Discharge  Pending Labs       No current pending labs.            Hospital Course  79y male with PMHx: BPH, HtN, Parkinsons, HLD, Inguinal hernia, lumbar spondylosis, glaucoma, OA, Paget's disease who presented to the ED today with his son for c/o Dav leg weakness and right leg pain.     While in the ED Patient vitals were stable and afebrile. Labs were also WNL, urine negative for UTI, right hip xray shows no acute  abnormality, Right femur shows the same and recognizes the pagets and OA. Patient to be admitted observation for weakness and gait instability.    Weakness   Gait instability  Pagets  OA  -PT/OT recommend moderate intensity therapy, will benefit from SN  -SW for placement  -Pain: Tylenols scheduled  -voltaren gel/lidocaine to right leg    > 30min in discharge coordination of care which includes: discharge planning, medication reconciliation, arranging appropriate follow up and discussion of discharge instructions with the patient.      Pertinent Physical Exam At Time of Discharge  Physical Exam  Vitals and nursing note reviewed.   Constitutional:       General: He is not in acute distress.     Appearance: Normal appearance.   HENT:      Head: Normocephalic and atraumatic.      Mouth/Throat:      Mouth: Mucous membranes are moist.   Eyes:      Extraocular Movements: Extraocular movements intact.      Conjunctiva/sclera: Conjunctivae normal.   Cardiovascular:      Rate and Rhythm: Regular rhythm.   Pulmonary:      Effort: Pulmonary effort is normal.   Abdominal:      General: Abdomen is flat. Bowel sounds are normal.      Palpations: Abdomen is soft.   Musculoskeletal:         General: Tenderness present.      Right lower leg: Edema present.      Left lower leg: Edema present.   Skin:     General: Skin is warm.   Neurological:      Mental Status: He is alert and oriented to person, place, and time.      Motor: Weakness present.      Gait: Gait abnormal.   Psychiatric:         Mood and Affect: Mood normal.         Behavior: Behavior normal.         Thought Content: Thought content normal.     Outpatient Follow-Up  Future Appointments   Date Time Provider Department Center   4/8/2025 To Be Determined Ramon Bateman [facility]   4/9/2025 To Be Determined Tish Aguirre OT [facility]   4/10/2025 To Be Determined [facility] Bat[facility]   4/15/2025 To Be Determined Ramon Bateman [facility]   4/17/2025  To Be Determined Ramon Bateman, PTA [facility]   4/21/2025 To Be Determined Ramon Bateman, PTA [facility]   4/22/2025 11:00 AM Osvaldo Stover MD [id number] [facility]   4/24/2025 To Be Determined Marie Levi, PT [facility]   1/22/2026  2:30 PM Ramy Castle MD [facility]         Earl Hendrix,

## 2025-04-07 ENCOUNTER — HOME CARE VISIT (OUTPATIENT)
Dept: HOME HEALTH SERVICES | Facility: HOME HEALTH | Age: 80
End: 2025-04-07
Payer: MEDICARE

## 2025-04-07 ENCOUNTER — NURSING HOME VISIT (OUTPATIENT)
Dept: POST ACUTE CARE | Facility: EXTERNAL LOCATION | Age: 80
End: 2025-04-07
Payer: MEDICARE

## 2025-04-07 DIAGNOSIS — R60.0 BILATERAL LOWER EXTREMITY EDEMA: ICD-10-CM

## 2025-04-07 DIAGNOSIS — I10 BENIGN ESSENTIAL HTN: ICD-10-CM

## 2025-04-07 DIAGNOSIS — M19.90 OSTEOARTHRITIS, UNSPECIFIED OSTEOARTHRITIS TYPE, UNSPECIFIED SITE: ICD-10-CM

## 2025-04-07 DIAGNOSIS — M47.816 LUMBAR SPONDYLOSIS: ICD-10-CM

## 2025-04-07 DIAGNOSIS — H40.9 GLAUCOMA, UNSPECIFIED GLAUCOMA TYPE, UNSPECIFIED LATERALITY: ICD-10-CM

## 2025-04-07 DIAGNOSIS — J30.9 ALLERGIC RHINITIS, UNSPECIFIED SEASONALITY, UNSPECIFIED TRIGGER: ICD-10-CM

## 2025-04-07 DIAGNOSIS — E78.5 HYPERLIPIDEMIA, UNSPECIFIED HYPERLIPIDEMIA TYPE: ICD-10-CM

## 2025-04-07 DIAGNOSIS — M62.81 GENERALIZED MUSCLE WEAKNESS: ICD-10-CM

## 2025-04-07 DIAGNOSIS — K59.00 CONSTIPATION, UNSPECIFIED CONSTIPATION TYPE: ICD-10-CM

## 2025-04-07 DIAGNOSIS — M88.851: Primary | ICD-10-CM

## 2025-04-07 DIAGNOSIS — R26.89 IMPAIRED GAIT AND MOBILITY: ICD-10-CM

## 2025-04-07 DIAGNOSIS — G20.B1 PARKINSON'S DISEASE WITH DYSKINESIA, UNSPECIFIED WHETHER MANIFESTATIONS FLUCTUATE: ICD-10-CM

## 2025-04-07 PROCEDURE — 99310 SBSQ NF CARE HIGH MDM 45: CPT | Performed by: NURSE PRACTITIONER

## 2025-04-07 NOTE — LETTER
Patient: Yaniv Bearden  : 1945    Encounter Date: 2025    Name: Yaniv Bearden    YOB: 1945    Code Status: FULL CODE    Chief Complaint:  Initial visit; new patient;  Paget's disease of right femur; OA; etc....    HPI     79 year old male with medical history of BPH, HTN, Parkinsons, HLD, Inguinal hernia, lumbar spondylosis,   glaucoma, OA, venous insufficiency and Paget's disease.    HOSPITAL COURSE:  Patient presented to the [facility] ED on 25 with his son for complaint of bilateral leg weakness and right leg pain.   While in the ED Patient vitals were stable and afebrile.   Per patient's son patient normally ambulates freely around his home and outside without any assistive devices.   Son stated that the few days before presenting to the hospital the patient was declining and his legs were getting weaker   and he was unable to ambulate.   Patient and son denied any recent fall, injuries or accidents.   Also no complaint of any recent acute illnesses.   They would like patient evaluated by PT/OT.   Labs were also WNL, urine negative for UTI, right hip xray showed no acute abnormality,   Right femur shows the same and recognizes the pagets and OA.   Patient was admitted observation for weakness and gait instability.    Patient was hospitalized at [facility] from 25 to 25 (3 days)    Patient was admitted to the [facility] on 25 for skilled services.    Hospital and chronic diagnoses as follows:    Paget's disease or right femur; OA:  Patient evaluated and treated at [facility].  PT/OT evaluated patient and recommend moderate intensity therapy  Social work evaluated the patient for help with SNF placement.  On acetaminophen for pain.   On Voltaren gel and lidocaine to right leg  Patient seen today.  Calm, cooperative.  Follows commands.  Patient is able to ambulate with a walker.  Participating in PT/OT at the [facility].    HTN; bilateral lower extremity edema:  On  Lasix.  Reminded patient to elevate his legs.   Recent /76    HLD:  On simvastatin.    Parkinson's disease:   On carbidopa/ levodopa.  Patient ambulates with a walker and shuffling sat.     Lumbar spondylosis:  Patient at the [facility] for skilled services.  No complaints of back pain today.     Allergic rhinitis:  On Flonase nasal spray.   No complaints of stuffy or runny nose.    Glaucoma:  On dorzolamide-timolol and latanoprost. ophthalmic solution.  No complaints of eye pain or change in vision.     Generalized muscle weakness; Impaired gait and mobility:   At the [facility] skilled services.  Patient had increased weakness and could not ambulate at home.  Ambulating with a walker.   No recent falls reported.     Constipation:  On MOM PRN, bisacodyl supp PRN.  No complaints of constipation today.                        Reviewed  hospital records from recent hospitalization.  Reviewed  EMR  Reviewed medical, social, surgical and family history.  Reviewed all current medications and performed medication reconciliation.  Performed prescription drug management.  Reviewed vital signs AND lab results  Reviewed [facility] Documentation  Discussed patient with nursing and .  Spent greater than 50 minutes on patient visit.             ROS: 10 point ROS performed; Negative unless noted in HPI.    Medical History:   Diagnosis Date   • Acquired spondylolisthesis     • Arthritis     • BPH with obstruction/lower urinary tract symptoms     • Chronic constipation     • Foreskin problem     • Gait instability     • H/O echocardiogram 02/24/2023     CONCLUSIONS: 1. Left ventricular systolic function is normal with a 55-60% estimated ejection fraction. 2. No left ventricular thrombus visualized. 3. RVSP within normal limits. 4. There is plaque visualized in the ascending aorta.   • Hyperlipidemia     • Hypertension     • Overweight     • Paget's bone disease     • Parkinson  "disease (Multi)     • Phimosis     • PVD (peripheral vascular disease) (CMS-[facility])     • Tremor     • Venous insufficiency          Surgical History:   Procedure Laterality Date   • PROSTATE SURGERY   05/13/2022       Family History   Problem Relation Name Age of Onset   • No Known Problems Mother       • No Known Problems Father       • Heart disease Sister           SOCIAL HISTORY:  Never smoked  Never used smokeless tobacco  He does not currently use alcohol after a past usage of about 1.0 standard drink of alcohol per week.   No illicit drug use.              Lab Results   Component Value Date    WBC 5.7 04/04/2025    HGB 12.3 (L) 04/04/2025    HCT 36.7 (L) 04/04/2025     04/04/2025    CHOL 144 03/08/2024    TRIG 82 03/08/2024    HDL 52.9 03/08/2024    ALT 3 (L) 04/01/2025    AST 23 04/01/2025     04/04/2025    K 3.3 (L) 04/04/2025     04/04/2025    CREATININE 0.99 04/04/2025    BUN 14 04/04/2025    CO2 29 04/04/2025    TSH 1.76 03/22/2025    PSA 0.69 02/24/2023    INR 1.1 05/14/2022    HGBA1C 5.1 04/12/2021             /76   Pulse 70   Temp 36.6 °C (97.8 °F)   Resp 18   Ht 1.702 m (5' 7\")   Wt 73.9 kg (163 lb)   SpO2 96%   BMI 25.53 kg/m²      Physical Exam  Vitals and nursing note reviewed.   Constitutional:       Appearance: Normal appearance. He is ill-appearing.   HENT:      Head: Normocephalic.      Right Ear: External ear normal.      Left Ear: External ear normal.      Nose: Nose normal.      Mouth/Throat:      Mouth: Mucous membranes are moist.      Pharynx: Oropharynx is clear.   Eyes:      Extraocular Movements: Extraocular movements intact.      Conjunctiva/sclera: Conjunctivae normal.      Pupils: Pupils are equal, round, and reactive to light.   Cardiovascular:      Rate and Rhythm: Normal rate and regular rhythm.      Pulses: Normal pulses.      Heart sounds: Normal heart sounds.   Pulmonary:      Effort: Pulmonary effort is normal.      Breath sounds: Normal breath " sounds.   Abdominal:      General: Bowel sounds are normal.      Palpations: Abdomen is soft.   Musculoskeletal:         General: Normal range of motion.      Cervical back: Normal range of motion and neck supple.      Right lower le+ Edema present.      Left lower le+ Edema present.   Skin:     General: Skin is warm and dry.   Neurological:      General: No focal deficit present.      Mental Status: He is alert and oriented to person, place, and time. Mental status is at baseline.      Sensory: Sensation is intact.      Motor: Weakness and tremor present.      Coordination: Coordination abnormal.      Gait: Gait abnormal.   Psychiatric:         Mood and Affect: Mood normal. Affect is flat.         Behavior: Behavior normal. Behavior is cooperative.          Assessment/Plan   Ordered CMP and CBC with diff. For tomorrow.    Paget's disease of right femur; OA:  Patient evaluated and treated at [facility].  Continue acetaminophen for pain.   Continue Voltaren gel and lidocaine to right leg  Continue to ambulate with a walker.  Continue PT/OT at the [facility].    HTN; bilateral lower extremity edema:  Continue Lasix.  Elevate bilateral legs.    Monitor Bps.    HLD:  Continue simvastatin.    Parkinson's disease:   Continue carbidopa/ levodopa.  Continue PT/OT.  Fall prevention strategies.    Lumbar spondylosis:  Patient at the [facility] for skilled services, PT/OT.   Monitor for back pain.    Allergic rhinitis:  Continue Flonase nasal spray.   Monitor for  stuffy or runny nose.    Glaucoma:  Continue dorzolamide-timolol and latanoprost ophthalmic solution.  Monitor for eye pain or change in vision.     Generalized muscle weakness; Impaired gait and mobility:   Continue at [facility] for skilled services.  Ambulates with a walker.   Fall prevention strategies.     Constipation:  Continue MOM PRN, bisacodyl supp PRN.  Monitor for constipation.      Problem List Items Addressed This Visit        Parkinson's disease with dyskinesia, unspecified whether manifestations fluctuate    Paget's disease of right femur - Primary    Hyperlipidemia     Other Visit Diagnoses       Osteoarthritis, unspecified osteoarthritis type, unspecified site        Benign essential HTN        Bilateral lower extremity edema        Lumbar spondylosis        Allergic rhinitis, unspecified seasonality, unspecified trigger        Glaucoma, unspecified glaucoma type, unspecified laterality        Generalized muscle weakness        Impaired gait and mobility        Constipation, unspecified constipation type                ISAEL Mays       Electronically Signed By: ISAEL Mays   4/9/25 12:13 AM

## 2025-04-08 ENCOUNTER — HOME CARE VISIT (OUTPATIENT)
Dept: HOME HEALTH SERVICES | Facility: HOME HEALTH | Age: 80
End: 2025-04-08
Payer: MEDICARE

## 2025-04-08 VITALS
HEART RATE: 70 BPM | BODY MASS INDEX: 25.58 KG/M2 | DIASTOLIC BLOOD PRESSURE: 76 MMHG | RESPIRATION RATE: 18 BRPM | HEIGHT: 67 IN | SYSTOLIC BLOOD PRESSURE: 133 MMHG | WEIGHT: 163 LBS | OXYGEN SATURATION: 96 % | TEMPERATURE: 97.8 F

## 2025-04-09 NOTE — PROGRESS NOTES
Name: Yaniv Bearden    YOB: 1945    Code Status: FULL CODE    Chief Complaint:  Initial visit; new patient;  Paget's disease of right femur; OA; etc....    HPI     79 year old male with medical history of BPH, HTN, Parkinsons, HLD, Inguinal hernia, lumbar spondylosis,   glaucoma, OA, venous insufficiency and Paget's disease.    HOSPITAL COURSE:  Patient presented to the [facility] ED on 4/1/25 with his son for complaint of bilateral leg weakness and right leg pain.   While in the ED Patient vitals were stable and afebrile.   Per patient's son patient normally ambulates freely around his home and outside without any assistive devices.   Son stated that the few days before presenting to the hospital the patient was declining and his legs were getting weaker   and he was unable to ambulate.   Patient and son denied any recent fall, injuries or accidents.   Also no complaint of any recent acute illnesses.   They would like patient evaluated by PT/OT.   Labs were also WNL, urine negative for UTI, right hip xray showed no acute abnormality,   Right femur shows the same and recognizes the pagets and OA.   Patient was admitted observation for weakness and gait instability.    Patient was hospitalized at [facility] from 4/1/25 to 4/4/25 (3 days)    Patient was admitted to the [facility] on 4/4/25 for skilled services.    Hospital and chronic diagnoses as follows:    Paget's disease or right femur; OA:  Patient evaluated and treated at [facility].  PT/OT evaluated patient and recommend moderate intensity therapy  Social work evaluated the patient for help with SNF placement.  On acetaminophen for pain.   On Voltaren gel and lidocaine to right leg  Patient seen today.  Calm, cooperative.  Follows commands.  Patient is able to ambulate with a walker.  Participating in PT/OT at the [facility].    HTN; bilateral lower extremity edema:  On Lasix.  Reminded patient to elevate his legs.   Recent BP  133/76    HLD:  On simvastatin.    Parkinson's disease:   On carbidopa/ levodopa.  Patient ambulates with a walker and shuffling sat.     Lumbar spondylosis:  Patient at the [facility] for skilled services.  No complaints of back pain today.     Allergic rhinitis:  On Flonase nasal spray.   No complaints of stuffy or runny nose.    Glaucoma:  On dorzolamide-timolol and latanoprost. ophthalmic solution.  No complaints of eye pain or change in vision.     Generalized muscle weakness; Impaired gait and mobility:   At the [facility] for skilled services.  Patient had increased weakness and could not ambulate at home.  Ambulating with a walker.   No recent falls reported.     Constipation:  On MOM PRN, bisacodyl supp PRN.  No complaints of constipation today.                        Reviewed  hospital records from recent hospitalization.  Reviewed  EMR  Reviewed medical, social, surgical and family history.  Reviewed all current medications and performed medication reconciliation.  Performed prescription drug management.  Reviewed vital signs AND lab results  Reviewed [facility] Documentation  Discussed patient with nursing and .  Spent greater than 50 minutes on patient visit.             ROS: 10 point ROS performed; Negative unless noted in HPI.    Medical History:   Diagnosis Date    Acquired spondylolisthesis      Arthritis      BPH with obstruction/lower urinary tract symptoms      Chronic constipation      Foreskin problem      Gait instability      H/O echocardiogram 02/24/2023     CONCLUSIONS: 1. Left ventricular systolic function is normal with a 55-60% estimated ejection fraction. 2. No left ventricular thrombus visualized. 3. RVSP within normal limits. 4. There is plaque visualized in the ascending aorta.    Hyperlipidemia      Hypertension      Overweight      Paget's bone disease      Parkinson disease (Multi)      Phimosis      PVD (peripheral vascular disease)  "(CMS-[facility])      Tremor      Venous insufficiency          Surgical History:   Procedure Laterality Date    PROSTATE SURGERY   05/13/2022       Family History   Problem Relation Name Age of Onset    No Known Problems Mother        No Known Problems Father        Heart disease Sister           SOCIAL HISTORY:  Never smoked  Never used smokeless tobacco  He does not currently use alcohol after a past usage of about 1.0 standard drink of alcohol per week.   No illicit drug use.              Lab Results   Component Value Date    WBC 5.7 04/04/2025    HGB 12.3 (L) 04/04/2025    HCT 36.7 (L) 04/04/2025     04/04/2025    CHOL 144 03/08/2024    TRIG 82 03/08/2024    HDL 52.9 03/08/2024    ALT 3 (L) 04/01/2025    AST 23 04/01/2025     04/04/2025    K 3.3 (L) 04/04/2025     04/04/2025    CREATININE 0.99 04/04/2025    BUN 14 04/04/2025    CO2 29 04/04/2025    TSH 1.76 03/22/2025    PSA 0.69 02/24/2023    INR 1.1 05/14/2022    HGBA1C 5.1 04/12/2021             /76   Pulse 70   Temp 36.6 °C (97.8 °F)   Resp 18   Ht 1.702 m (5' 7\")   Wt 73.9 kg (163 lb)   SpO2 96%   BMI 25.53 kg/m²      Physical Exam  Vitals and nursing note reviewed.   Constitutional:       Appearance: Normal appearance. He is ill-appearing.   HENT:      Head: Normocephalic.      Right Ear: External ear normal.      Left Ear: External ear normal.      Nose: Nose normal.      Mouth/Throat:      Mouth: Mucous membranes are moist.      Pharynx: Oropharynx is clear.   Eyes:      Extraocular Movements: Extraocular movements intact.      Conjunctiva/sclera: Conjunctivae normal.      Pupils: Pupils are equal, round, and reactive to light.   Cardiovascular:      Rate and Rhythm: Normal rate and regular rhythm.      Pulses: Normal pulses.      Heart sounds: Normal heart sounds.   Pulmonary:      Effort: Pulmonary effort is normal.      Breath sounds: Normal breath sounds.   Abdominal:      General: Bowel sounds are normal.      Palpations: " Abdomen is soft.   Musculoskeletal:         General: Normal range of motion.      Cervical back: Normal range of motion and neck supple.      Right lower le+ Edema present.      Left lower le+ Edema present.   Skin:     General: Skin is warm and dry.   Neurological:      General: No focal deficit present.      Mental Status: He is alert and oriented to person, place, and time. Mental status is at baseline.      Sensory: Sensation is intact.      Motor: Weakness and tremor present.      Coordination: Coordination abnormal.      Gait: Gait abnormal.   Psychiatric:         Mood and Affect: Mood normal. Affect is flat.         Behavior: Behavior normal. Behavior is cooperative.          Assessment/Plan    Ordered CMP and CBC with diff. For tomorrow.    Paget's disease of right femur; OA:  Patient evaluated and treated at [facility].  Continue acetaminophen for pain.   Continue Voltaren gel and lidocaine to right leg  Continue to ambulate with a walker.  Continue PT/OT at the [facility].    HTN; bilateral lower extremity edema:  Continue Lasix.  Elevate bilateral legs.    Monitor Bps.    HLD:  Continue simvastatin.    Parkinson's disease:   Continue carbidopa/ levodopa.  Continue PT/OT.  Fall prevention strategies.    Lumbar spondylosis:  Patient at the [facility] for skilled services, PT/OT.   Monitor for back pain.    Allergic rhinitis:  Continue Flonase nasal spray.   Monitor for  stuffy or runny nose.    Glaucoma:  Continue dorzolamide-timolol and latanoprost ophthalmic solution.  Monitor for eye pain or change in vision.     Generalized muscle weakness; Impaired gait and mobility:   Continue at [facility] for skilled services.  Ambulates with a walker.   Fall prevention strategies.     Constipation:  Continue MOM PRN, bisacodyl supp PRN.  Monitor for constipation.      Problem List Items Addressed This Visit       Parkinson's disease with dyskinesia, unspecified whether manifestations  fluctuate    Paget's disease of right femur - Primary    Hyperlipidemia     Other Visit Diagnoses       Osteoarthritis, unspecified osteoarthritis type, unspecified site        Benign essential HTN        Bilateral lower extremity edema        Lumbar spondylosis        Allergic rhinitis, unspecified seasonality, unspecified trigger        Glaucoma, unspecified glaucoma type, unspecified laterality        Generalized muscle weakness        Impaired gait and mobility        Constipation, unspecified constipation type                Altagracia Jean, APRN-CNP

## 2025-04-22 ENCOUNTER — APPOINTMENT (OUTPATIENT)
Dept: PRIMARY CARE | Facility: CLINIC | Age: 80
End: 2025-04-22
Payer: MEDICARE

## 2025-04-22 VITALS
BODY MASS INDEX: 25.03 KG/M2 | WEIGHT: 159.8 LBS | HEART RATE: 62 BPM | DIASTOLIC BLOOD PRESSURE: 70 MMHG | SYSTOLIC BLOOD PRESSURE: 130 MMHG | RESPIRATION RATE: 16 BRPM | TEMPERATURE: 97.9 F

## 2025-04-22 DIAGNOSIS — R26.81 GAIT INSTABILITY: ICD-10-CM

## 2025-04-22 DIAGNOSIS — R60.0 PEDAL EDEMA: ICD-10-CM

## 2025-04-22 DIAGNOSIS — I10 BENIGN ESSENTIAL HYPERTENSION: ICD-10-CM

## 2025-04-22 DIAGNOSIS — G20.B1 PARKINSON'S DISEASE WITH DYSKINESIA, UNSPECIFIED WHETHER MANIFESTATIONS FLUCTUATE: Primary | ICD-10-CM

## 2025-04-22 DIAGNOSIS — E78.49 OTHER HYPERLIPIDEMIA: ICD-10-CM

## 2025-04-22 PROBLEM — J32.9 CHRONIC SINUSITIS: Status: ACTIVE | Noted: 2025-04-04

## 2025-04-22 PROBLEM — R26.2 DIFFICULTY WALKING: Status: ACTIVE | Noted: 2025-04-04

## 2025-04-22 PROBLEM — H40.1190 PRIMARY OPEN ANGLE GLAUCOMA: Status: ACTIVE | Noted: 2025-04-04

## 2025-04-22 PROBLEM — G24.9 DYSKINESIA: Status: ACTIVE | Noted: 2025-04-04

## 2025-04-22 PROBLEM — M62.81 MUSCLE WEAKNESS: Status: ACTIVE | Noted: 2025-04-04

## 2025-04-22 PROBLEM — E56.9 VITAMIN DEFICIENCY: Status: ACTIVE | Noted: 2025-04-04
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
## 2025-04-22 PROBLEM — M47.816 LUMBAR SPONDYLOSIS: Status: ACTIVE | Noted: 2025-04-04

## 2025-04-22 PROCEDURE — 3075F SYST BP GE 130 - 139MM HG: CPT | Performed by: INTERNAL MEDICINE

## 2025-04-22 PROCEDURE — 99214 OFFICE O/P EST MOD 30 MIN: CPT | Performed by: INTERNAL MEDICINE

## 2025-04-22 PROCEDURE — 3078F DIAST BP <80 MM HG: CPT | Performed by: INTERNAL MEDICINE

## 2025-04-22 PROCEDURE — G2211 COMPLEX E/M VISIT ADD ON: HCPCS | Performed by: INTERNAL MEDICINE

## 2025-04-22 RX ORDER — ATORVASTATIN CALCIUM 20 MG/1
20 TABLET, FILM COATED ORAL DAILY
COMMUNITY
Start: 2025-04-04 | End: 2025-04-22 | Stop reason: ALTCHOICE

## 2025-04-22 RX ORDER — FUROSEMIDE 40 MG/1
40 TABLET ORAL DAILY
Qty: 90 TABLET | Refills: 2 | Status: SHIPPED | OUTPATIENT
Start: 2025-04-22 | End: 2026-01-17

## 2025-04-22 NOTE — PROGRESS NOTES
Yaniv Bearden is a 79 y.o. male   Patient with a with history of hypertension, hyperlipidemia, Lumbar Spondylosis, Parkinson's disease, BPH with LUTS s/p HoLEP, Pedal edema, Padget's disease, Glaucoma, Anemia of chronic disease    04/02   Admitted from ED (Observation) 0014  04/04   Discharged 1111    Admitted for pain and leg weakness  Imaging showed stable pagets  No interventions made  Spent a week in SNF  Now back home  Doing PT at home         Review of Systems     Constitutional: no fever, no chills, not feeling poorly,   ENT: no earache, no hearing loss, no nosebleeds, no nasal discharge, no sore throat and no hoarseness.   Cardiovascular: the heart rate was not slow, the heart rate was not fast, no chest pain, no palpitations, no intermittent leg claudication   Respiratory: no cough, wheezing or shortness of breath at rest or exertion  Gastrointestinal: no abdominal pain, no constipation, no melena, no nausea, no diarrhea, no vomiting and no blood in stools.   Musculoskeletal: hip and back pain  Integumentary: no skin rashes, no skin lesions, no itching, no skin wound and no dry skin.   Neurological: no headache, no confusion, no numbness, no dizziness, no tingling and no fainting.   All other systems have been reviewed and are negative for complaint.     Current Outpatient Medications   Medication Instructions    carbidopa-levodopa (Sinemet)  mg tablet 1 tablet, oral, 3 times daily, Take one tablet 3 times daily at 12 PM, 4 PM, 8 PM.    diclofenac sodium (VOLTAREN) 4 g, Topical, 4 times daily, Apply to right leg    dorzolamide-timoloL (Cosopt) 22.3-6.8 mg/mL ophthalmic solution Dorzolamide HCl-Timolol Mal 22.3-6.8 MG/ML Ophthalmic Solution   Quantity: 10  Refills: 0        Start : 2-Mar-2021   Active    [START ON 4/26/2025] dorzolamide-timoloL (Cosopt) 22.3-6.8 mg/mL ophthalmic solution 1 drop, Both Eyes, 2 times daily, 1 drop each eye 2x daily    fluticasone (Flonase) 50 mcg/actuation nasal spray 1  spray, Each Nostril, Daily, Shake gently. Before first use, prime pump. After use, clean tip and replace cap.    furosemide (LASIX) 40 mg, oral, Daily    latanoprost (Xalatan) 0.005 % ophthalmic solution 1 drop, Both Eyes, Nightly    multivitamin tablet 1 tablet, oral, Daily    sildenafil (VIAGRA) 100 mg, oral, Daily PRN    simvastatin (ZOCOR) 40 mg, oral, Nightly         Vitals:    04/22/25 1039   BP: 130/70   Pulse: 62   Resp: 16   Temp: 36.6 °C (97.9 °F)        Physical Exam     Constitutional   General appearance: Alert and in no acute distress.     Pulmonary   Respiratory assessment: No respiratory distress, normal respiratory rhythm and effort.    Auscultation of Lungs: Clear bilateral breath sounds.   Cardiovascular   Auscultation of heart: Apical pulse normal, heart rate and rhythm normal, normal S1 and S2, no murmurs and no pericardial rub.    Exam for edema: 2 plus edema  Abdomen   Abdominal Exam: No bruits, normal bowel sounds, soft, non-tender, no abdominal mass palpated.    Liver and Spleen exam: No hepato-splenomegaly.   Musculoskeletal   Examination of gait: stiff  Inspection of digits and nails: No clubbing or cyanosis of the fingernails.    Inspection/palpation of joints, bones and muscles: No joint swelling.  Skin   Skin inspection: Normal skin color and pigmentation, normal skin turgor and no visible rash.   Neurologic   Cranial nerves: Nerves 2-12 were intact, dyskinesia    Assessment/Plan          Patient with a with history of hypertension, hyperlipidemia, Lumbar Spondylosis, Parkinson's disease, BPH with LUTS s/p HoLEP, Pedal edema, Padget's disease, Glaucoma, Anemia of chronic disease          # HTN/ Pedal edema  Stable  Continue current medications        # Parkinson's Disease  Getting worse  More stiff/ slow movements  Continue PT  Referral sent to  Dr Ramos     # HLD  Stable  Continue current medications  Watch salt, avoid excessive caffeine  Avoid processed meats/ sugars/juices Instead  eat fresh fruit  Add walnuts and almonds to your diet  exercise 6 days a week for 30 minutes     #Benign prostatic hypertrophy with urine retention  Phimosis s/p circumcision  Being managed by urology           # Lumbar spondylosis/ Hip OA  Take Tylenol 650 Q8 prn

## 2025-09-25 ENCOUNTER — APPOINTMENT (OUTPATIENT)
Dept: NEUROLOGY | Facility: HOSPITAL | Age: 80
End: 2025-09-25
Payer: MEDICARE

## (undated) DEVICE — GAUZE, X-RAY, RF DETECTABLE, 16 PLY, 4 X 4IN, STERILE

## (undated) DEVICE — Device

## (undated) DEVICE — DRESSING, NON-ADHERENT, TELFA, OUCHLESS, 3 X 4 IN, STERILE

## (undated) DEVICE — GLOVE, SURGICAL, PI ULTRATOUCH, BIOGEL, 7.5, PF, LF

## (undated) DEVICE — PREP, SKIN, BETADINE, SCRUB, 16 OZ

## (undated) DEVICE — DRESSING, GAUZE, PETROLATUM, VASELINE, 3 X 36 IN, STERILE

## (undated) DEVICE — SPONGE, GAUZE, RADIOPAQUE, 12 PLY, 4 X 8 IN, STERILE, LF

## (undated) DEVICE — BANDAGE, GAUZE, CONFORMING, KERLIX, 6 PLY, 4.5 IN X 4.1 YD

## (undated) DEVICE — DRAPE, SHEET, ENDOSCOPY, GENERAL, FENESTRATED, ARMBOARD COVER, 98 X 123.5 IN, DISPOSABLE, LF, STERILE

## (undated) DEVICE — SLEEVE, VASO PRESS, CALF GARMENT, MEDIUM, GREEN